# Patient Record
Sex: FEMALE | Race: WHITE | NOT HISPANIC OR LATINO
[De-identification: names, ages, dates, MRNs, and addresses within clinical notes are randomized per-mention and may not be internally consistent; named-entity substitution may affect disease eponyms.]

---

## 2017-09-13 ENCOUNTER — RESULT REVIEW (OUTPATIENT)
Age: 62
End: 2017-09-13

## 2019-01-03 ENCOUNTER — RESULT REVIEW (OUTPATIENT)
Age: 64
End: 2019-01-03

## 2019-02-07 ENCOUNTER — INPATIENT (INPATIENT)
Facility: HOSPITAL | Age: 64
LOS: 3 days | Discharge: HOME | End: 2019-02-11
Attending: HOSPITALIST | Admitting: HOSPITALIST
Payer: COMMERCIAL

## 2019-02-07 VITALS
DIASTOLIC BLOOD PRESSURE: 89 MMHG | OXYGEN SATURATION: 100 % | TEMPERATURE: 98 F | SYSTOLIC BLOOD PRESSURE: 156 MMHG | RESPIRATION RATE: 16 BRPM | HEART RATE: 81 BPM

## 2019-02-07 DIAGNOSIS — Z29.9 ENCOUNTER FOR PROPHYLACTIC MEASURES, UNSPECIFIED: ICD-10-CM

## 2019-02-07 DIAGNOSIS — Z98.89 OTHER SPECIFIED POSTPROCEDURAL STATES: Chronic | ICD-10-CM

## 2019-02-07 DIAGNOSIS — I10 ESSENTIAL (PRIMARY) HYPERTENSION: ICD-10-CM

## 2019-02-07 DIAGNOSIS — D69.6 THROMBOCYTOPENIA, UNSPECIFIED: ICD-10-CM

## 2019-02-07 DIAGNOSIS — E03.9 HYPOTHYROIDISM, UNSPECIFIED: ICD-10-CM

## 2019-02-07 DIAGNOSIS — E11.9 TYPE 2 DIABETES MELLITUS WITHOUT COMPLICATIONS: ICD-10-CM

## 2019-02-07 DIAGNOSIS — E78.5 HYPERLIPIDEMIA, UNSPECIFIED: ICD-10-CM

## 2019-02-07 LAB
ALBUMIN SERPL ELPH-MCNC: 4 G/DL — SIGNIFICANT CHANGE UP (ref 3.3–5)
ALP SERPL-CCNC: 46 U/L — SIGNIFICANT CHANGE UP (ref 40–120)
ALT FLD-CCNC: 14 U/L — SIGNIFICANT CHANGE UP (ref 4–33)
ANION GAP SERPL CALC-SCNC: 12 MMO/L — SIGNIFICANT CHANGE UP (ref 7–14)
APTT BLD: 28.9 SEC — SIGNIFICANT CHANGE UP (ref 27.5–36.3)
AST SERPL-CCNC: 27 U/L — SIGNIFICANT CHANGE UP (ref 4–32)
BASOPHILS # BLD AUTO: 0.06 K/UL — SIGNIFICANT CHANGE UP (ref 0–0.2)
BASOPHILS NFR BLD AUTO: 0.8 % — SIGNIFICANT CHANGE UP (ref 0–2)
BASOPHILS NFR SPEC: 1 % — SIGNIFICANT CHANGE UP (ref 0–2)
BILIRUB SERPL-MCNC: 0.3 MG/DL — SIGNIFICANT CHANGE UP (ref 0.2–1.2)
BLD GP AB SCN SERPL QL: NEGATIVE — SIGNIFICANT CHANGE UP
BUN SERPL-MCNC: 23 MG/DL — SIGNIFICANT CHANGE UP (ref 7–23)
CALCIUM SERPL-MCNC: 9 MG/DL — SIGNIFICANT CHANGE UP (ref 8.4–10.5)
CHLORIDE SERPL-SCNC: 107 MMOL/L — SIGNIFICANT CHANGE UP (ref 98–107)
CO2 SERPL-SCNC: 21 MMOL/L — LOW (ref 22–31)
CREAT SERPL-MCNC: 1.26 MG/DL — SIGNIFICANT CHANGE UP (ref 0.5–1.3)
EOSINOPHIL # BLD AUTO: 0.28 K/UL — SIGNIFICANT CHANGE UP (ref 0–0.5)
EOSINOPHIL NFR BLD AUTO: 3.6 % — SIGNIFICANT CHANGE UP (ref 0–6)
EOSINOPHIL NFR FLD: 0 % — SIGNIFICANT CHANGE UP (ref 0–6)
GLUCOSE SERPL-MCNC: 148 MG/DL — HIGH (ref 70–99)
HAPTOGLOB SERPL-MCNC: 115 MG/DL — SIGNIFICANT CHANGE UP (ref 34–200)
HCT VFR BLD CALC: 39.4 % — SIGNIFICANT CHANGE UP (ref 34.5–45)
HGB BLD-MCNC: 12.6 G/DL — SIGNIFICANT CHANGE UP (ref 11.5–15.5)
HIV COMBO RESULT: SIGNIFICANT CHANGE UP
HIV1+2 AB SPEC QL: SIGNIFICANT CHANGE UP
IMM GRANULOCYTES NFR BLD AUTO: 1.7 % — HIGH (ref 0–1.5)
INR BLD: 1.03 — SIGNIFICANT CHANGE UP (ref 0.88–1.17)
LG PLATELETS BLD QL AUTO: SLIGHT — SIGNIFICANT CHANGE UP
LYMPHOCYTES # BLD AUTO: 1.55 K/UL — SIGNIFICANT CHANGE UP (ref 1–3.3)
LYMPHOCYTES # BLD AUTO: 19.9 % — SIGNIFICANT CHANGE UP (ref 13–44)
LYMPHOCYTES NFR SPEC AUTO: 10 % — LOW (ref 13–44)
MACROCYTES BLD QL: SIGNIFICANT CHANGE UP
MANUAL SMEAR VERIFICATION: SIGNIFICANT CHANGE UP
MCHC RBC-ENTMCNC: 29.8 PG — SIGNIFICANT CHANGE UP (ref 27–34)
MCHC RBC-ENTMCNC: 32 % — SIGNIFICANT CHANGE UP (ref 32–36)
MCV RBC AUTO: 93.1 FL — SIGNIFICANT CHANGE UP (ref 80–100)
MONOCYTES # BLD AUTO: 0.47 K/UL — SIGNIFICANT CHANGE UP (ref 0–0.9)
MONOCYTES NFR BLD AUTO: 6 % — SIGNIFICANT CHANGE UP (ref 2–14)
MONOCYTES NFR BLD: 4 % — SIGNIFICANT CHANGE UP (ref 2–9)
NEUTROPHIL AB SER-ACNC: 85 % — HIGH (ref 43–77)
NEUTROPHILS # BLD AUTO: 5.31 K/UL — SIGNIFICANT CHANGE UP (ref 1.8–7.4)
NEUTROPHILS NFR BLD AUTO: 68 % — SIGNIFICANT CHANGE UP (ref 43–77)
NRBC # BLD: 0 /100WBC — SIGNIFICANT CHANGE UP
NRBC # FLD: 0 K/UL — LOW (ref 25–125)
PLATELET # BLD AUTO: 14 K/UL — CRITICAL LOW (ref 150–400)
PLATELET COUNT - ESTIMATE: SIGNIFICANT CHANGE UP
PMV BLD: 13.5 FL — HIGH (ref 7–13)
POTASSIUM SERPL-MCNC: 4.8 MMOL/L — SIGNIFICANT CHANGE UP (ref 3.5–5.3)
POTASSIUM SERPL-SCNC: 4.8 MMOL/L — SIGNIFICANT CHANGE UP (ref 3.5–5.3)
PROT SERPL-MCNC: 6.8 G/DL — SIGNIFICANT CHANGE UP (ref 6–8.3)
PROTHROM AB SERPL-ACNC: 11.4 SEC — SIGNIFICANT CHANGE UP (ref 9.8–13.1)
RBC # BLD: 4.23 M/UL — SIGNIFICANT CHANGE UP (ref 3.8–5.2)
RBC # FLD: 14 % — SIGNIFICANT CHANGE UP (ref 10.3–14.5)
RETICS #: 106 K/UL — SIGNIFICANT CHANGE UP (ref 25–125)
RETICS/RBC NFR: 2.3 % — SIGNIFICANT CHANGE UP (ref 0.5–2.5)
RH IG SCN BLD-IMP: POSITIVE — SIGNIFICANT CHANGE UP
SODIUM SERPL-SCNC: 140 MMOL/L — SIGNIFICANT CHANGE UP (ref 135–145)
URATE SERPL-MCNC: 6.5 MG/DL — SIGNIFICANT CHANGE UP (ref 2.5–7)
WBC # BLD: 7.8 K/UL — SIGNIFICANT CHANGE UP (ref 3.8–10.5)
WBC # FLD AUTO: 7.8 K/UL — SIGNIFICANT CHANGE UP (ref 3.8–10.5)

## 2019-02-07 PROCEDURE — 99223 1ST HOSP IP/OBS HIGH 75: CPT | Mod: GC

## 2019-02-07 RX ORDER — DEXTROSE 50 % IN WATER 50 %
15 SYRINGE (ML) INTRAVENOUS ONCE
Qty: 0 | Refills: 0 | Status: DISCONTINUED | OUTPATIENT
Start: 2019-02-07 | End: 2019-02-11

## 2019-02-07 RX ORDER — LISINOPRIL 2.5 MG/1
10 TABLET ORAL DAILY
Qty: 0 | Refills: 0 | Status: DISCONTINUED | OUTPATIENT
Start: 2019-02-07 | End: 2019-02-11

## 2019-02-07 RX ORDER — ATORVASTATIN CALCIUM 80 MG/1
80 TABLET, FILM COATED ORAL AT BEDTIME
Qty: 0 | Refills: 0 | Status: DISCONTINUED | OUTPATIENT
Start: 2019-02-07 | End: 2019-02-11

## 2019-02-07 RX ORDER — LEVOTHYROXINE SODIUM 125 MCG
5 TABLET ORAL
Qty: 0 | Refills: 0 | COMMUNITY

## 2019-02-07 RX ORDER — INSULIN LISPRO 100/ML
VIAL (ML) SUBCUTANEOUS
Qty: 0 | Refills: 0 | Status: DISCONTINUED | OUTPATIENT
Start: 2019-02-07 | End: 2019-02-11

## 2019-02-07 RX ORDER — DEXTROSE 50 % IN WATER 50 %
12.5 SYRINGE (ML) INTRAVENOUS ONCE
Qty: 0 | Refills: 0 | Status: DISCONTINUED | OUTPATIENT
Start: 2019-02-07 | End: 2019-02-11

## 2019-02-07 RX ORDER — LEVOTHYROXINE SODIUM 125 MCG
500 TABLET ORAL DAILY
Qty: 0 | Refills: 0 | Status: DISCONTINUED | OUTPATIENT
Start: 2019-02-07 | End: 2019-02-11

## 2019-02-07 RX ORDER — LANOLIN ALCOHOL/MO/W.PET/CERES
3 CREAM (GRAM) TOPICAL AT BEDTIME
Qty: 0 | Refills: 0 | Status: DISCONTINUED | OUTPATIENT
Start: 2019-02-07 | End: 2019-02-11

## 2019-02-07 RX ORDER — INSULIN LISPRO 100/ML
VIAL (ML) SUBCUTANEOUS AT BEDTIME
Qty: 0 | Refills: 0 | Status: DISCONTINUED | OUTPATIENT
Start: 2019-02-07 | End: 2019-02-11

## 2019-02-07 RX ORDER — OXYBUTYNIN CHLORIDE 5 MG
10 TABLET ORAL
Qty: 0 | Refills: 0 | Status: DISCONTINUED | OUTPATIENT
Start: 2019-02-07 | End: 2019-02-11

## 2019-02-07 RX ORDER — DEXTROSE 50 % IN WATER 50 %
25 SYRINGE (ML) INTRAVENOUS ONCE
Qty: 0 | Refills: 0 | Status: DISCONTINUED | OUTPATIENT
Start: 2019-02-07 | End: 2019-02-11

## 2019-02-07 RX ORDER — SODIUM CHLORIDE 9 MG/ML
1000 INJECTION, SOLUTION INTRAVENOUS
Qty: 0 | Refills: 0 | Status: DISCONTINUED | OUTPATIENT
Start: 2019-02-07 | End: 2019-02-11

## 2019-02-07 RX ORDER — CARVEDILOL PHOSPHATE 80 MG/1
6.25 CAPSULE, EXTENDED RELEASE ORAL EVERY 12 HOURS
Qty: 0 | Refills: 0 | Status: DISCONTINUED | OUTPATIENT
Start: 2019-02-07 | End: 2019-02-11

## 2019-02-07 RX ORDER — PREGABALIN 225 MG/1
1000 CAPSULE ORAL DAILY
Qty: 0 | Refills: 0 | Status: DISCONTINUED | OUTPATIENT
Start: 2019-02-07 | End: 2019-02-11

## 2019-02-07 RX ORDER — GLUCAGON INJECTION, SOLUTION 0.5 MG/.1ML
1 INJECTION, SOLUTION SUBCUTANEOUS ONCE
Qty: 0 | Refills: 0 | Status: DISCONTINUED | OUTPATIENT
Start: 2019-02-07 | End: 2019-02-11

## 2019-02-07 RX ORDER — FOLIC ACID 0.8 MG
1 TABLET ORAL DAILY
Qty: 0 | Refills: 0 | Status: DISCONTINUED | OUTPATIENT
Start: 2019-02-07 | End: 2019-02-11

## 2019-02-07 RX ADMIN — Medication 1 MILLIGRAM(S): at 18:38

## 2019-02-07 RX ADMIN — Medication 3 MILLIGRAM(S): at 22:33

## 2019-02-07 RX ADMIN — PREGABALIN 1000 MICROGRAM(S): 225 CAPSULE ORAL at 18:38

## 2019-02-07 RX ADMIN — Medication 10 MILLIGRAM(S): at 18:38

## 2019-02-07 RX ADMIN — CARVEDILOL PHOSPHATE 6.25 MILLIGRAM(S): 80 CAPSULE, EXTENDED RELEASE ORAL at 18:38

## 2019-02-07 RX ADMIN — Medication 100 MILLIGRAM(S): at 18:38

## 2019-02-07 RX ADMIN — ATORVASTATIN CALCIUM 80 MILLIGRAM(S): 80 TABLET, FILM COATED ORAL at 22:33

## 2019-02-07 RX ADMIN — LISINOPRIL 10 MILLIGRAM(S): 2.5 TABLET ORAL at 18:38

## 2019-02-07 NOTE — CONSULT NOTE ADULT - SUBJECTIVE AND OBJECTIVE BOX
HPI:  62 yo F with a history of HTN, well controlled DM2, HLD, nephrolithiasis, hypothyroid, and recurrent breast lumps sent in from her oncologist's (Dr. Soria) office for thrombocytopenia. Per pt, she was getting routine bloodwork prior to starting a new form of Tamoxifen for chemoprevention with Dr. Soria told her to go to ER because her platelets were low. Pt endorses that she bruises easily but that this sx is not new and has been going on for years. She reports normally getting bloodwork about every 3 months and has never been told in the past that she had low platelets. She was recently started on Dydureon a couple of months ago for her DM2 but has not had any other changes to her medications. She has no family history of blood issues. Denies fever, chills, CP, palpitations, abd pain, N/V/D/C, LE swelling.    PAST MEDICAL & SURGICAL HISTORY:  Breast lump: right breast  Gout  GERD (gastroesophageal reflux disease)  Morbid Obesity (ICD9 278.01)  Gall Stones (ICD9 574.20)  H/O: Hypothyroidism (ICD9 V12.2)  Diabetes Mellitus Type 2 in Obese (ICD9 250.00)  Cholesterol Serum Elevated (ICD9 272.9)  H/O: Hypertension (ICD9 V12.59)  H/O cystoscopy: ureteroscopy with lithotripsy 3-4yrs ago  History of cholecystectomy  History of Breast Biopsy (ICD9 V45.89): x3 left breast benign  Heel Spur (ICD9 726.73): bilateral heel spurs  repaired  S/P Carpal Tunnel Release (ICD9 V45.89): left arm  Abscess of Appendix (ICD9 540.1): S/P appendectomy  C Section (ICD9 669.70)  H/O: Hysterectomy (ICD9 V88.01)      Allergies    Cipro (Hives)  coconuts (Hives)  fluoroquinolone antibiotics (Unknown)  raspberries &amp; blueberries (Hives)  Vitamin D (Hives)    Intolerances        MEDICATIONS  (STANDING):  atorvastatin 80 milliGRAM(s) Oral at bedtime  carvedilol 6.25 milliGRAM(s) Oral every 12 hours  cyanocobalamin 1000 MICROGram(s) Oral daily  dextrose 5%. 1000 milliLiter(s) (50 mL/Hr) IV Continuous <Continuous>  dextrose 50% Injectable 12.5 Gram(s) IV Push once  dextrose 50% Injectable 25 Gram(s) IV Push once  dextrose 50% Injectable 25 Gram(s) IV Push once  folic acid 1 milliGRAM(s) Oral daily  insulin lispro (HumaLOG) corrective regimen sliding scale   SubCutaneous three times a day before meals  insulin lispro (HumaLOG) corrective regimen sliding scale   SubCutaneous at bedtime  levothyroxine 500 MICROGram(s) Oral daily  lisinopril 10 milliGRAM(s) Oral daily  oxybutynin 10 milliGRAM(s) Oral two times a day    MEDICATIONS  (PRN):  dextrose 40% Gel 15 Gram(s) Oral once PRN Blood Glucose LESS THAN 70 milliGRAM(s)/deciliter  glucagon  Injectable 1 milliGRAM(s) IntraMuscular once PRN Glucose LESS THAN 70 milligrams/deciliter      FAMILY HISTORY:  Family history of breast cancer (Mother)      SOCIAL HISTORY: No EtOH, no tobacco    REVIEW OF SYSTEMS:    CONSTITUTIONAL: No weakness, fevers or chills  EYES/ENT: No visual changes;  No vertigo or throat pain   NECK: No pain or stiffness  RESPIRATORY: No cough, wheezing, hemoptysis; No shortness of breath  CARDIOVASCULAR: No chest pain or palpitations  GASTROINTESTINAL: No abdominal or epigastric pain. No nausea, vomiting, or hematemesis; No diarrhea or constipation. No melena or hematochezia.  GENITOURINARY: No dysuria, frequency or hematuria  NEUROLOGICAL: No numbness or weakness  SKIN: No itching, burning, rashes, or lesions   All other review of systems is negative unless indicated above.        T(F): 97.7 (02-07-19 @ 09:58), Max: 97.7 (02-07-19 @ 09:58)  HR: 67 (02-07-19 @ 11:23)  BP: 122/64 (02-07-19 @ 11:23)  RR: 25 (02-07-19 @ 11:23)  SpO2: 100% (02-07-19 @ 11:23)  Wt(kg): --    GENERAL: NAD, well-developed  HEAD:  Atraumatic, Normocephalic  EYES: EOMI, PERRLA, conjunctiva and sclera clear  NECK: Supple, No JVD  CHEST/LUNG: Clear to auscultation bilaterally; No wheeze  HEART: Regular rate and rhythm; No murmurs, rubs, or gallops  ABDOMEN: Soft, Nontender, Nondistended; Bowel sounds present  EXTREMITIES:  2+ Peripheral Pulses, No clubbing, cyanosis, or edema  NEUROLOGY: non-focal  SKIN: No rashes or lesions                          12.6   7.80  )-----------( 14       ( 07 Feb 2019 11:17 )             39.4       02-07    140  |  107  |  23  ----------------------------<  148<H>  4.8   |  21<L>  |  1.26    Ca    9.0      07 Feb 2019 11:17    TPro  6.8  /  Alb  4.0  /  TBili  0.3  /  DBili  x   /  AST  27  /  ALT  14  /  AlkPhos  46  02-07      Uric Acid, Serum: 6.5 mg/dL (02-07 @ 14:10)      PT/INR - ( 07 Feb 2019 11:17 )   PT: 11.4 SEC;   INR: 1.03          PTT - ( 07 Feb 2019 11:17 )  PTT:28.9 SEC

## 2019-02-07 NOTE — H&P ADULT - PROBLEM SELECTOR PLAN 1
Pt with isolated thrombocytopenia, seemingly acutely developing given her routine bloodwork outpatient. Concern for ITP given acute presentation, isolated finding and lack of other symptoms. Pt less likely to have drug-induced thrombocytopenia given list of medications are not known for lowering platelets and lack of changes to her medications recently. Also less likely infection-related given lack of other abnormalities seen on CBC and chemistry and lack of other symptoms.  - Hematology consulted, recs appreciated  - Send hep panel, AUBRIE, TSH to r/o as causes of thrombocytopenia. HIV already negative.  - Send B12, folate given h/o deficiency though very low on ddx given rest of CBC is normal  - Per hematology recs, will start treating ITP empirically with prednisone 1mg/kg daily  - Trend platelets daily Pt with isolated thrombocytopenia, seemingly acutely developing given her routine bloodwork outpatient. Concern for ITP given acute presentation, isolated finding and lack of other symptoms. Pt less likely to have drug-induced thrombocytopenia given list of medications are not known for lowering platelets and lack of changes to her medications recently. Also less likely infection-related given lack of other abnormalities seen on CBC and chemistry and lack of other symptoms. TTP and DIC also ruled out given lack of symptoms and normal labwork including coags, hemolysis labs.  - Hematology consulted, recs appreciated  - Send hep panel, AUBRIE, TSH to r/o as causes of thrombocytopenia. HIV already negative.  - Send B12, folate given h/o deficiency though very low on ddx given rest of CBC is normal  - Per hematology recs, will start treating ITP empirically with prednisone 1mg/kg daily  - Trend platelets daily

## 2019-02-07 NOTE — CONSULT NOTE ADULT - ATTENDING COMMENTS
multiple medical comorbidities admitted with plts of 14k, asymptomatic. Likely ITP, r/o secondary causes as above. Start prednisone 1mg/kg. Monitor CBC daily

## 2019-02-07 NOTE — H&P ADULT - NSHPREVIEWOFSYSTEMS_GEN_ALL_CORE
General: Denies dizziness, fatigue  Eyes: Denies blurry vision  ENMT: Denies rhinorrhea  Respiratory: Denies cough, SOB  Cardiovascular: Denies palpitations, CP  Gastrointestinal: Denies abd pain, N/V/D/C, hematochezia, melena  : Denies dysuria, increased freq  Musculoskeletal: Denies edema, joint pain  Endocrine: Denies increased thirst, increased frequency  Allergic/Immunologic: Denies rashes or hives  Neuro: Denies weakness, numbness

## 2019-02-07 NOTE — ED ADULT NURSE NOTE - OBJECTIVE STATEMENT
received pt to rm 12, Sent from Rheumatologist for low platelets. No complaints at this time. h/o 5 benign breast biopsies and scheduled for bone density scan, pt A&Ox4, has no complaints at this time, VSS, IV access rt ac 20g, labs sent, will monitor.

## 2019-02-07 NOTE — H&P ADULT - ASSESSMENT
64 yo F with PMH HTN, DM2 (A1c 6.3, on oral meds), HLD, nephrolithiasis, hypothyroid, recurrent breast lumps sent in from hematologist's office for thrombocytopenia concerning for ITP.

## 2019-02-07 NOTE — ED PROVIDER NOTE - PROGRESS NOTE DETAILS
SAVITA Montaño: Called patient's outpatient laurie Soria, was referred to her cell phone . Dr Soria does not come to [facility], would like her to be seen by house heme. Laurie consulted, Dr. Soria would lean towards admit possibly for steroids and further monitoring. Spoke to laurie who is consulting.

## 2019-02-07 NOTE — H&P ADULT - PROBLEM SELECTOR PLAN 2
Pt well controlled on oral meds with last A1c per patient to be 6.3.  - Hold oral DM2 meds  - FS premeal and bedtime  - ISS premeal and bedtime. Will need to monitor FS closely given initiation of steroids which can cause hyperglycemia  - A1c in AM

## 2019-02-07 NOTE — ED PROVIDER NOTE - CLINICAL SUMMARY MEDICAL DECISION MAKING FREE TEXT BOX
Asymptomatic thrombocytopenia. Non bleeding. Neg neuro findings. PLAN cbc, cmp, coags. If platelet < 15 ct head eval for ich.

## 2019-02-07 NOTE — H&P ADULT - NSHPLABSRESULTS_GEN_ALL_CORE
CBC Full  -  ( 07 Feb 2019 11:17 )  WBC Count : 7.80 K/uL  Hemoglobin : 12.6 g/dL  Hematocrit : 39.4 %  Platelet Count - Automated : 14 K/uL  Mean Cell Volume : 93.1 fL  Mean Cell Hemoglobin : 29.8 pg  Mean Cell Hemoglobin Concentration : 32.0 %  Auto Neutrophil # : 5.31 K/uL  Auto Lymphocyte # : 1.55 K/uL  Auto Monocyte # : 0.47 K/uL  Auto Eosinophil # : 0.28 K/uL  Auto Basophil # : 0.06 K/uL  Auto Neutrophil % : 68.0 %  Auto Lymphocyte % : 19.9 %  Auto Monocyte % : 6.0 %  Auto Eosinophil % : 3.6 %  Auto Basophil % : 0.8 %    02-07    140  |  107  |  23  ----------------------------<  148<H>  4.8   |  21<L>  |  1.26    Ca    9.0      07 Feb 2019 11:17    TPro  6.8  /  Alb  4.0  /  TBili  0.3  /  DBili  x   /  AST  27  /  ALT  14  /  AlkPhos  46  02-07    PT/INR - ( 07 Feb 2019 11:17 )   PT: 11.4 SEC;   INR: 1.03     PTT - ( 07 Feb 2019 11:17 )  PTT:28.9 SEC    HIV Combo Result: NonReact: If patient is suspected to be at risk and/or in the  diagnostic window period, then consider subsequent HIV  retesting with new sample. (02.07.19 @ 14:10)    Reticulocyte Count (02.07.19 @ 14:10)    Reticulocyte Percent: 2.3 %    Absolute Reticulocytes: 106 k/uL    Uric Acid, Serum: 6.5 mg/dL (02.07.19 @ 14:10)    Haptoglobin, Serum: 115 mg/dL (02.07.19 @ 14:10)

## 2019-02-07 NOTE — H&P ADULT - NSHPPHYSICALEXAM_GEN_ALL_CORE
Vital Signs Last 24 Hrs  T(C): 36.5 (07 Feb 2019 09:58), Max: 36.5 (07 Feb 2019 09:58)  T(F): 97.7 (07 Feb 2019 09:58), Max: 97.7 (07 Feb 2019 09:58)  HR: 67 (07 Feb 2019 11:23) (67 - 81)  BP: 122/64 (07 Feb 2019 11:23) (122/64 - 156/89)  BP(mean): --  RR: 25 (07 Feb 2019 11:23) (16 - 25)  SpO2: 100% (07 Feb 2019 11:23) (100% - 100%)    Constitutional: Middle aged, obese woman seen sitting in bed in NAD   Eyes: PERRL, sclera clear  ENMT: MMM, clear oropharynx  Neck: Supple, no cervical LAD  Respiratory: CTAB, no rales, rhonchi or wheezes  Cardiovascular: RRR, no m/g/r  Gastrointestinal: +BS, soft, NT/ND  Neurological: AAOx3  Psychiatric: Appropriate affect and mood  Extremities: No LE edema  Skin: Few echymotic patches, one on the neck, and two on her arms b/l

## 2019-02-07 NOTE — H&P ADULT - ATTENDING COMMENTS
Patient seen and examined. Agree with above note by resident.    #Asymptomatic thrombocytopenia - at this time patient without any complaints. Mild ecchymosis at the site of blood draws but denies any signs of active bleeding. No new medications. No fevers. No schistocytes on diff, coags negative, normal renal function, no recent heparin exposure. At this time suspicion for ITP is highest. Heme evaluation appreciated. Start prednisone for presumed ITP. Monitor cbc.     Plan discussed with patient and HS

## 2019-02-07 NOTE — ED PROVIDER NOTE - OBJECTIVE STATEMENT
10:31 Ashley att; 10:31 Ashley att: 63F h/o htn hld, dm, breast lump 5 benign biopsies sent in from Rheumatologist for thrombocytopenia. Plan for tamoxifen, during routine blood work found to have thrombocytopenia. Denies active bleeding, mental status changes, fevers. Does endorse some easy bruising. 10:31 Ashley att: 63F h/o htn hld, dm, breast lump 5 benign biopsies sent in from Rheumatologist for thrombocytopenia. Plan for tamoxifen, during routine blood work found to have thrombocytopenia. Denies active bleeding, mental status changes, fevers. Does endorse some easy bruising.    SAVITA espinosa: Agree with above, 62 Y/O F with no active symptoms no melena, hematochezia recent fever or weakness had incidentally low platelets on outpatient bloodwork and referred to ED for eval. Pt does not recall level, she does have bruising from her blood draw and from being scratched by her dog. She takes vitamin B 12 and Folate as levels are low. PSH Appendectomy C section Lithotripsy C section heel spurs cateracts ulnar nerve surgery.

## 2019-02-07 NOTE — ED ADULT TRIAGE NOTE - CHIEF COMPLAINT QUOTE
Sent from Rheumatologist for low platelets. No complaints at this time. h/o 5 benign breast biopsies and scheduled for bone density scan

## 2019-02-07 NOTE — H&P ADULT - HISTORY OF PRESENT ILLNESS
62 yo F with PMH HTN, DM2 (A1c 6.3, on oral meds), HLD, nephrolithiasis, recurrent breast lumps sent in from hematologist's office for thrombocytopenia. Per pt, she was getting routine bloodwork prior to starting an antineoplastic medication for her recurrent breast lumps when her oncologist Dr. Soria called her at home to tell her to come in because her platelets were low (she cannot recall the exact number). Pt endorses that she bruises easily but that this sx is not new and has been going on for years. She denies any easy bleeding and visits her outpatient doctors very regularly for follow-up, getting bloodwork about every 3 months. She has never been told she has low platelets before and she has had multiple operations requiring pre-op testing which have not shown any thrombocytopenia in the past, as far as she is aware. She was recently started on Dydureon a couple of months ago for her DM2 but has not had any other changes to her medications. She has no family history of blood issues. Denies fever, chills, CP, palpitations, abd pain, N/V/D/C, LE swelling. 62 yo F with PMH HTN, DM2 (A1c 6.3, on oral meds), HLD, nephrolithiasis, hypothyroid, recurrent breast lumps sent in from hematologist's office for thrombocytopenia. Per pt, she was getting routine bloodwork prior to starting an antineoplastic medication for her recurrent breast lumps when her oncologist Dr. Soria called her at home to tell her to come in because her platelets were low (she cannot recall the exact number). Pt endorses that she bruises easily but that this sx is not new and has been going on for years. She denies any easy bleeding and visits her outpatient doctors very regularly for follow-up, getting bloodwork about every 3 months. She has never been told she has low platelets before and she has had multiple operations requiring pre-op testing which have not shown any thrombocytopenia in the past, as far as she is aware. She was recently started on Dydureon a couple of months ago for her DM2 but has not had any other changes to her medications. She has no family history of blood issues. Denies fever, chills, CP, palpitations, abd pain, N/V/D/C, LE swelling.

## 2019-02-07 NOTE — ED PROVIDER NOTE - MEDICAL DECISION MAKING DETAILS
Pt presented to ED for low platelets. Pt does have easy bruising, no other acute symptoms. Labs ordered, pt stable at this time denies melena or hematochezia.

## 2019-02-07 NOTE — ED PROVIDER NOTE - ATTENDING CONTRIBUTION TO CARE
Dr. Ramirez: I performed a face to face bedside interview with patient regarding history of present illness, review of symptoms and past medical history. I completed an independent physical exam.  I have discussed patient's plan of care with PA.   I agree with note as stated above, having amended the EMR as needed to reflect my findings.   This includes HISTORY OF PRESENT ILLNESS, HIV, PAST MEDICAL/SURGICAL/FAMILY/SOCIAL HISTORY, ALLERGIES AND HOME MEDICATIONS, REVIEW OF SYSTEMS, PHYSICAL EXAM, and any PROGRESS NOTES during the time I functioned as the attending physician for this patient. HPI above as by me. PE above as by me. DDX Asymptomatic thrombocytopenia. Non bleeding. Neg neuro findings. PLAN cbc, cmp, coags. If platelet < 15 ct head eval for ich.

## 2019-02-07 NOTE — CONSULT NOTE ADULT - ASSESSMENT
64 y/o F with multiple medical comorbidities including well controlled T2DM who is here for new finding of severe thrombocytopenia.     #Thrombocytopenia  Will review peripheral smear.   MPV elevated and large platelets seen, consistent with ITP.   Would rule out secondary causes, check hepatitis panel and HIV. Also check AUBRIE.   Would start empiric treatment tonight with prednisone 1 mg/kg daily.   Will require close management of fingersticks in the setting of steroids, well controlled recently.   Monitor CBC daily.  May ultimately require bone marrow evaluation as outpatient.    Will follow closely. 62 y/o F with multiple medical comorbidities including well controlled T2DM who is here for new finding of severe thrombocytopenia.     #Thrombocytopenia  Will review peripheral smear.   MPV elevated and large platelets seen, consistent with ITP.   Would rule out secondary causes, check hepatitis panel and HIV. Also check AUBRIE.   Would start empiric treatment tonight with prednisone 1 mg/kg daily.   Will require close management of fingersticks in the setting of steroids, well controlled recently.   Monitor CBC daily.  May ultimately require bone marrow evaluation as outpatient.    Will follow closely.     Bradley Goldberg M.D. - PGY-6  Hematology/Oncology Fellow  Pager: [phone] ([facility])   [phone] ([facility])  Weekends and after 5PM please contact on call fellow.

## 2019-02-07 NOTE — ED PROVIDER NOTE - PSH
Abscess of Appendix (ICD9 540.1)  S/P appendectomy  C Section (ICD9 669.70)    H/O cystoscopy  ureteroscopy with lithotripsy 3-4yrs ago  H/O: Hysterectomy (ICD9 V88.01)    Heel Spur (ICD9 726.73)  bilateral heel spurs  repaired  History of Breast Biopsy (ICD9 V45.89)  x3 left breast benign  History of cholecystectomy    S/P Carpal Tunnel Release (ICD9 V45.89)  left arm

## 2019-02-07 NOTE — ED PROVIDER NOTE - PHYSICAL EXAMINATION
Ashley att: nad, aaox3, cn2-12 intact, neg intra oral bleeding, ctabl, rrr, s1s2, abd soft ntnd, neg le edema

## 2019-02-07 NOTE — ED PROVIDER NOTE - PMH
Breast lump  right breast  Cholesterol Serum Elevated (ICD9 272.9)    Diabetes Mellitus Type 2 in Obese (ICD9 250.00)    Gall Stones (ICD9 574.20)    GERD (gastroesophageal reflux disease)    Gout    H/O: Hypertension (ICD9 V12.59)    H/O: Hypothyroidism (ICD9 V12.2)    Morbid Obesity (ICD9 278.01)

## 2019-02-08 ENCOUNTER — TRANSCRIPTION ENCOUNTER (OUTPATIENT)
Age: 64
End: 2019-02-08

## 2019-02-08 DIAGNOSIS — D69.3 IMMUNE THROMBOCYTOPENIC PURPURA: ICD-10-CM

## 2019-02-08 LAB
ANION GAP SERPL CALC-SCNC: 13 MMO/L — SIGNIFICANT CHANGE UP (ref 7–14)
ANISOCYTOSIS BLD QL: SLIGHT — SIGNIFICANT CHANGE UP
BASOPHILS # BLD AUTO: 0.02 K/UL — SIGNIFICANT CHANGE UP (ref 0–0.2)
BASOPHILS NFR BLD AUTO: 0.3 % — SIGNIFICANT CHANGE UP (ref 0–2)
BASOPHILS NFR SPEC: 0 % — SIGNIFICANT CHANGE UP (ref 0–2)
BLASTS # FLD: 0 % — SIGNIFICANT CHANGE UP (ref 0–0)
BUN SERPL-MCNC: 24 MG/DL — HIGH (ref 7–23)
CALCIUM SERPL-MCNC: 8.8 MG/DL — SIGNIFICANT CHANGE UP (ref 8.4–10.5)
CHLORIDE SERPL-SCNC: 105 MMOL/L — SIGNIFICANT CHANGE UP (ref 98–107)
CO2 SERPL-SCNC: 21 MMOL/L — LOW (ref 22–31)
CREAT SERPL-MCNC: 1.24 MG/DL — SIGNIFICANT CHANGE UP (ref 0.5–1.3)
EOSINOPHIL # BLD AUTO: 0.01 K/UL — SIGNIFICANT CHANGE UP (ref 0–0.5)
EOSINOPHIL NFR BLD AUTO: 0.1 % — SIGNIFICANT CHANGE UP (ref 0–6)
EOSINOPHIL NFR FLD: 0 % — SIGNIFICANT CHANGE UP (ref 0–6)
FOLATE SERPL-MCNC: 9.6 NG/ML — SIGNIFICANT CHANGE UP (ref 4.7–20)
GIANT PLATELETS BLD QL SMEAR: PRESENT — SIGNIFICANT CHANGE UP
GLUCOSE SERPL-MCNC: 205 MG/DL — HIGH (ref 70–99)
HAV IGM SER-ACNC: NONREACTIVE — SIGNIFICANT CHANGE UP
HBA1C BLD-MCNC: 6 % — HIGH (ref 4–5.6)
HBV CORE IGM SER-ACNC: NONREACTIVE — SIGNIFICANT CHANGE UP
HBV CORE IGM SER-ACNC: NONREACTIVE — SIGNIFICANT CHANGE UP
HBV SURFACE AG SER-ACNC: NEGATIVE — SIGNIFICANT CHANGE UP
HBV SURFACE AG SER-ACNC: NONREACTIVE — SIGNIFICANT CHANGE UP
HCT VFR BLD CALC: 36.2 % — SIGNIFICANT CHANGE UP (ref 34.5–45)
HCT VFR BLD CALC: 37.6 % — SIGNIFICANT CHANGE UP (ref 34.5–45)
HCV AB S/CO SERPL IA: 0.11 S/CO — SIGNIFICANT CHANGE UP
HCV AB S/CO SERPL IA: 0.13 S/CO — SIGNIFICANT CHANGE UP
HCV AB SERPL-IMP: SIGNIFICANT CHANGE UP
HCV AB SERPL-IMP: SIGNIFICANT CHANGE UP
HGB BLD-MCNC: 11.6 G/DL — SIGNIFICANT CHANGE UP (ref 11.5–15.5)
HGB BLD-MCNC: 11.9 G/DL — SIGNIFICANT CHANGE UP (ref 11.5–15.5)
IMM GRANULOCYTES NFR BLD AUTO: 1.7 % — HIGH (ref 0–1.5)
LYMPHOCYTES # BLD AUTO: 0.83 K/UL — LOW (ref 1–3.3)
LYMPHOCYTES # BLD AUTO: 12 % — LOW (ref 13–44)
LYMPHOCYTES NFR SPEC AUTO: 12.3 % — LOW (ref 13–44)
MACROCYTES BLD QL: SLIGHT — SIGNIFICANT CHANGE UP
MAGNESIUM SERPL-MCNC: 1.9 MG/DL — SIGNIFICANT CHANGE UP (ref 1.6–2.6)
MCHC RBC-ENTMCNC: 29.4 PG — SIGNIFICANT CHANGE UP (ref 27–34)
MCHC RBC-ENTMCNC: 29.5 PG — SIGNIFICANT CHANGE UP (ref 27–34)
MCHC RBC-ENTMCNC: 31.6 % — LOW (ref 32–36)
MCHC RBC-ENTMCNC: 32 % — SIGNIFICANT CHANGE UP (ref 32–36)
MCV RBC AUTO: 91.9 FL — SIGNIFICANT CHANGE UP (ref 80–100)
MCV RBC AUTO: 93.3 FL — SIGNIFICANT CHANGE UP (ref 80–100)
METAMYELOCYTES # FLD: 0 % — SIGNIFICANT CHANGE UP (ref 0–1)
MONOCYTES # BLD AUTO: 0.14 K/UL — SIGNIFICANT CHANGE UP (ref 0–0.9)
MONOCYTES NFR BLD AUTO: 2 % — SIGNIFICANT CHANGE UP (ref 2–14)
MONOCYTES NFR BLD: 0 % — LOW (ref 2–9)
MYELOCYTES NFR BLD: 0 % — SIGNIFICANT CHANGE UP (ref 0–0)
NEUTROPHIL AB SER-ACNC: 84.2 % — HIGH (ref 43–77)
NEUTROPHILS # BLD AUTO: 5.82 K/UL — SIGNIFICANT CHANGE UP (ref 1.8–7.4)
NEUTROPHILS NFR BLD AUTO: 83.9 % — HIGH (ref 43–77)
NEUTS BAND # BLD: 1.7 % — SIGNIFICANT CHANGE UP (ref 0–6)
NRBC # FLD: 0 K/UL — LOW (ref 25–125)
NRBC # FLD: 0 K/UL — LOW (ref 25–125)
OTHER - HEMATOLOGY %: 0 — SIGNIFICANT CHANGE UP
PHOSPHATE SERPL-MCNC: 2.9 MG/DL — SIGNIFICANT CHANGE UP (ref 2.5–4.5)
PLATELET # BLD AUTO: 18 K/UL — CRITICAL LOW (ref 150–400)
PLATELET # BLD AUTO: 30 K/UL — LOW (ref 150–400)
PLATELET COUNT - ESTIMATE: SIGNIFICANT CHANGE UP
PMV BLD: 12.5 FL — SIGNIFICANT CHANGE UP (ref 7–13)
PMV BLD: SIGNIFICANT CHANGE UP FL (ref 7–13)
POTASSIUM SERPL-MCNC: 4.2 MMOL/L — SIGNIFICANT CHANGE UP (ref 3.5–5.3)
POTASSIUM SERPL-SCNC: 4.2 MMOL/L — SIGNIFICANT CHANGE UP (ref 3.5–5.3)
PROMYELOCYTES # FLD: 0 % — SIGNIFICANT CHANGE UP (ref 0–0)
RBC # BLD: 3.94 M/UL — SIGNIFICANT CHANGE UP (ref 3.8–5.2)
RBC # BLD: 4.03 M/UL — SIGNIFICANT CHANGE UP (ref 3.8–5.2)
RBC # FLD: 13.9 % — SIGNIFICANT CHANGE UP (ref 10.3–14.5)
RBC # FLD: 13.9 % — SIGNIFICANT CHANGE UP (ref 10.3–14.5)
SODIUM SERPL-SCNC: 139 MMOL/L — SIGNIFICANT CHANGE UP (ref 135–145)
TSH SERPL-MCNC: 17.88 UIU/ML — HIGH (ref 0.27–4.2)
VARIANT LYMPHS # BLD: 1.8 % — SIGNIFICANT CHANGE UP
VIT B12 SERPL-MCNC: 764 PG/ML — SIGNIFICANT CHANGE UP (ref 200–900)
WBC # BLD: 11.09 K/UL — HIGH (ref 3.8–10.5)
WBC # BLD: 6.94 K/UL — SIGNIFICANT CHANGE UP (ref 3.8–10.5)
WBC # FLD AUTO: 11.09 K/UL — HIGH (ref 3.8–10.5)
WBC # FLD AUTO: 6.94 K/UL — SIGNIFICANT CHANGE UP (ref 3.8–10.5)

## 2019-02-08 PROCEDURE — 99233 SBSQ HOSP IP/OBS HIGH 50: CPT | Mod: GC

## 2019-02-08 RX ADMIN — Medication 1: at 18:16

## 2019-02-08 RX ADMIN — Medication 10 MILLIGRAM(S): at 05:21

## 2019-02-08 RX ADMIN — Medication 3 MILLIGRAM(S): at 22:22

## 2019-02-08 RX ADMIN — Medication 1: at 09:26

## 2019-02-08 RX ADMIN — Medication 10 MILLIGRAM(S): at 18:51

## 2019-02-08 RX ADMIN — Medication 1 MILLIGRAM(S): at 12:15

## 2019-02-08 RX ADMIN — Medication 1: at 13:19

## 2019-02-08 RX ADMIN — Medication 100 MILLIGRAM(S): at 18:46

## 2019-02-08 RX ADMIN — ATORVASTATIN CALCIUM 80 MILLIGRAM(S): 80 TABLET, FILM COATED ORAL at 22:23

## 2019-02-08 RX ADMIN — Medication 500 MICROGRAM(S): at 05:23

## 2019-02-08 RX ADMIN — PREGABALIN 1000 MICROGRAM(S): 225 CAPSULE ORAL at 12:19

## 2019-02-08 NOTE — DISCHARGE NOTE ADULT - CARE PROVIDER_API CALL
Yoel Soria)  Hematology; Internal Medicine; Medical Oncology  [address] [place] [address]  Phone: [phone]  Fax: [phone]  Follow Up Time:

## 2019-02-08 NOTE — DISCHARGE NOTE ADULT - MEDICATION SUMMARY - MEDICATIONS TO CHANGE
I will SWITCH the dose or number of times a day I take the medications listed below when I get home from the hospital:    glipizide-metformin 5 mg-500 mg oral tablet  -- 1 tab(s) by mouth once a day

## 2019-02-08 NOTE — DISCHARGE NOTE ADULT - CARE PLAN
Principal Discharge DX:	Immune thrombocytopenia  Goal:	Please call ***  Assessment and plan of treatment:	Thrombocytopenia means low platelet counts. This can happen for many reasons, but we believe  yours is autoimmune, meaning that your body occasional causes your platelet numbers to go down. This can be improved with steroids (PREDNISONE) and time. Please follow up with our Hematologist to ensure your platelet numbers are recovering well.  Secondary Diagnosis:	Diabetes mellitus  Goal:	Please call your primary care provider and request an appointment WITHIN 1 WEEK.  Assessment and plan of treatment:	Because of your steroids for your thrombocytopenia, your glucose will likely be high until you finish this medication. Please let your primary care provider known as other diabetes medications may have to be added if your glucose becomes too high. Principal Discharge DX:	Immune thrombocytopenia  Goal:	Please call your hematologist and request a follow-up appointment WITHIN 1 WEEK.  Assessment and plan of treatment:	Thrombocytopenia means low platelet counts. This can happen for many reasons, but we believe  yours is autoimmune, meaning that your body occasional causes your platelet numbers to go down. This can be improved with steroids (PREDNISONE) and time. Please continue your PREDNISONE for 3 WEEKS. Please follow up with your Hematologist to ensure your platelet numbers are recovering well.  Secondary Diagnosis:	Diabetes mellitus  Goal:	Please call your primary care provider and request an appointment WITHIN 1 WEEK.  Assessment and plan of treatment:	Because of your steroids for your thrombocytopenia, your glucose will likely be high until you finish this medication. Please let your primary care provider known as other diabetes medications may have to be added if your glucose becomes too high. Principal Discharge DX:	Immune thrombocytopenia  Goal:	Please call your hematologist and request a follow-up appointment WITHIN 1 WEEK.  Assessment and plan of treatment:	Thrombocytopenia means low platelet counts. This can happen for many reasons, but we believe  yours is autoimmune, meaning that your body occasional causes your platelet numbers to go down. This can be improved with steroids (PREDNISONE) and time. Please continue your PREDNISONE for 3 WEEKS. Please follow up with your Hematologist to ensure your platelet numbers are recovering well.  Secondary Diagnosis:	Diabetes mellitus  Goal:	Please call your primary care provider and request an appointment WITHIN 1 WEEK. Please update your ENDOCRINOLOGIST as well and ask for a follow-up appointment WITHIN 2-4 weeks.  Assessment and plan of treatment:	Because of your steroids for your thrombocytopenia, your glucose will likely be high until you finish this medication. Please let your primary care provider known as other diabetes medications may have to be added if your glucose becomes too high. Principal Discharge DX:	Immune thrombocytopenia  Goal:	Please call your HEMATOLOGIST and request a follow-up appointment WITHIN 1 WEEK. Continue to take your PREDNISONE until you see your hematologist.  Assessment and plan of treatment:	Thrombocytopenia means low platelet counts. This can happen for many reasons, but we believe  yours is autoimmune, meaning that your body occasionally causes your platelet numbers to go down. Please continue your PREDNISONE for the next week until you are able to see your hematologist. When making an appointment, please let them know this is a hospital follow up and that it is important that you see a hematologist WITHIN 1 WEEK.  Secondary Diagnosis:	Diabetes mellitus  Goal:	Please call your primary care provider and request an appointment WITHIN 1 WEEK. Please update your ENDOCRINOLOGIST as well and ask for a follow-up appointment WITHIN 2-4 weeks.  Assessment and plan of treatment:	Because of your steroids for your thrombocytopenia, your glucose will likely be high until you finish this medication. Please let your primary care provider known as other diabetes medications may have to be added if your glucose becomes too high. Principal Discharge DX:	Immune thrombocytopenia  Goal:	Please call your HEMATOLOGIST and request a follow-up appointment WITHIN 1 WEEK. Continue to take your PREDNISONE until you see your hematologist.  Assessment and plan of treatment:	Thrombocytopenia means low platelet counts. This can happen for many reasons, but we believe  yours is autoimmune, meaning that your body occasionally causes your platelet numbers to go down. Please continue your PREDNISONE for the next week until you are able to see your hematologist. When making an appointment, please let them know this is a hospital follow up and that it is important that you see a hematologist WITHIN 1 WEEK.  Secondary Diagnosis:	Diabetes mellitus  Goal:	Please call your primary care provider and request an appointment WITHIN 1 WEEK. Please update your ENDOCRINOLOGIST as well and ask for a follow-up appointment WITHIN 2-4 weeks.  Assessment and plan of treatment:	Because of your steroids for your thrombocytopenia, your glucose will likely be high until you finish this medication. Please let your primary care provider known as other diabetes medications may have to be added if your glucose becomes too high. For now, increase  your glipizide-metformin to TWICE A DAY.

## 2019-02-08 NOTE — PROGRESS NOTE ADULT - SUBJECTIVE AND OBJECTIVE BOX
Eliza Block MD PGY1  [id number]    Patient is a 63y old  Female who presents with a chief complaint of Thrombocytopenia (07 Feb 2019 17:29)    SUBJECTIVE/OVERNIGHT EVENTS     Vital Signs Last 24 Hrs  T(C): 36.4 (08 Feb 2019 05:19), Max: 36.9 (07 Feb 2019 21:47)  T(F): 97.5 (08 Feb 2019 05:19), Max: 98.4 (07 Feb 2019 21:47)  HR: 61 (08 Feb 2019 05:19) (61 - 81)  BP: 106/56 (08 Feb 2019 05:19) (106/56 - 156/89)  BP(mean): --  RR: 18 (08 Feb 2019 05:19) (16 - 25)  SpO2: 99% (08 Feb 2019 05:19) (98% - 100%)    PHYSICAL EXAM:  GENERAL: NAD, well-developed  HEAD:  Atraumatic, Normocephalic  EYES: EOMI, PERRLA, conjunctiva and sclera clear  NECK: Supple, No JVD  CHEST/LUNG: Clear to auscultation bilaterally; No wheeze  HEART: Regular rate and rhythm; No murmurs, rubs, or gallops  ABDOMEN: Soft, Nontender, Nondistended; Bowel sounds present  EXTREMITIES:  2+ Peripheral Pulses, No clubbing, cyanosis, or edema  PSYCH: AAOx3  NEUROLOGY: non-focal  SKIN: No rashes or lesions Eliza Block MD PGY1  [phone]    Patient is a 63y old  Female who presents with a chief complaint of Thrombocytopenia (07 Feb 2019 17:29)    SUBJECTIVE/OVERNIGHT EVENTS   No acute events O/N.    This AM doing well. States was going to outpt onc for f/up of breast lump bx and incidentally found to have plts in 10s. Denies any mucosal bleeding, joint swelling, or out of proportion ecchymosis.  has regular CBCs drawn Q3mo per PCP and did not have abnormalities on last draw. Has not be exposed to any antineoplastic drugs > 8yrs (last tamoxifene).     Vital Signs Last 24 Hrs  T(C): 36.4 (08 Feb 2019 05:19), Max: 36.9 (07 Feb 2019 21:47)  T(F): 97.5 (08 Feb 2019 05:19), Max: 98.4 (07 Feb 2019 21:47)  HR: 61 (08 Feb 2019 05:19) (61 - 81)  BP: 106/56 (08 Feb 2019 05:19) (106/56 - 156/89)  BP(mean): --  RR: 18 (08 Feb 2019 05:19) (16 - 25)  SpO2: 99% (08 Feb 2019 05:19) (98% - 100%)    PHYSICAL EXAM:  GENERAL: NAD, well-developed  HEAD:  Atraumatic, Normocephalic             3cm x 4cm ecchomyosis overlying small, hard mildly tender nodule.  EYES: EOMI, PERRLA, conjunctiva and sclera clear  NECK: Supple, No JVD  CHEST/LUNG: Clear to auscultation bilaterally; No wheeze  HEART: Regular rate and rhythm; No murmurs, rubs, or gallops  ABDOMEN: Soft, Nontender, Nondistended; Bowel sounds present  EXTREMITIES:  2+ Peripheral Pulses, No clubbing, cyanosis, or edema. Scatter small echymosis on hands and arms.  PSYCH: AAOx3  NEUROLOGY: non-focal  SKIN: No rashes or lesions        MEDICATIONS  (STANDING):  atorvastatin 80 milliGRAM(s) Oral at bedtime  carvedilol 6.25 milliGRAM(s) Oral every 12 hours  cyanocobalamin 1000 MICROGram(s) Oral daily  dextrose 5%. 1000 milliLiter(s) (50 mL/Hr) IV Continuous <Continuous>  dextrose 50% Injectable 12.5 Gram(s) IV Push once  dextrose 50% Injectable 25 Gram(s) IV Push once  dextrose 50% Injectable 25 Gram(s) IV Push once  folic acid 1 milliGRAM(s) Oral daily  insulin lispro (HumaLOG) corrective regimen sliding scale   SubCutaneous three times a day before meals  insulin lispro (HumaLOG) corrective regimen sliding scale   SubCutaneous at bedtime  levothyroxine 500 MICROGram(s) Oral daily  lisinopril 10 milliGRAM(s) Oral daily  melatonin 3 milliGRAM(s) Oral at bedtime  oxybutynin 10 milliGRAM(s) Oral two times a day  predniSONE   Tablet 100 milliGRAM(s) Oral daily    MEDICATIONS  (PRN):  dextrose 40% Gel 15 Gram(s) Oral once PRN Blood Glucose LESS THAN 70 milliGRAM(s)/deciliter  glucagon  Injectable 1 milliGRAM(s) IntraMuscular once PRN Glucose LESS THAN 70 milligrams/deciliter    CAPILLARY BLOOD GLUCOSE  191 (07 Feb 2019 22:34)  123 (07 Feb 2019 18:11)      POCT Blood Glucose.: 174 mg/dL (08 Feb 2019 08:57)  POCT Blood Glucose.: 191 mg/dL (07 Feb 2019 22:17)  POCT Blood Glucose.: 127 mg/dL (07 Feb 2019 17:56)    I&O's Summary      LABS                        11.6   6.94  )-----------( 18       ( 08 Feb 2019 06:26 )             36.2                         12.6   7.80  )-----------( 14       ( 07 Feb 2019 11:17 )             39.4     02-08    139  |  105  |  24<H>  ----------------------------<  205<H>  4.2   |  21<L>  |  1.24  02-07    140  |  107  |  23  ----------------------------<  148<H>  4.8   |  21<L>  |  1.26    Ca    8.8      08 Feb 2019 06:26  Ca    9.0      07 Feb 2019 11:17  Phos  2.9     02-08  Mg     1.9     02-08    TPro  6.8  /  Alb  4.0  /  TBili  0.3  /  DBili  x   /  AST  27  /  ALT  14  /  AlkPhos  46  02-07    PT/INR - ( 07 Feb 2019 11:17 )   PT: 11.4 SEC;   INR: 1.03          PTT - ( 07 Feb 2019 11:17 )  PTT:28.9 SEC      Thyroid Stimulating Hormone, Serum (02.08.19 @ 06:26)    Thyroid Stimulating Hormone, Serum: 17.88 uIU/mL

## 2019-02-08 NOTE — PROGRESS NOTE ADULT - PROBLEM SELECTOR PLAN 2
-on home dose levothyroxine 500mcg QD  -TSH remains highly elevated in 17s  -will clarify and provide education on properly taking medications  -denies any leg swelling, cold intolerance, excessive fatigue, constipation

## 2019-02-08 NOTE — DISCHARGE NOTE ADULT - PLAN OF CARE
Please call *** Thrombocytopenia means low platelet counts. This can happen for many reasons, but we believe  yours is autoimmune, meaning that your body occasional causes your platelet numbers to go down. This can be improved with steroids (PREDNISONE) and time. Please follow up with our Hematologist to ensure your platelet numbers are recovering well. Please call your primary care provider and request an appointment WITHIN 1 WEEK. Because of your steroids for your thrombocytopenia, your glucose will likely be high until you finish this medication. Please let your primary care provider known as other diabetes medications may have to be added if your glucose becomes too high. Please call your hematologist and request a follow-up appointment WITHIN 1 WEEK. Thrombocytopenia means low platelet counts. This can happen for many reasons, but we believe  yours is autoimmune, meaning that your body occasional causes your platelet numbers to go down. This can be improved with steroids (PREDNISONE) and time. Please continue your PREDNISONE for 3 WEEKS. Please follow up with your Hematologist to ensure your platelet numbers are recovering well. Please call your primary care provider and request an appointment WITHIN 1 WEEK. Please update your ENDOCRINOLOGIST as well and ask for a follow-up appointment WITHIN 2-4 weeks. Please call your HEMATOLOGIST and request a follow-up appointment WITHIN 1 WEEK. Continue to take your PREDNISONE until you see your hematologist. Thrombocytopenia means low platelet counts. This can happen for many reasons, but we believe  yours is autoimmune, meaning that your body occasionally causes your platelet numbers to go down. Please continue your PREDNISONE for the next week until you are able to see your hematologist. When making an appointment, please let them know this is a hospital follow up and that it is important that you see a hematologist WITHIN 1 WEEK. Because of your steroids for your thrombocytopenia, your glucose will likely be high until you finish this medication. Please let your primary care provider known as other diabetes medications may have to be added if your glucose becomes too high. For now, increase  your glipizide-metformin to TWICE A DAY.

## 2019-02-08 NOTE — PROGRESS NOTE ADULT - PROBLEM SELECTOR PLAN 1
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 HIV, HepB sAg negative.  -no evidence of active bleeding  -txf goal plts > 10.

## 2019-02-08 NOTE — DISCHARGE NOTE ADULT - MEDICATION SUMMARY - MEDICATIONS TO TAKE
I will START or STAY ON the medications listed below when I get home from the hospital:    predniSONE 50 mg oral tablet  -- 2 tab(s) by mouth once a day.  -- Indication: For Immune thrombocytopenia    aspirin 81 mg oral tablet  -- 1 tab(s) by mouth once a day  -- Indication: For Prophylactic measure    quinapril 10 mg oral tablet  -- 1 tab(s) by mouth once a day  -- Indication: For HTN    Bydureon Pen 2 mg subcutaneous injection, extended release  -- once a week  -- Indication: For Diabetes mellitus    glipizide-metformin 5 mg-500 mg oral tablet  -- 1 tab(s) by mouth 2 times a day   -- Do not drink alcoholic beverages when taking this medication.  It is very important that you take or use this exactly as directed.  Do not skip doses or discontinue unless directed by your doctor.  Take with food or milk.    -- Indication: For Diabetes mellitus    fenofibrate 134 mg oral capsule  -- 1 cap(s) by mouth once a day  -- Indication: For HLD    rosuvastatin 20 mg oral tablet  -- 1 tab(s) by mouth once a day (at bedtime)  -- Indication: For HLD    Zetia 10 mg oral tablet  -- 1 tab(s) by mouth once a day  -- Indication: For HLD    carvedilol 6.25 mg oral tablet  -- 1 tab(s) by mouth once a day (patient states she takes only once a day. Rx states should be bid)  -- Indication: For HTN    Melatonin 5 mg oral tablet  -- 1 tab(s) by mouth once a day (at bedtime)  -- Indication: For Sleep    Tirosint 100 mcg (0.1 mg) oral capsule  -- 5 cap(s) by mouth once a day (patient states she takes 5 capsules of the 100mcg qd. Rx from 11/18 was for 90 days of 4 capsules qd)  -- Indication: For Hypothyroid    VESIcare 10 mg oral tablet  -- 1 tab(s) by mouth once a day  -- Indication: For Urinary spasm    Vitamin B12  -- Indication: For Prophylactic measure    folic acid 1 mg oral tablet  -- 1 tab(s) by mouth once a day  -- Indication: For Prophylactic measure

## 2019-02-08 NOTE — DISCHARGE NOTE ADULT - CONDITIONS AT DISCHARGE
Pt platelet counts improved, no s/s of acute distress noted, pt denies any pain or discomfort, pt skin intact.

## 2019-02-08 NOTE — DISCHARGE NOTE ADULT - HOSPITAL COURSE
64yo F generally healthy w/ PMHx breast masses (non-malignant), hypothyroidism, well controlled T2DM, and HTN admitted for new thrombocytopenia w/ plts in 10s. Pt w/out any sx active bleeding or other systemic sx. Found to have elevated MCV and large plts, consistent w/ immune TCP. Hematology consulted, advised initation of 1mg/kg prednisone (100mg QD). Pt demonstrated improvement of plts on steroids, and prior to d/c, plts stably at ****. Pt given strict return precautions and told to f/up w/ PCP prior to d/c. 64yo F generally healthy w/ PMHx breast masses (non-malignant), hypothyroidism, well controlled T2DM, and HTN admitted for new thrombocytopenia w/ plts in 10s. Pt w/out any sx active bleeding or other systemic sx. Found to have elevated MCV and large plts, consistent w/ immune TCP. Hematology consulted, advised initation of 1mg/kg prednisone (100mg QD). Pt demonstrated improvement of plts on steroids, and prior to d/c, plts continuing to uptrend and >50. Pt given strict return precautions and told to f/up w/ PCP prior to d/c. Course c/b steroid induced hyperglycemia. Pt w/ T2DM well controlled on orals and bydureon. After discussion, pt agreed to start on prandin and to have close f/up w/ PCP and endocrinologist for further adjustment of medications while on steroids. 64yo F generally healthy w/ PMHx breast masses (non-malignant), hypothyroidism, well controlled T2DM, and HTN admitted for new thrombocytopenia w/ plts in 10s. Pt w/out any sx active bleeding or other systemic sx. Found to have elevated MCV and large plts, consistent w/ immune TCP. Hematology consulted, advised initation of 1mg/kg prednisone (100mg QD). Pt demonstrated improvement of plts on steroids, and prior to d/c, plts continuing to uptrend and >50. Per Hematology, pt medically stable for d/c and to continue on 100mg QD prednisone x 1 week until able to f/up w/ outpt hematologist for initiation of taper. Pt given strict return precautions and told to f/up w/ PCP prior to d/c. Course c/b steroid induced hyperglycemia. Pt w/ T2DM well controlled on orals and bydureon. After discussion, pt agreed to start on prandin and to have close f/up w/ PCP and endocrinologist for further adjustment of medications while on steroids. 62yo F generally healthy w/ PMHx breast masses (non-malignant), hypothyroidism, well controlled T2DM, and HTN admitted for new thrombocytopenia w/ plts in 10s. Pt w/out any sx active bleeding or other systemic sx. Found to have elevated MCV and large plts, consistent w/ immune TCP. Hematology consulted, advised initation of 1mg/kg prednisone (100mg QD). Pt demonstrated improvement of plts on steroids, and prior to d/c, plts continuing to uptrend and >50. Per Hematology, pt medically stable for d/c and to continue on 100mg QD prednisone x 1 week until able to f/up w/ outpt hematologist for initiation of taper. Pt given strict return precautions and told to f/up w/ PCP prior to d/c. Course c/b steroid induced hyperglycemia. Pt w/ T2DM well controlled on orals and bydureon. After discussion, decided to increase home glipizide-metformin to BID (increase from 5/500 to 5/500 BID) until f/up w/ PCP and endocrinologist for further adjustment of medications while on steroids.

## 2019-02-08 NOTE — PROGRESS NOTE ADULT - SUBJECTIVE AND OBJECTIVE BOX
INTERVAL HPI/OVERNIGHT EVENTS:  Patient S&E at bedside. No o/n events, patient asymptomatic feeling well.     VITAL SIGNS:  T(F): 98.3 (02-08-19 @ 13:20)  HR: 64 (02-08-19 @ 13:20)  BP: 131/61 (02-08-19 @ 13:20)  RR: 18 (02-08-19 @ 13:20)  SpO2: 100% (02-08-19 @ 13:20)  Wt(kg): --    PHYSICAL EXAM:    Constitutional: NAD  Eyes: EOMI, sclera non-icteric  Neck: supple, no masses, no JVD  Respiratory: CTA b/l, good air entry b/l  Cardiovascular: RRR, no M/R/G  Gastrointestinal: soft, NTND, no masses palpable, + BS, no hepatosplenomegaly  Extremities: no c/c/e  Neurological: AAOx3      MEDICATIONS  (STANDING):  atorvastatin 80 milliGRAM(s) Oral at bedtime  carvedilol 6.25 milliGRAM(s) Oral every 12 hours  cyanocobalamin 1000 MICROGram(s) Oral daily  dextrose 5%. 1000 milliLiter(s) (50 mL/Hr) IV Continuous <Continuous>  dextrose 50% Injectable 12.5 Gram(s) IV Push once  dextrose 50% Injectable 25 Gram(s) IV Push once  dextrose 50% Injectable 25 Gram(s) IV Push once  folic acid 1 milliGRAM(s) Oral daily  insulin lispro (HumaLOG) corrective regimen sliding scale   SubCutaneous three times a day before meals  insulin lispro (HumaLOG) corrective regimen sliding scale   SubCutaneous at bedtime  levothyroxine 500 MICROGram(s) Oral daily  lisinopril 10 milliGRAM(s) Oral daily  melatonin 3 milliGRAM(s) Oral at bedtime  oxybutynin 10 milliGRAM(s) Oral two times a day  predniSONE   Tablet 100 milliGRAM(s) Oral daily    MEDICATIONS  (PRN):  dextrose 40% Gel 15 Gram(s) Oral once PRN Blood Glucose LESS THAN 70 milliGRAM(s)/deciliter  glucagon  Injectable 1 milliGRAM(s) IntraMuscular once PRN Glucose LESS THAN 70 milligrams/deciliter      Allergies    Cipro (Hives)  coconuts (Hives)  fluoroquinolone antibiotics (Unknown)  raspberries &amp; blueberries (Hives)  Vitamin D (Hives)    Intolerances        LABS:                        11.6   6.94  )-----------( 18       ( 08 Feb 2019 06:26 )             36.2     02-08    139  |  105  |  24<H>  ----------------------------<  205<H>  4.2   |  21<L>  |  1.24    Ca    8.8      08 Feb 2019 06:26  Phos  2.9     02-08  Mg     1.9     02-08    TPro  6.8  /  Alb  4.0  /  TBili  0.3  /  DBili  x   /  AST  27  /  ALT  14  /  AlkPhos  46  02-07    PT/INR - ( 07 Feb 2019 11:17 )   PT: 11.4 SEC;   INR: 1.03          PTT - ( 07 Feb 2019 11:17 )  PTT:28.9 SEC      Hepatitis C Antibody Test (02.08.19 @ 06:26)    Hepatitis C Virus S/CO Ratio: 0.11 S/CO    Hepatitis C Virus Interpretation: Nonreactive Hepatitis C AB  S/CO Ratio                        Interpretation  < 1.0                                     Non-Reactive  1.0 - 4.9                           Weakly-Reactive  > 5.0                                 Reactive  Non-Reactive: [place] with a non-reactive HCV antibody  result is considered uninfected.  No further action is  needed unless recent infection is suspected.  In these  cases, consider repeat testing later to detect  seroconversion..  Weakly-Reactive: HCV antibody test is abnormal, HCV RNA  Qualitative test will follow.  Reactive: HCV antibody test is abnormal, HCV RNA  Qualitative test will follow.  Note: HCV antibody testing is performed on the Abbott   system.    Hepatitis B Surface Antigen (02.08.19 @ 06:26)    Hepatitis B Surface Antigen: NEGATIVE    Hepatitis B Core IgM Antibody (02.08.19 @ 06:26)    Hepatitis B Core IgM Antibody: Nonreactive    Acute Hepatitis Panel (02.07.19 @ 14:10)    Hepatitis C Virus Interpretation: Nonreactive Hepatitis C AB  S/CO Ratio                        Interpretation  < 1.0                                     Non-Reactive  1.0 - 4.9                           Weakly-Reactive  > 5.0                                 Reactive  Non-Reactive: [place] with a non-reactive HCV antibody  result is considered uninfected.  No further action is  needed unless recent infection is suspected.  In these  cases, consider repeat testing later to detect  seroconversion..  Weakly-Reactive: HCV antibody test is abnormal, HCV RNA  Qualitative test will follow.  Reactive: HCV antibody test is abnormal, HCV RNA  Qualitative test will follow.  Note: HCV antibody testing is performed on the Abbott   system.    Hepatitis C Virus S/CO Ratio: 0.13 S/CO    Hepatitis B Core IgM Antibody: Nonreactive    Hepatitis B Surface Antigen: Nonreactive    Hepatitis A IgM Antibody: Nonreactive        RADIOLOGY & ADDITIONAL TESTS:  HIV-1/2 Ag/Ab Combo (02.07.19 @ 14:10)    HIV Combo Result: NonReact: If patient is suspected to be at risk and/or in the  diagnostic window period, then consider subsequent HIV  retesting with new sample.    Studies reviewed.

## 2019-02-08 NOTE — DISCHARGE NOTE ADULT - PATIENT PORTAL LINK FT
You can access the [facility] Portal, offered by [facility], by registering with the following website: [email or web address]

## 2019-02-09 LAB
ANION GAP SERPL CALC-SCNC: 13 MMO/L — SIGNIFICANT CHANGE UP (ref 7–14)
BUN SERPL-MCNC: 27 MG/DL — HIGH (ref 7–23)
CALCIUM SERPL-MCNC: 9.1 MG/DL — SIGNIFICANT CHANGE UP (ref 8.4–10.5)
CHLORIDE SERPL-SCNC: 103 MMOL/L — SIGNIFICANT CHANGE UP (ref 98–107)
CO2 SERPL-SCNC: 21 MMOL/L — LOW (ref 22–31)
CREAT SERPL-MCNC: 1.13 MG/DL — SIGNIFICANT CHANGE UP (ref 0.5–1.3)
GLUCOSE SERPL-MCNC: 191 MG/DL — HIGH (ref 70–99)
HCT VFR BLD CALC: 35.6 % — SIGNIFICANT CHANGE UP (ref 34.5–45)
HGB BLD-MCNC: 11.5 G/DL — SIGNIFICANT CHANGE UP (ref 11.5–15.5)
MAGNESIUM SERPL-MCNC: 2 MG/DL — SIGNIFICANT CHANGE UP (ref 1.6–2.6)
MCHC RBC-ENTMCNC: 29.6 PG — SIGNIFICANT CHANGE UP (ref 27–34)
MCHC RBC-ENTMCNC: 32.3 % — SIGNIFICANT CHANGE UP (ref 32–36)
MCV RBC AUTO: 91.8 FL — SIGNIFICANT CHANGE UP (ref 80–100)
NRBC # FLD: 0 K/UL — LOW (ref 25–125)
PHOSPHATE SERPL-MCNC: 3.4 MG/DL — SIGNIFICANT CHANGE UP (ref 2.5–4.5)
PLATELET # BLD AUTO: 34 K/UL — LOW (ref 150–400)
PMV BLD: 13 FL — SIGNIFICANT CHANGE UP (ref 7–13)
POTASSIUM SERPL-MCNC: 4.4 MMOL/L — SIGNIFICANT CHANGE UP (ref 3.5–5.3)
POTASSIUM SERPL-SCNC: 4.4 MMOL/L — SIGNIFICANT CHANGE UP (ref 3.5–5.3)
RBC # BLD: 3.88 M/UL — SIGNIFICANT CHANGE UP (ref 3.8–5.2)
RBC # FLD: 13.8 % — SIGNIFICANT CHANGE UP (ref 10.3–14.5)
SODIUM SERPL-SCNC: 137 MMOL/L — SIGNIFICANT CHANGE UP (ref 135–145)
WBC # BLD: 8.35 K/UL — SIGNIFICANT CHANGE UP (ref 3.8–10.5)
WBC # FLD AUTO: 8.35 K/UL — SIGNIFICANT CHANGE UP (ref 3.8–10.5)

## 2019-02-09 PROCEDURE — 99233 SBSQ HOSP IP/OBS HIGH 50: CPT | Mod: GC

## 2019-02-09 RX ORDER — INSULIN LISPRO 100/ML
5 VIAL (ML) SUBCUTANEOUS
Qty: 0 | Refills: 0 | Status: DISCONTINUED | OUTPATIENT
Start: 2019-02-09 | End: 2019-02-11

## 2019-02-09 RX ORDER — INSULIN LISPRO 100/ML
4 VIAL (ML) SUBCUTANEOUS
Qty: 0 | Refills: 0 | Status: DISCONTINUED | OUTPATIENT
Start: 2019-02-09 | End: 2019-02-09

## 2019-02-09 RX ADMIN — CARVEDILOL PHOSPHATE 6.25 MILLIGRAM(S): 80 CAPSULE, EXTENDED RELEASE ORAL at 18:28

## 2019-02-09 RX ADMIN — Medication 500 MICROGRAM(S): at 05:58

## 2019-02-09 RX ADMIN — Medication 10 MILLIGRAM(S): at 18:28

## 2019-02-09 RX ADMIN — Medication 5 UNIT(S): at 18:47

## 2019-02-09 RX ADMIN — LISINOPRIL 10 MILLIGRAM(S): 2.5 TABLET ORAL at 05:59

## 2019-02-09 RX ADMIN — CARVEDILOL PHOSPHATE 6.25 MILLIGRAM(S): 80 CAPSULE, EXTENDED RELEASE ORAL at 05:58

## 2019-02-09 RX ADMIN — Medication 5: at 13:33

## 2019-02-09 RX ADMIN — Medication 1: at 09:10

## 2019-02-09 RX ADMIN — Medication 100 MILLIGRAM(S): at 05:58

## 2019-02-09 RX ADMIN — Medication 1 MILLIGRAM(S): at 11:40

## 2019-02-09 RX ADMIN — PREGABALIN 1000 MICROGRAM(S): 225 CAPSULE ORAL at 11:40

## 2019-02-09 RX ADMIN — Medication 3 MILLIGRAM(S): at 21:36

## 2019-02-09 RX ADMIN — ATORVASTATIN CALCIUM 80 MILLIGRAM(S): 80 TABLET, FILM COATED ORAL at 21:36

## 2019-02-09 RX ADMIN — Medication 3: at 18:28

## 2019-02-09 RX ADMIN — Medication 10 MILLIGRAM(S): at 05:58

## 2019-02-09 NOTE — PROGRESS NOTE ADULT - PROBLEM SELECTOR PLAN 1
-admitted w/ ITP 14K, now 34K  -per Heme, concern for ITP w/ large plts and elevated MVP         -only new medication Bydureon, no recent illness, no high risk behaviors  -initiated on 100mg prednisone QD  -AUBRIE pending. HIV, HepB sAg negative.  -no evidence of active bleeding  -txf goal plts > 10.

## 2019-02-09 NOTE — PROGRESS NOTE ADULT - SUBJECTIVE AND OBJECTIVE BOX
Patient is a 63y old  Female who presents with a chief complaint of Thrombocytopenia (08 Feb 2019 13:53)      Overnight Events:      REVIEW OF SYSTEMS:  CONSTITUTIONAL: No weakness, fevers or chills  EYES/ENT: No visual changes, no throat pain   RESPIRATORY: No cough, wheezing, hemoptysis; No shortness of breath  CARDIOVASCULAR: No chest pain or palpitations  GASTROINTESTINAL: No abdominal, nausea, vomiting, or hematemesis; No diarrhea or constipation. No melena or hematochezia.  GENITOURINARY: No dysuria, frequency or hematuria  NEUROLOGICAL: No dizziness, numbness, or weakness  SKIN: No itching, burning, rashes, or lesions   All other review of systems is negative unless indicated above.    VITAL SIGNS:  Vital Signs Last 24 Hrs  T(C): 36.4 (09 Feb 2019 05:50), Max: 37.2 (08 Feb 2019 22:53)  T(F): 97.5 (09 Feb 2019 05:50), Max: 98.9 (08 Feb 2019 22:53)  HR: 63 (09 Feb 2019 05:50) (63 - 67)  BP: 128/58 (09 Feb 2019 05:50) (96/52 - 136/65)  BP(mean): --  RR: 18 (09 Feb 2019 05:50) (17 - 18)  SpO2: 100% (09 Feb 2019 05:50) (100% - 100%)    PHYSICAL EXAM:   GENERAL: no acute distress  HEENT: NC/AT, EOMI, neck supple, MMM  RESPIRATORY: LCTAB/L, no rhonchi, rales, or wheezing  CARDIOVASCULAR: RRR, no murmurs, gallops, rubs  ABDOMINAL: soft, non-tender, non-distended, positive bowel sounds   EXTREMITIES: no clubbing, cyanosis, or edema  NEUROLOGICAL: alert and oriented x 3, non-focal  SKIN: no rashes or lesions   MUSCULOSKELETAL: no gross joint deformity                          11.5   8.35  )-----------( 34       ( 09 Feb 2019 07:47 )             35.6     02-09    137  |  103  |  27<H>  ----------------------------<  191<H>  4.4   |  21<L>  |  1.13    Ca    9.1      09 Feb 2019 07:47  Phos  3.4     02-09  Mg     2.0     02-09          CAPILLARY BLOOD GLUCOSE      POCT Blood Glucose.: 188 mg/dL (09 Feb 2019 09:10)  POCT Blood Glucose.: 248 mg/dL (08 Feb 2019 23:01)  POCT Blood Glucose.: 160 mg/dL (08 Feb 2019 18:09)  POCT Blood Glucose.: 199 mg/dL (08 Feb 2019 12:56)    I&O's Summary      MEDICATIONS  (STANDING):  atorvastatin 80 milliGRAM(s) Oral at bedtime  carvedilol 6.25 milliGRAM(s) Oral every 12 hours  cyanocobalamin 1000 MICROGram(s) Oral daily  dextrose 5%. 1000 milliLiter(s) (50 mL/Hr) IV Continuous <Continuous>  dextrose 50% Injectable 12.5 Gram(s) IV Push once  dextrose 50% Injectable 25 Gram(s) IV Push once  dextrose 50% Injectable 25 Gram(s) IV Push once  folic acid 1 milliGRAM(s) Oral daily  insulin lispro (HumaLOG) corrective regimen sliding scale   SubCutaneous three times a day before meals  insulin lispro (HumaLOG) corrective regimen sliding scale   SubCutaneous at bedtime  levothyroxine 500 MICROGram(s) Oral daily  lisinopril 10 milliGRAM(s) Oral daily  melatonin 3 milliGRAM(s) Oral at bedtime  oxybutynin 10 milliGRAM(s) Oral two times a day  predniSONE   Tablet 100 milliGRAM(s) Oral daily Patient is a 63y old  Female who presents with a chief complaint of Thrombocytopenia (08 Feb 2019 13:53)      Overnight Events:  No acute events ON. Patient has notice some new areas on ecchymoses on her lateral R thigh. She otherwise denies epistaxis or gingival bleeding.     REVIEW OF SYSTEMS:  CONSTITUTIONAL: No weakness, fevers or chills  EYES/ENT: No visual changes, no throat pain   RESPIRATORY: No cough, wheezing, hemoptysis; No shortness of breath  CARDIOVASCULAR: No chest pain or palpitations  GASTROINTESTINAL: No abdominal, nausea, vomiting, or hematemesis; No diarrhea or constipation. No melena or hematochezia.  GENITOURINARY: No dysuria, frequency or hematuria  NEUROLOGICAL: No dizziness, numbness, or weakness  SKIN: No itching, burning, rashes, or lesions   All other review of systems is negative unless indicated above.    VITAL SIGNS:  Vital Signs Last 24 Hrs  T(C): 36.4 (09 Feb 2019 05:50), Max: 37.2 (08 Feb 2019 22:53)  T(F): 97.5 (09 Feb 2019 05:50), Max: 98.9 (08 Feb 2019 22:53)  HR: 63 (09 Feb 2019 05:50) (63 - 67)  BP: 128/58 (09 Feb 2019 05:50) (96/52 - 136/65)  BP(mean): --  RR: 18 (09 Feb 2019 05:50) (17 - 18)  SpO2: 100% (09 Feb 2019 05:50) (100% - 100%)    PHYSICAL EXAM:   GENERAL: no acute distress  HEENT: NC/AT, EOMI, neck supple, MMM  RESPIRATORY: LCTAB/L, no rhonchi, rales, or wheezing  CARDIOVASCULAR: RRR, no murmurs, gallops, rubs  ABDOMINAL: soft, non-tender, non-distended, positive bowel sounds   EXTREMITIES: no clubbing, cyanosis, or edema  NEUROLOGICAL: alert and oriented x 3, non-focal  SKIN: Ecchymoses noted on arms, neck, and lateral R thigh  MUSCULOSKELETAL: no gross joint deformity                          11.5   8.35  )-----------( 34       ( 09 Feb 2019 07:47 )             35.6     02-09    137  |  103  |  27<H>  ----------------------------<  191<H>  4.4   |  21<L>  |  1.13    Ca    9.1      09 Feb 2019 07:47  Phos  3.4     02-09  Mg     2.0     02-09          CAPILLARY BLOOD GLUCOSE      POCT Blood Glucose.: 188 mg/dL (09 Feb 2019 09:10)  POCT Blood Glucose.: 248 mg/dL (08 Feb 2019 23:01)  POCT Blood Glucose.: 160 mg/dL (08 Feb 2019 18:09)  POCT Blood Glucose.: 199 mg/dL (08 Feb 2019 12:56)    I&O's Summary      MEDICATIONS  (STANDING):  atorvastatin 80 milliGRAM(s) Oral at bedtime  carvedilol 6.25 milliGRAM(s) Oral every 12 hours  cyanocobalamin 1000 MICROGram(s) Oral daily  dextrose 5%. 1000 milliLiter(s) (50 mL/Hr) IV Continuous <Continuous>  dextrose 50% Injectable 12.5 Gram(s) IV Push once  dextrose 50% Injectable 25 Gram(s) IV Push once  dextrose 50% Injectable 25 Gram(s) IV Push once  folic acid 1 milliGRAM(s) Oral daily  insulin lispro (HumaLOG) corrective regimen sliding scale   SubCutaneous three times a day before meals  insulin lispro (HumaLOG) corrective regimen sliding scale   SubCutaneous at bedtime  levothyroxine 500 MICROGram(s) Oral daily  lisinopril 10 milliGRAM(s) Oral daily  melatonin 3 milliGRAM(s) Oral at bedtime  oxybutynin 10 milliGRAM(s) Oral two times a day  predniSONE   Tablet 100 milliGRAM(s) Oral daily

## 2019-02-09 NOTE — PROGRESS NOTE ADULT - PROBLEM SELECTOR PLAN 4
-2/8 A1c 6.0 on bydureon + glipizide  -LDSI, will uptitrate PRN w/ steroids  -initiated 4 units of humalog tidac; will titrate as necessary

## 2019-02-10 LAB
ANA TITR SER: NEGATIVE — SIGNIFICANT CHANGE UP
HCT VFR BLD CALC: 35 % — SIGNIFICANT CHANGE UP (ref 34.5–45)
HGB BLD-MCNC: 11.2 G/DL — LOW (ref 11.5–15.5)
MCHC RBC-ENTMCNC: 29.9 PG — SIGNIFICANT CHANGE UP (ref 27–34)
MCHC RBC-ENTMCNC: 32 % — SIGNIFICANT CHANGE UP (ref 32–36)
MCV RBC AUTO: 93.6 FL — SIGNIFICANT CHANGE UP (ref 80–100)
NRBC # FLD: 0 K/UL — LOW (ref 25–125)
PLATELET # BLD AUTO: 45 K/UL — LOW (ref 150–400)
PMV BLD: 12.6 FL — SIGNIFICANT CHANGE UP (ref 7–13)
RBC # BLD: 3.74 M/UL — LOW (ref 3.8–5.2)
RBC # FLD: 13.8 % — SIGNIFICANT CHANGE UP (ref 10.3–14.5)
WBC # BLD: 9.05 K/UL — SIGNIFICANT CHANGE UP (ref 3.8–10.5)
WBC # FLD AUTO: 9.05 K/UL — SIGNIFICANT CHANGE UP (ref 3.8–10.5)

## 2019-02-10 PROCEDURE — 99232 SBSQ HOSP IP/OBS MODERATE 35: CPT | Mod: GC

## 2019-02-10 RX ADMIN — Medication 1 MILLIGRAM(S): at 13:23

## 2019-02-10 RX ADMIN — Medication 5 UNIT(S): at 09:07

## 2019-02-10 RX ADMIN — PREGABALIN 1000 MICROGRAM(S): 225 CAPSULE ORAL at 13:23

## 2019-02-10 RX ADMIN — Medication 1: at 09:07

## 2019-02-10 RX ADMIN — Medication 3: at 13:24

## 2019-02-10 RX ADMIN — Medication 2: at 18:08

## 2019-02-10 RX ADMIN — Medication 5 UNIT(S): at 13:24

## 2019-02-10 RX ADMIN — Medication 10 MILLIGRAM(S): at 06:05

## 2019-02-10 RX ADMIN — Medication 500 MICROGRAM(S): at 06:04

## 2019-02-10 RX ADMIN — Medication 10 MILLIGRAM(S): at 18:07

## 2019-02-10 RX ADMIN — ATORVASTATIN CALCIUM 80 MILLIGRAM(S): 80 TABLET, FILM COATED ORAL at 21:08

## 2019-02-10 RX ADMIN — Medication 3 MILLIGRAM(S): at 21:08

## 2019-02-10 RX ADMIN — Medication 100 MILLIGRAM(S): at 06:04

## 2019-02-10 RX ADMIN — CARVEDILOL PHOSPHATE 6.25 MILLIGRAM(S): 80 CAPSULE, EXTENDED RELEASE ORAL at 18:07

## 2019-02-10 RX ADMIN — Medication 5 UNIT(S): at 18:08

## 2019-02-10 NOTE — PROGRESS NOTE ADULT - SUBJECTIVE AND OBJECTIVE BOX
Eliza Block MD PGY1  [phone]    Patient is a 63y old  Female who presents with a chief complaint of Thrombocytopenia (09 Feb 2019 12:10)    SUBJECTIVE/OVERNIGHT EVENTS     Vital Signs Last 24 Hrs  T(C): 36.7 (10 Feb 2019 05:14), Max: 36.7 (09 Feb 2019 15:53)  T(F): 98.1 (10 Feb 2019 05:14), Max: 98.1 (09 Feb 2019 15:53)  HR: 60 (10 Feb 2019 06:22) (56 - 64)  BP: 120/63 (10 Feb 2019 05:14) (120/63 - 146/60)  BP(mean): --  RR: 18 (10 Feb 2019 05:14) (18 - 18)  SpO2: 99% (10 Feb 2019 05:14) (99% - 100%)    PHYSICAL EXAM:  GENERAL: NAD, well-developed  HEAD:  Atraumatic, Normocephalic  EYES: EOMI, PERRLA, conjunctiva and sclera clear  NECK: Supple, No JVD  CHEST/LUNG: Clear to auscultation bilaterally; No wheeze  HEART: Regular rate and rhythm; No murmurs, rubs, or gallops  ABDOMEN: Soft, Nontender, Nondistended; Bowel sounds present  EXTREMITIES:  2+ Peripheral Pulses, No clubbing, cyanosis, or edema  PSYCH: AAOx3  NEUROLOGY: non-focal  SKIN: No rashes or lesions Eliza Block MD PGY1  [phone]    Patient is a 63y old  Female who presents with a chief complaint of Thrombocytopenia (09 Feb 2019 12:10)    SUBJECTIVE/OVERNIGHT EVENTS   No acute events O/N. No concerns this AM.    Vital Signs Last 24 Hrs  T(C): 36.7 (10 Feb 2019 05:14), Max: 36.7 (09 Feb 2019 15:53)  T(F): 98.1 (10 Feb 2019 05:14), Max: 98.1 (09 Feb 2019 15:53)  HR: 60 (10 Feb 2019 06:22) (56 - 64)  BP: 120/63 (10 Feb 2019 05:14) (120/63 - 146/60)  BP(mean): --  RR: 18 (10 Feb 2019 05:14) (18 - 18)  SpO2: 99% (10 Feb 2019 05:14) (99% - 100%)    PHYSICAL EXAM:  GENERAL: NAD, well-developed  HEAD:  Atraumatic, Normocephalic  EYES: EOMI, PERRLA, conjunctiva and sclera clear  NECK: Supple, No JVD  CHEST/LUNG: Clear to auscultation bilaterally; No wheeze  HEART: Regular rate and rhythm; No murmurs, rubs, or gallops  ABDOMEN: Soft, Nontender, Nondistended; Bowel sounds present  EXTREMITIES:  2+ Peripheral Pulses, No clubbing, cyanosis, or edema                          New R inner thigh ecchymosis, no underlying hematoma.  PSYCH: AAOx3  NEUROLOGY: non-focal  SKIN: No rashes or lesions        MEDICATIONS  (STANDING):  atorvastatin 80 milliGRAM(s) Oral at bedtime  carvedilol 6.25 milliGRAM(s) Oral every 12 hours  cyanocobalamin 1000 MICROGram(s) Oral daily  dextrose 5%. 1000 milliLiter(s) (50 mL/Hr) IV Continuous <Continuous>  dextrose 50% Injectable 12.5 Gram(s) IV Push once  dextrose 50% Injectable 25 Gram(s) IV Push once  dextrose 50% Injectable 25 Gram(s) IV Push once  folic acid 1 milliGRAM(s) Oral daily  insulin lispro (HumaLOG) corrective regimen sliding scale   SubCutaneous three times a day before meals  insulin lispro (HumaLOG) corrective regimen sliding scale   SubCutaneous at bedtime  insulin lispro Injectable (HumaLOG) 5 Unit(s) SubCutaneous three times a day before meals  levothyroxine 500 MICROGram(s) Oral daily  lisinopril 10 milliGRAM(s) Oral daily  melatonin 3 milliGRAM(s) Oral at bedtime  oxybutynin 10 milliGRAM(s) Oral two times a day  predniSONE   Tablet 100 milliGRAM(s) Oral daily    MEDICATIONS  (PRN):  dextrose 40% Gel 15 Gram(s) Oral once PRN Blood Glucose LESS THAN 70 milliGRAM(s)/deciliter  glucagon  Injectable 1 milliGRAM(s) IntraMuscular once PRN Glucose LESS THAN 70 milligrams/deciliter    CAPILLARY BLOOD GLUCOSE      POCT Blood Glucose.: 230 mg/dL (09 Feb 2019 21:56)  POCT Blood Glucose.: 278 mg/dL (09 Feb 2019 18:21)  POCT Blood Glucose.: 366 mg/dL (09 Feb 2019 12:48)  POCT Blood Glucose.: 188 mg/dL (09 Feb 2019 09:10)    I&O's Summary      LABS                        11.2   9.05  )-----------( 45       ( 10 Feb 2019 06:00 )             35.0                         11.5   8.35  )-----------( 34       ( 09 Feb 2019 07:47 )             35.6     02-09    137  |  103  |  27<H>  ----------------------------<  191<H>  4.4   |  21<L>  |  1.13    Ca    9.1      09 Feb 2019 07:47  Phos  3.4     02-09  Mg     2.0     02-09

## 2019-02-10 NOTE — PROGRESS NOTE ADULT - PROBLEM SELECTOR PLAN 1
-admitted w/ ITP 14K, now 34K  -per Heme, concern for ITP w/ large plts and elevated MVP         -only new medication Bydureon, no recent illness, no high risk behaviors  -initiated on 100mg prednisone QD  -AUBRIE pending. HIV, HepB sAg negative.  -no evidence of active bleeding  -txf goal plts > 10. -plts continuing to uptrend. AM 45 (admission 14).  -per Heme, concern for ITP w/ large plts and elevated MVP         -only new medication Bydureon, no recent illness, no high risk behaviors  -cont on 100mg prednisone QD x 3 wks  -AUBRIE pending. HIV, HepB sAg negative.  -no evidence of active bleeding  -txf goal plts > 10.

## 2019-02-10 NOTE — PROGRESS NOTE ADULT - PROBLEM SELECTOR PLAN 4
-2/8 A1c 6.0 on bydureon + glipizide  -LDSI, will uptitrate PRN w/ steroids  -initiated 4 units of humalog tidac; will titrate as necessary -2/8 A1c 6.0 on bydureon + glipizide  -LDSI, will uptitrate PRN w/ steroids  -initiated 5 units of humalog tidac

## 2019-02-10 NOTE — PROGRESS NOTE ADULT - PROBLEM SELECTOR PLAN 2
-on home dose levothyroxine 500mcg QD  -TSH remains highly elevated in 17s  -will clarify and provide education on properly taking medications  -denies any leg swelling, cold intolerance, excessive fatigue, constipation -on home dose levothyroxine 500mcg QD         -takes medications appropriately   -TSH remains highly elevated in 17s, but per pt, improved from previous tests  -denies any leg swelling, cold intolerance, excessive fatigue, constipation

## 2019-02-11 VITALS
SYSTOLIC BLOOD PRESSURE: 125 MMHG | HEART RATE: 64 BPM | TEMPERATURE: 98 F | DIASTOLIC BLOOD PRESSURE: 66 MMHG | OXYGEN SATURATION: 100 % | RESPIRATION RATE: 18 BRPM

## 2019-02-11 LAB
HCT VFR BLD CALC: 37.4 % — SIGNIFICANT CHANGE UP (ref 34.5–45)
HGB BLD-MCNC: 11.9 G/DL — SIGNIFICANT CHANGE UP (ref 11.5–15.5)
MCHC RBC-ENTMCNC: 29.7 PG — SIGNIFICANT CHANGE UP (ref 27–34)
MCHC RBC-ENTMCNC: 31.8 % — LOW (ref 32–36)
MCV RBC AUTO: 93.3 FL — SIGNIFICANT CHANGE UP (ref 80–100)
NRBC # FLD: 0 K/UL — LOW (ref 25–125)
PLATELET # BLD AUTO: 59 K/UL — LOW (ref 150–400)
PMV BLD: 12.2 FL — SIGNIFICANT CHANGE UP (ref 7–13)
RBC # BLD: 4.01 M/UL — SIGNIFICANT CHANGE UP (ref 3.8–5.2)
RBC # FLD: 13.8 % — SIGNIFICANT CHANGE UP (ref 10.3–14.5)
WBC # BLD: 10.52 K/UL — HIGH (ref 3.8–10.5)
WBC # FLD AUTO: 10.52 K/UL — HIGH (ref 3.8–10.5)

## 2019-02-11 PROCEDURE — 99239 HOSP IP/OBS DSCHRG MGMT >30: CPT

## 2019-02-11 RX ORDER — GLIPIZIDE/METFORMIN HCL 2.5-500 MG
1 TABLET ORAL
Qty: 0 | Refills: 0 | COMMUNITY

## 2019-02-11 RX ORDER — INSULIN LISPRO 100/ML
7 VIAL (ML) SUBCUTANEOUS
Qty: 0 | Refills: 0 | Status: DISCONTINUED | OUTPATIENT
Start: 2019-02-11 | End: 2019-02-11

## 2019-02-11 RX ORDER — GLIPIZIDE/METFORMIN HCL 2.5-500 MG
1 TABLET ORAL
Qty: 60 | Refills: 0
Start: 2019-02-11 | End: 2019-03-12

## 2019-02-11 RX ADMIN — Medication 2: at 09:08

## 2019-02-11 RX ADMIN — PREGABALIN 1000 MICROGRAM(S): 225 CAPSULE ORAL at 11:49

## 2019-02-11 RX ADMIN — Medication 10 MILLIGRAM(S): at 06:20

## 2019-02-11 RX ADMIN — Medication 500 MICROGRAM(S): at 06:19

## 2019-02-11 RX ADMIN — LISINOPRIL 10 MILLIGRAM(S): 2.5 TABLET ORAL at 06:21

## 2019-02-11 RX ADMIN — CARVEDILOL PHOSPHATE 6.25 MILLIGRAM(S): 80 CAPSULE, EXTENDED RELEASE ORAL at 06:20

## 2019-02-11 RX ADMIN — Medication 100 MILLIGRAM(S): at 06:22

## 2019-02-11 RX ADMIN — Medication 4: at 13:10

## 2019-02-11 RX ADMIN — Medication 7 UNIT(S): at 13:10

## 2019-02-11 RX ADMIN — Medication 1 MILLIGRAM(S): at 11:49

## 2019-02-11 NOTE — PROGRESS NOTE ADULT - ASSESSMENT
62 y/o F with multiple medical comorbidities including well controlled T2DM who is here for new finding of severe thrombocytopenia.     #Thrombocytopenia  Peripheral smear and clinical scenario consistent with ITP.   Hepatitis panel and HIV negative. Also checking AUBRIE.   s/p beginning of therapy with prednisone 1 mg/kg daily.   Will require close management of fingersticks in the setting of steroids, well controlled recently.   Monitor CBC daily.  May ultimately require bone marrow evaluation as outpatient.    Will follow closely.     Bradley Goldberg M.D. - PGY-6  Hematology/Oncology Fellow  Pager: [phone] (NS)   [phone] ([facility])  Weekends and after 5PM please contact on call fellow.
SL is a 62yo F w/ PMHx well controlled diabetes, HTN, HLD, hypothyroidism, and breast lumps admitted for outpt finding of thrombocytopenia suspected to have ITP.
SL is a 64yo F w/ PMHx well controlled diabetes, HTN, HLD, hypothyroidism, and breast lumps admitted for outpt finding of thrombocytopenia suspected to have ITP.
SL is a 62yo F w/ PMHx well controlled diabetes, HTN, HLD, hypothyroidism, and breast lumps admitted for outpt finding of thrombocytopenia suspected to have ITP.
SL is a 62yo F w/ PMHx well controlled diabetes, HTN, HLD, hypothyroidism, and breast lumps admitted for outpt finding of thrombocytopenia suspected to have ITP.

## 2019-02-11 NOTE — PROGRESS NOTE ADULT - PROBLEM SELECTOR PLAN 1
-plts continuing to uptrend. AM 45 (admission 14).  -per Heme, concern for ITP w/ large plts and elevated MVP         -only new medication Bydureon, no recent illness, no high risk behaviors  -cont on 100mg prednisone QD x 3 wks  -AUBRIE pending. HIV, HepB sAg negative.  -no evidence of active bleeding  -txf goal plts > 10. -plts continuing to uptrend, pending final CBC this AM but likely okay to d/c if plts > 50  -per Heme, concern for ITP w/ large plts and elevated MVP         -only new medication Bydureon, no recent illness, no high risk behaviors  -cont on 100mg prednisone QD, pending final heme/onc recs for taper  -HIV, HepB, AUBRIE negative.  -no evidence of active bleeding  -txf goal plts > 10.

## 2019-02-11 NOTE — PROGRESS NOTE ADULT - SUBJECTIVE AND OBJECTIVE BOX
Eliza Block MD PGY1  [phone]    Patient is a 63y old  Female who presents with a chief complaint of Thrombocytopenia (10 Feb 2019 07:18)    SUBJECTIVE/OVERNIGHT EVENTS   No acute events O/N.    Vital Signs Last 24 Hrs  T(C): 36.4 (11 Feb 2019 06:16), Max: 36.7 (10 Feb 2019 15:26)  T(F): 97.6 (11 Feb 2019 06:16), Max: 98 (10 Feb 2019 15:26)  HR: 64 (11 Feb 2019 06:16) (60 - 66)  BP: 125/66 (11 Feb 2019 06:16) (104/68 - 148/84)  BP(mean): --  RR: 18 (11 Feb 2019 06:16) (18 - 18)  SpO2: 100% (11 Feb 2019 06:16) (100% - 100%)    PHYSICAL EXAM:  GENERAL: NAD, well-developed  HEAD:  Atraumatic, Normocephalic  EYES: EOMI, PERRLA, conjunctiva and sclera clear  NECK: Supple, No JVD  CHEST/LUNG: Clear to auscultation bilaterally; No wheeze  HEART: Regular rate and rhythm; No murmurs, rubs, or gallops  ABDOMEN: Soft, Nontender, Nondistended; Bowel sounds present  EXTREMITIES:  2+ Peripheral Pulses, No clubbing, cyanosis, or edema  PSYCH: AAOx3  NEUROLOGY: non-focal  SKIN: No rashes or lesions Eliza Block MD PGY1  [phone]    Patient is a 63y old  Female who presents with a chief complaint of Thrombocytopenia (10 Feb 2019 07:18)    SUBJECTIVE/OVERNIGHT EVENTS   No acute events O/N. Doing well this AM.    Vital Signs Last 24 Hrs  T(C): 36.4 (11 Feb 2019 06:16), Max: 36.7 (10 Feb 2019 15:26)  T(F): 97.6 (11 Feb 2019 06:16), Max: 98 (10 Feb 2019 15:26)  HR: 64 (11 Feb 2019 06:16) (60 - 66)  BP: 125/66 (11 Feb 2019 06:16) (104/68 - 148/84)  BP(mean): --  RR: 18 (11 Feb 2019 06:16) (18 - 18)  SpO2: 100% (11 Feb 2019 06:16) (100% - 100%)    PHYSICAL EXAM:  GENERAL: NAD, well-developed  HEAD:  Atraumatic, Normocephalic  EYES: EOMI, conjunctiva and sclera clear  NECK: Supple  CHEST/LUNG: Clear to auscultation bilaterally; No wheeze  HEART: Regular rate and rhythm; No murmurs, rubs, or gallops  ABDOMEN: Soft, Nontender, Nondistended; Bowel sounds present  EXTREMITIES:  2+ Peripheral Pulses, No clubbing, cyanosis, or edema                          R inner thigh ecchymosis stable, no underlying hematoma.  PSYCH: AAOx3  NEUROLOGY: non-focal  SKIN: No rashes or lesions        MEDICATIONS  (STANDING):  atorvastatin 80 milliGRAM(s) Oral at bedtime  carvedilol 6.25 milliGRAM(s) Oral every 12 hours  cyanocobalamin 1000 MICROGram(s) Oral daily  dextrose 5%. 1000 milliLiter(s) (50 mL/Hr) IV Continuous <Continuous>  dextrose 50% Injectable 12.5 Gram(s) IV Push once  dextrose 50% Injectable 25 Gram(s) IV Push once  dextrose 50% Injectable 25 Gram(s) IV Push once  folic acid 1 milliGRAM(s) Oral daily  insulin lispro (HumaLOG) corrective regimen sliding scale   SubCutaneous three times a day before meals  insulin lispro (HumaLOG) corrective regimen sliding scale   SubCutaneous at bedtime  insulin lispro Injectable (HumaLOG) 7 Unit(s) SubCutaneous three times a day before meals  levothyroxine 500 MICROGram(s) Oral daily  lisinopril 10 milliGRAM(s) Oral daily  melatonin 3 milliGRAM(s) Oral at bedtime  oxybutynin 10 milliGRAM(s) Oral two times a day  predniSONE   Tablet 100 milliGRAM(s) Oral daily    MEDICATIONS  (PRN):  dextrose 40% Gel 15 Gram(s) Oral once PRN Blood Glucose LESS THAN 70 milliGRAM(s)/deciliter  glucagon  Injectable 1 milliGRAM(s) IntraMuscular once PRN Glucose LESS THAN 70 milligrams/deciliter    CAPILLARY BLOOD GLUCOSE      POCT Blood Glucose.: 245 mg/dL (10 Feb 2019 22:16)  POCT Blood Glucose.: 246 mg/dL (10 Feb 2019 18:05)  POCT Blood Glucose.: 256 mg/dL (10 Feb 2019 13:15)  POCT Blood Glucose.: 186 mg/dL (10 Feb 2019 09:00)    I&O's Summary      LABS  PENDING AM LABS 2/11.               11.2   9.05  )-----------( 45       ( 10 Feb 2019 06:00 )             35.0

## 2019-02-11 NOTE — PROGRESS NOTE ADULT - PROBLEM SELECTOR PLAN 5
-DVT: holding all AC for TCP  -Diet: Carb consistent -DVT: IMPROVE 0. holding all AC for TCP  -Dispo: Baseline independent. Home when medically stable.  -Diet: Carb consistent

## 2019-02-11 NOTE — PROGRESS NOTE ADULT - PROBLEM SELECTOR PLAN 2
-on home dose levothyroxine 500mcg QD         -takes medications appropriately   -TSH remains highly elevated in 17s, but per pt, improved from previous tests  -denies any leg swelling, cold intolerance, excessive fatigue, constipation -stable  -on home dose levothyroxine 500mcg QD         -takes medications appropriately   -TSH remains highly elevated in 17s, but per pt, improved from previous tests  -denies any leg swelling, cold intolerance, excessive fatigue, constipation

## 2019-02-11 NOTE — PROGRESS NOTE ADULT - ATTENDING COMMENTS
Patient seen and examined. Agree with above note by resident.    #ITP - at this time patient without any complaints. Mild ecchymosis but denies any signs of active bleeding. No new medications. No fevers, coags negative, normal renal function, no recent heparin exposure. At this time suspicion for ITP is highest. Heme evaluation appreciated. Start prednisone for presumed ITP. Monitor cbc.     #Hypothyroid - recent increase in dose from 400mg to 500mg. Per patient during last visit no dose adjustment was made as TSH was improving. At this time would not change dose and have pt follow up with endocrine as outpt.     #DM2 with hyperglycemia - well controlled as outpt however elevated due to steroids. Will require insulin as inpt.     Plan discussed with patient and HS .
multiple medical comorbidities admitted with plts of 14k, asymptomatic. Likely ITP, r/o secondary causes as above. On prednisone 1mg/kg. Monitor CBC daily
Patient seen and examined. Agree with above note by resident.    #ITP - at this time patient without any complaints. Mild ecchymosis but denies any signs of active bleeding. No new medications. No fevers, coags negative, normal renal function, no recent heparin exposure. At this time suspicion for ITP is highest. Heme evaluation appreciated. Start prednisone for presumed ITP. Monitor cbc.     Plan discussed with patient and HS .
Patient seen and examined. Agree with above note by resident.    #ITP - at this time patient without any complaints. Mild ecchymosis but denies any signs of active bleeding. No new medications. No fevers, coags negative, normal renal function, no recent heparin exposure. At this time suspicion for ITP is highest. Heme evaluation appreciated. Start prednisone for presumed ITP. Monitor cbc. LIkely DC home tomorrow. Will discuss with heme dose of prednisone on discharge.     #Hypothyroid - recent increase in dose from 400mg to 500mg. Per patient during last visit no dose adjustment was made as TSH was improving. At this time would not change dose and have pt follow up with endocrine as outpt.     #DM2 with hyperglycemia - well controlled as outpt however elevated due to steroids. Requiring humalog with meals. Consider adding prandin to home regimen for better FS control while pt is on steroids.     Plan discussed with patient and HS. Likely DC home tomorrow once platelets >50K
Plts 59 today  Medically stable for d/c home today, c/w prednisone 100 mg po daily  Has f/u appt with primary Heme/Onc on Wednesday of this week for further taper of prednisone.  Increase glipizide/metformin to double home dose, monitor home FS, close PMD/Endo f/u.     35 min spent on dc planning

## 2019-02-11 NOTE — PROGRESS NOTE ADULT - PROBLEM SELECTOR PLAN 4
-2/8 A1c 6.0 on bydureon + glipizide  -LDSI, will uptitrate PRN w/ steroids  -initiated 5 units of humalog tidac -2/8 A1c 6.0 on bydureon + glipizide  -LDSI, will uptitrate PRN w/ steroids  -initiated 5 units of humalog tidac        -discussing possibility of increasing home PO regimens vs addition of prandin

## 2019-02-15 ENCOUNTER — APPOINTMENT (OUTPATIENT)
Dept: ULTRASOUND IMAGING | Facility: CLINIC | Age: 64
End: 2019-02-15
Payer: COMMERCIAL

## 2019-02-15 ENCOUNTER — OUTPATIENT (OUTPATIENT)
Dept: OUTPATIENT SERVICES | Facility: HOSPITAL | Age: 64
LOS: 1 days | End: 2019-02-15
Payer: COMMERCIAL

## 2019-02-15 DIAGNOSIS — Z98.89 OTHER SPECIFIED POSTPROCEDURAL STATES: Chronic | ICD-10-CM

## 2019-02-15 DIAGNOSIS — C50.811 MALIGNANT NEOPLASM OF OVERLAPPING SITES OF RIGHT FEMALE BREAST: ICD-10-CM

## 2019-02-15 DIAGNOSIS — D69.6 THROMBOCYTOPENIA, UNSPECIFIED: ICD-10-CM

## 2019-02-15 PROCEDURE — 76700 US EXAM ABDOM COMPLETE: CPT | Mod: 26

## 2019-02-15 PROCEDURE — 76856 US EXAM PELVIC COMPLETE: CPT | Mod: 26

## 2019-02-15 PROCEDURE — 76700 US EXAM ABDOM COMPLETE: CPT

## 2019-02-15 PROCEDURE — 76856 US EXAM PELVIC COMPLETE: CPT

## 2019-03-28 ENCOUNTER — OUTPATIENT (OUTPATIENT)
Dept: OUTPATIENT SERVICES | Facility: HOSPITAL | Age: 64
LOS: 1 days | End: 2019-03-28
Payer: COMMERCIAL

## 2019-03-28 ENCOUNTER — APPOINTMENT (OUTPATIENT)
Dept: MRI IMAGING | Facility: CLINIC | Age: 64
End: 2019-03-28
Payer: COMMERCIAL

## 2019-03-28 DIAGNOSIS — Z00.8 ENCOUNTER FOR OTHER GENERAL EXAMINATION: ICD-10-CM

## 2019-03-28 DIAGNOSIS — Z98.89 OTHER SPECIFIED POSTPROCEDURAL STATES: Chronic | ICD-10-CM

## 2019-03-28 PROCEDURE — 74183 MRI ABD W/O CNTR FLWD CNTR: CPT | Mod: 26

## 2019-03-28 PROCEDURE — 74183 MRI ABD W/O CNTR FLWD CNTR: CPT

## 2019-03-28 PROCEDURE — A9585: CPT

## 2019-07-29 ENCOUNTER — OUTPATIENT (OUTPATIENT)
Dept: OUTPATIENT SERVICES | Facility: HOSPITAL | Age: 64
LOS: 1 days | Discharge: HOME | End: 2019-07-29

## 2019-07-29 DIAGNOSIS — D69.3 IMMUNE THROMBOCYTOPENIC PURPURA: ICD-10-CM

## 2019-07-29 DIAGNOSIS — Z98.89 OTHER SPECIFIED POSTPROCEDURAL STATES: Chronic | ICD-10-CM

## 2019-08-15 ENCOUNTER — RESULT REVIEW (OUTPATIENT)
Age: 64
End: 2019-08-15

## 2019-08-15 ENCOUNTER — APPOINTMENT (OUTPATIENT)
Dept: HEMATOLOGY ONCOLOGY | Facility: CLINIC | Age: 64
End: 2019-08-15
Payer: MEDICARE

## 2019-08-15 VITALS
BODY MASS INDEX: 47.78 KG/M2 | SYSTOLIC BLOOD PRESSURE: 95 MMHG | RESPIRATION RATE: 16 BRPM | DIASTOLIC BLOOD PRESSURE: 66 MMHG | TEMPERATURE: 98.7 F | WEIGHT: 262.99 LBS | HEART RATE: 86 BPM | HEIGHT: 62.2 IN | OXYGEN SATURATION: 96 %

## 2019-08-15 DIAGNOSIS — I10 ESSENTIAL (PRIMARY) HYPERTENSION: ICD-10-CM

## 2019-08-15 DIAGNOSIS — E11.9 TYPE 2 DIABETES MELLITUS W/OUT COMPLICATIONS: ICD-10-CM

## 2019-08-15 DIAGNOSIS — E78.5 HYPERLIPIDEMIA, UNSPECIFIED: ICD-10-CM

## 2019-08-15 DIAGNOSIS — F17.200 NICOTINE DEPENDENCE, UNSPECIFIED, UNCOMPLICATED: ICD-10-CM

## 2019-08-15 DIAGNOSIS — Z80.3 FAMILY HISTORY OF MALIGNANT NEOPLASM OF BREAST: ICD-10-CM

## 2019-08-15 PROCEDURE — 99214 OFFICE O/P EST MOD 30 MIN: CPT

## 2019-08-15 RX ORDER — CEPHALEXIN 500 MG/1
500 CAPSULE ORAL
Qty: 14 | Refills: 0 | Status: COMPLETED | COMMUNITY
Start: 2019-07-10

## 2019-08-15 RX ORDER — PREDNISONE 10 MG/1
10 TABLET ORAL
Qty: 240 | Refills: 0 | Status: ACTIVE | COMMUNITY
Start: 2019-07-09

## 2019-08-15 RX ORDER — FUROSEMIDE 20 MG/1
20 TABLET ORAL
Qty: 30 | Refills: 0 | Status: ACTIVE | COMMUNITY
Start: 2019-06-11

## 2019-08-15 RX ORDER — INSULIN LISPRO 100 [IU]/ML
100 INJECTION, SOLUTION INTRAVENOUS; SUBCUTANEOUS
Qty: 15 | Refills: 0 | Status: ACTIVE | COMMUNITY
Start: 2019-03-14

## 2019-08-15 RX ORDER — SULFAMETHOXAZOLE AND TRIMETHOPRIM 800; 160 MG/1; MG/1
800-160 TABLET ORAL
Qty: 6 | Refills: 0 | Status: COMPLETED | COMMUNITY
Start: 2019-03-19

## 2019-08-15 RX ORDER — LANCETS 33 GAUGE
EACH MISCELLANEOUS
Qty: 100 | Refills: 0 | Status: ACTIVE | COMMUNITY
Start: 2019-02-19

## 2019-08-15 RX ORDER — BLOOD SUGAR DIAGNOSTIC
STRIP MISCELLANEOUS
Qty: 50 | Refills: 0 | Status: ACTIVE | COMMUNITY
Start: 2019-02-19

## 2019-08-15 RX ORDER — FENOFIBRATE 134 MG/1
134 CAPSULE ORAL
Refills: 0 | Status: ACTIVE | COMMUNITY
Start: 2019-08-15

## 2019-08-15 RX ORDER — GABAPENTIN 300 MG/1
300 CAPSULE ORAL
Qty: 90 | Refills: 0 | Status: ACTIVE | COMMUNITY
Start: 2019-02-22

## 2019-08-15 RX ORDER — PREDNISONE 20 MG/1
20 TABLET ORAL
Qty: 40 | Refills: 0 | Status: ACTIVE | COMMUNITY
Start: 2019-03-06

## 2019-08-20 LAB
BASOPHILS # BLD AUTO: 0 K/UL — SIGNIFICANT CHANGE UP (ref 0–0.2)
BASOPHILS NFR BLD AUTO: 0.3 % — SIGNIFICANT CHANGE UP (ref 0–2)
EOSINOPHIL # BLD AUTO: 0.3 K/UL — SIGNIFICANT CHANGE UP (ref 0–0.5)
EOSINOPHIL NFR BLD AUTO: 2.5 % — SIGNIFICANT CHANGE UP (ref 0–6)
HCT VFR BLD CALC: 35.7 % — SIGNIFICANT CHANGE UP (ref 34.5–45)
HGB BLD-MCNC: 11.6 G/DL — SIGNIFICANT CHANGE UP (ref 11.5–15.5)
LYMPHOCYTES # BLD AUTO: 0.9 K/UL — LOW (ref 1–3.3)
LYMPHOCYTES # BLD AUTO: 8.2 % — LOW (ref 13–44)
MCHC RBC-ENTMCNC: 27.2 PG — SIGNIFICANT CHANGE UP (ref 27–34)
MCHC RBC-ENTMCNC: 32.4 G/DL — SIGNIFICANT CHANGE UP (ref 32–36)
MCV RBC AUTO: 84 FL — SIGNIFICANT CHANGE UP (ref 80–100)
MONOCYTES # BLD AUTO: 0.3 K/UL — SIGNIFICANT CHANGE UP (ref 0–0.9)
MONOCYTES NFR BLD AUTO: 3.1 % — SIGNIFICANT CHANGE UP (ref 2–14)
NEUTROPHILS # BLD AUTO: 9.5 K/UL — HIGH (ref 1.8–7.4)
NEUTROPHILS NFR BLD AUTO: 85.9 % — HIGH (ref 43–77)
PLATELET # BLD AUTO: 23 K/UL — LOW (ref 150–400)
RBC # BLD: 4.25 M/UL — SIGNIFICANT CHANGE UP (ref 3.8–5.2)
RBC # FLD: 15.7 % — HIGH (ref 10.3–14.5)
WBC # BLD: 11.1 K/UL — HIGH (ref 3.8–10.5)
WBC # FLD AUTO: 11.1 K/UL — HIGH (ref 3.8–10.5)

## 2019-08-26 NOTE — CONSULT LETTER
[Dear  ___] : Dear  [unfilled], [Consult Letter:] : I had the pleasure of evaluating your patient, [unfilled]. [Please see my note below.] : Please see my note below. [FreeTextEntry3] : Samantha Duran MD [Sincerely,] : Sincerely,

## 2019-08-26 NOTE — HISTORY OF PRESENT ILLNESS
[de-identified] : 64 year old female  with a history of HTN, HLD, nephrolithiasis, hypothyroid, DM,and recurrent breast lumps followed by Dr Soria for a diagnosis of ITP in february who is here to see me for ITP. \par \par Per pt, she was getting routine bloodwork prior to starting a new form of Tamoxifen for chemoprevention when Dr. Soria told her to go to ER because her platelets were low. Pt endorses that she bruises easily but that this sx is not new and has been going on for years. She reports normally getting bloodwork about every 3 months and has never been told in the past that she had low platelets. She was recently started on Dydureon a couple of months ago for her DM2 but has not had any other changes to her medications. She has no family history of blood issues. Denies fever, chills, CP, palpitations, abd pain, N/V/D/C, LE swelling.\par \par She was on 100mg of prednisone on discharge from the hospital, she never went above 59K. Now down to 5/10mg\par She received rituximab x 8 doses- completed 2 weeks ago.  Platelets has not recovered as yet. \par \par She has never had a bone marrow biopsy.\par February was the first time she had low platelets.  \par She went for a follow up after breast biopsy\par \par She has been bruising with the low platelets. No vaginal bleeding, no gum bleeding. \par -up to date with colonoscopy, it is the 5 year sabina, last done 5 years ago and was ok\par -also has started promacta as of last week, she reveals that she has a bone marrow scheduled for Monday by her primary oncologist, she is here to see if any options of what to do.

## 2019-08-26 NOTE — ASSESSMENT
[FreeTextEntry1] : This is a 64 year old female who is here to see me for her diagnosis of ITP, her platelets have responded to prednisone but did not have a robust response (highest number was in the 50s) and now with tapering further her platelets are dropping. She has received rituximab x 8, completed 2 weeks ago, she has started promacta and is scheduled for bmbx on Monday.\par -MRI of abdomen fatty liver and normal spleen- story still points mostly to ITP, but would make sure to rule out other causes, if not CT scan to ensure no LAD as yet, would obtain\par -agree with starting promacta, sometimes the repsponse to Rituximab can be delayed but the best responses often happen sooner than 2 months after treatment\par -agree with obtaining bone marrow biopsy to ensure no other disease/issue we are not identifying\par -return visit with any questions or concerns.

## 2019-08-26 NOTE — PHYSICAL EXAM
[Fully active, able to carry on all pre-disease performance without restriction] : Status 0 - Fully active, able to carry on all pre-disease performance without restriction [Normal] : affect appropriate [de-identified] : mild bruising on her hands

## 2019-11-20 ENCOUNTER — OUTPATIENT (OUTPATIENT)
Dept: OUTPATIENT SERVICES | Facility: HOSPITAL | Age: 64
LOS: 1 days | Discharge: HOME | End: 2019-11-20

## 2019-11-20 DIAGNOSIS — Z98.89 OTHER SPECIFIED POSTPROCEDURAL STATES: Chronic | ICD-10-CM

## 2019-11-20 DIAGNOSIS — D69.3 IMMUNE THROMBOCYTOPENIC PURPURA: ICD-10-CM

## 2019-11-26 ENCOUNTER — RESULT REVIEW (OUTPATIENT)
Age: 64
End: 2019-11-26

## 2019-11-26 ENCOUNTER — APPOINTMENT (OUTPATIENT)
Dept: HEMATOLOGY ONCOLOGY | Facility: CLINIC | Age: 64
End: 2019-11-26
Payer: MEDICARE

## 2019-11-26 VITALS
HEART RATE: 103 BPM | SYSTOLIC BLOOD PRESSURE: 130 MMHG | OXYGEN SATURATION: 96 % | BODY MASS INDEX: 45.87 KG/M2 | TEMPERATURE: 98.6 F | DIASTOLIC BLOOD PRESSURE: 78 MMHG | WEIGHT: 252.43 LBS | RESPIRATION RATE: 17 BRPM

## 2019-11-26 DIAGNOSIS — D69.3 IMMUNE THROMBOCYTOPENIC PURPURA: ICD-10-CM

## 2019-11-26 LAB
BASOPHILS # BLD AUTO: 0 K/UL — SIGNIFICANT CHANGE UP (ref 0–0.2)
BASOPHILS NFR BLD AUTO: 0.1 % — SIGNIFICANT CHANGE UP (ref 0–2)
EOSINOPHIL # BLD AUTO: 0.2 K/UL — SIGNIFICANT CHANGE UP (ref 0–0.5)
EOSINOPHIL NFR BLD AUTO: 0.9 % — SIGNIFICANT CHANGE UP (ref 0–6)
HCT VFR BLD CALC: 36.8 % — SIGNIFICANT CHANGE UP (ref 34.5–45)
HGB BLD-MCNC: 11.8 G/DL — SIGNIFICANT CHANGE UP (ref 11.5–15.5)
LYMPHOCYTES # BLD AUTO: 1.7 K/UL — SIGNIFICANT CHANGE UP (ref 1–3.3)
LYMPHOCYTES # BLD AUTO: 8.6 % — LOW (ref 13–44)
MCHC RBC-ENTMCNC: 25.5 PG — LOW (ref 27–34)
MCHC RBC-ENTMCNC: 32 G/DL — SIGNIFICANT CHANGE UP (ref 32–36)
MCV RBC AUTO: 79.9 FL — LOW (ref 80–100)
MONOCYTES # BLD AUTO: 0.8 K/UL — SIGNIFICANT CHANGE UP (ref 0–0.9)
MONOCYTES NFR BLD AUTO: 4.3 % — SIGNIFICANT CHANGE UP (ref 2–14)
NEUTROPHILS # BLD AUTO: 16.7 K/UL — HIGH (ref 1.8–7.4)
NEUTROPHILS NFR BLD AUTO: 86.1 % — HIGH (ref 43–77)
PLATELET # BLD AUTO: 76 K/UL — LOW (ref 150–400)
RBC # BLD: 4.61 M/UL — SIGNIFICANT CHANGE UP (ref 3.8–5.2)
RBC # FLD: 16.9 % — HIGH (ref 10.3–14.5)
WBC # BLD: 19.4 K/UL — HIGH (ref 3.8–10.5)
WBC # FLD AUTO: 19.4 K/UL — HIGH (ref 3.8–10.5)

## 2019-11-26 PROCEDURE — 99214 OFFICE O/P EST MOD 30 MIN: CPT

## 2019-11-26 RX ORDER — FOSTAMATINIB 100 MG/1
100 TABLET ORAL
Qty: 60 | Refills: 2 | Status: ACTIVE | COMMUNITY
Start: 2019-11-26 | End: 1900-01-01

## 2019-11-26 NOTE — ASSESSMENT
[FreeTextEntry1] : This is a 64 year old female who is here to see me for her diagnosis of ITP, her platelets have responded to prednisone but did not have a robust response (highest number was in the 50s) and now with tapering further her platelets are dropping. She has received rituximab x 8 without response and is now on promacta with signs of relapse\par -MRI of abdomen fatty liver and normal spleen- story still points mostly to ITP, \par -will consider trial of tavalisse- rx written try to obtain\par -once drug obtained will reach out to Dr Soria and determing further plan of care. \par -can try romiplostim in the mean time if any delay in obtaining tavalisse

## 2019-11-26 NOTE — HISTORY OF PRESENT ILLNESS
[Disease:__________________________] : Disease: [unfilled] [de-identified] : 64 year old female  with a history of HTN, HLD, nephrolithiasis, hypothyroid, DM,and recurrent breast lumps followed by Dr Soria for a diagnosis of ITP in february who is here to see me for ITP. \par \par Per pt, she was getting routine bloodwork prior to starting a new form of Tamoxifen for chemoprevention when Dr. Soria told her to go to ER because her platelets were low. Pt endorses that she bruises easily but that this sx is not new and has been going on for years. She reports normally getting bloodwork about every 3 months and has never been told in the past that she had low platelets. She was recently started on Dydureon a couple of months ago for her DM2 but has not had any other changes to her medications. She has no family history of blood issues. Denies fever, chills, CP, palpitations, abd pain, N/V/D/C, LE swelling.\par \par She was on 100mg of prednisone on discharge from the hospital, she never went above 59K. Now down to 5/10mg\par She received rituximab x 8 doses- completed 2 weeks ago.  Platelets has not recovered as yet. \par \par She has never had a bone marrow biopsy.\par February was the first time she had low platelets.  \par She went for a follow up after breast biopsy\par \par She has been bruising with the low platelets. No vaginal bleeding, no gum bleeding. \par -up to date with colonoscopy, it is the 5 year sabina, last done 5 years ago and was ok\par -also has started promacta and about a month ago in late october her platelet count started falling despite maximum dose of promacta,\par \par  [de-identified] : -she was started back on steroids and is now in prednisone 30/40 alternating days with falling platelet count \par -she had a bone marrow biopsy that reportedly did not show anything\par -she is trying to obtain romiplostim\par -no bleeding or bruising\par -considering splenectomy, seeing surgeon next week

## 2020-01-26 ENCOUNTER — OUTPATIENT (OUTPATIENT)
Dept: OUTPATIENT SERVICES | Facility: HOSPITAL | Age: 65
LOS: 1 days | End: 2020-01-26
Payer: MEDICARE

## 2020-01-26 ENCOUNTER — APPOINTMENT (OUTPATIENT)
Dept: MRI IMAGING | Facility: IMAGING CENTER | Age: 65
End: 2020-01-26
Payer: MEDICARE

## 2020-01-26 DIAGNOSIS — Z98.89 OTHER SPECIFIED POSTPROCEDURAL STATES: Chronic | ICD-10-CM

## 2020-01-26 DIAGNOSIS — Z00.8 ENCOUNTER FOR OTHER GENERAL EXAMINATION: ICD-10-CM

## 2020-01-26 PROCEDURE — 72148 MRI LUMBAR SPINE W/O DYE: CPT

## 2020-01-26 PROCEDURE — 72148 MRI LUMBAR SPINE W/O DYE: CPT | Mod: 26

## 2020-01-26 PROCEDURE — 72146 MRI CHEST SPINE W/O DYE: CPT

## 2020-01-26 PROCEDURE — 72146 MRI CHEST SPINE W/O DYE: CPT | Mod: 26

## 2020-02-10 ENCOUNTER — OUTPATIENT (OUTPATIENT)
Dept: OUTPATIENT SERVICES | Facility: HOSPITAL | Age: 65
LOS: 1 days | End: 2020-02-10
Payer: MEDICARE

## 2020-02-10 ENCOUNTER — APPOINTMENT (OUTPATIENT)
Dept: CT IMAGING | Facility: CLINIC | Age: 65
End: 2020-02-10
Payer: MEDICARE

## 2020-02-10 DIAGNOSIS — Z98.89 OTHER SPECIFIED POSTPROCEDURAL STATES: Chronic | ICD-10-CM

## 2020-02-10 DIAGNOSIS — Z00.8 ENCOUNTER FOR OTHER GENERAL EXAMINATION: ICD-10-CM

## 2020-02-10 PROCEDURE — 72128 CT CHEST SPINE W/O DYE: CPT

## 2020-02-10 PROCEDURE — 72128 CT CHEST SPINE W/O DYE: CPT | Mod: 26

## 2020-03-31 ENCOUNTER — OUTPATIENT (OUTPATIENT)
Dept: OUTPATIENT SERVICES | Facility: HOSPITAL | Age: 65
LOS: 1 days | Discharge: HOME | End: 2020-03-31

## 2020-03-31 DIAGNOSIS — Z98.89 OTHER SPECIFIED POSTPROCEDURAL STATES: Chronic | ICD-10-CM

## 2020-03-31 DIAGNOSIS — D69.3 IMMUNE THROMBOCYTOPENIC PURPURA: ICD-10-CM

## 2020-04-07 ENCOUNTER — APPOINTMENT (OUTPATIENT)
Dept: HEMATOLOGY ONCOLOGY | Facility: CLINIC | Age: 65
End: 2020-04-07

## 2020-06-11 ENCOUNTER — OUTPATIENT (OUTPATIENT)
Dept: OUTPATIENT SERVICES | Facility: HOSPITAL | Age: 65
LOS: 1 days | End: 2020-06-11
Payer: MEDICARE

## 2020-06-11 VITALS
HEIGHT: 62 IN | HEART RATE: 77 BPM | WEIGHT: 240.08 LBS | OXYGEN SATURATION: 97 % | DIASTOLIC BLOOD PRESSURE: 83 MMHG | SYSTOLIC BLOOD PRESSURE: 130 MMHG | RESPIRATION RATE: 16 BRPM | TEMPERATURE: 99 F

## 2020-06-11 DIAGNOSIS — E66.9 OBESITY, UNSPECIFIED: ICD-10-CM

## 2020-06-11 DIAGNOSIS — D69.3 IMMUNE THROMBOCYTOPENIC PURPURA: ICD-10-CM

## 2020-06-11 DIAGNOSIS — Z98.89 OTHER SPECIFIED POSTPROCEDURAL STATES: Chronic | ICD-10-CM

## 2020-06-11 DIAGNOSIS — Z98.890 OTHER SPECIFIED POSTPROCEDURAL STATES: Chronic | ICD-10-CM

## 2020-06-11 DIAGNOSIS — Z90.49 ACQUIRED ABSENCE OF OTHER SPECIFIED PARTS OF DIGESTIVE TRACT: Chronic | ICD-10-CM

## 2020-06-11 DIAGNOSIS — S22.000A WEDGE COMPRESSION FRACTURE OF UNSPECIFIED THORACIC VERTEBRA, INITIAL ENCOUNTER FOR CLOSED FRACTURE: ICD-10-CM

## 2020-06-11 DIAGNOSIS — S22.000S WEDGE COMPRESSION FRACTURE OF UNSPECIFIED THORACIC VERTEBRA, SEQUELA: ICD-10-CM

## 2020-06-11 DIAGNOSIS — Z01.818 ENCOUNTER FOR OTHER PREPROCEDURAL EXAMINATION: ICD-10-CM

## 2020-06-11 LAB
A1C WITH ESTIMATED AVERAGE GLUCOSE RESULT: 7.3 % — HIGH (ref 4–5.6)
ALBUMIN SERPL ELPH-MCNC: 4.3 G/DL — SIGNIFICANT CHANGE UP (ref 3.3–5)
ALP SERPL-CCNC: 46 U/L — SIGNIFICANT CHANGE UP (ref 40–120)
ALT FLD-CCNC: 12 U/L — SIGNIFICANT CHANGE UP (ref 10–45)
ANION GAP SERPL CALC-SCNC: 13 MMOL/L — SIGNIFICANT CHANGE UP (ref 5–17)
APTT BLD: 28 SEC — SIGNIFICANT CHANGE UP (ref 27.5–36.3)
AST SERPL-CCNC: 18 U/L — SIGNIFICANT CHANGE UP (ref 10–40)
BILIRUB SERPL-MCNC: 0.2 MG/DL — SIGNIFICANT CHANGE UP (ref 0.2–1.2)
BUN SERPL-MCNC: 25 MG/DL — HIGH (ref 7–23)
CALCIUM SERPL-MCNC: 9 MG/DL — SIGNIFICANT CHANGE UP (ref 8.4–10.5)
CHLORIDE SERPL-SCNC: 105 MMOL/L — SIGNIFICANT CHANGE UP (ref 96–108)
CO2 SERPL-SCNC: 20 MMOL/L — LOW (ref 22–31)
CREAT SERPL-MCNC: 1.3 MG/DL — SIGNIFICANT CHANGE UP (ref 0.5–1.3)
ESTIMATED AVERAGE GLUCOSE: 163 MG/DL — HIGH (ref 68–114)
GLUCOSE SERPL-MCNC: 232 MG/DL — HIGH (ref 70–99)
HCT VFR BLD CALC: 29.2 % — LOW (ref 34.5–45)
HGB BLD-MCNC: 8.4 G/DL — LOW (ref 11.5–15.5)
INR BLD: 0.98 RATIO — SIGNIFICANT CHANGE UP (ref 0.88–1.16)
MCHC RBC-ENTMCNC: 22.3 PG — LOW (ref 27–34)
MCHC RBC-ENTMCNC: 28.8 GM/DL — LOW (ref 32–36)
MCV RBC AUTO: 77.5 FL — LOW (ref 80–100)
NRBC # BLD: 0 /100 WBCS — SIGNIFICANT CHANGE UP (ref 0–0)
PLATELET # BLD AUTO: 314 K/UL — SIGNIFICANT CHANGE UP (ref 150–400)
POTASSIUM SERPL-MCNC: 4 MMOL/L — SIGNIFICANT CHANGE UP (ref 3.5–5.3)
POTASSIUM SERPL-SCNC: 4 MMOL/L — SIGNIFICANT CHANGE UP (ref 3.5–5.3)
PROT SERPL-MCNC: 6.3 G/DL — SIGNIFICANT CHANGE UP (ref 6–8.3)
PROTHROM AB SERPL-ACNC: 11.1 SEC — SIGNIFICANT CHANGE UP (ref 10–13.1)
RBC # BLD: 3.77 M/UL — LOW (ref 3.8–5.2)
RBC # FLD: 18.8 % — HIGH (ref 10.3–14.5)
SODIUM SERPL-SCNC: 138 MMOL/L — SIGNIFICANT CHANGE UP (ref 135–145)
WBC # BLD: 11.9 K/UL — HIGH (ref 3.8–10.5)
WBC # FLD AUTO: 11.9 K/UL — HIGH (ref 3.8–10.5)

## 2020-06-11 PROCEDURE — 85027 COMPLETE CBC AUTOMATED: CPT

## 2020-06-11 PROCEDURE — G0463: CPT

## 2020-06-11 PROCEDURE — 85610 PROTHROMBIN TIME: CPT

## 2020-06-11 PROCEDURE — 85730 THROMBOPLASTIN TIME PARTIAL: CPT

## 2020-06-11 PROCEDURE — 83036 HEMOGLOBIN GLYCOSYLATED A1C: CPT

## 2020-06-11 PROCEDURE — 80053 COMPREHEN METABOLIC PANEL: CPT

## 2020-06-11 RX ORDER — ASPIRIN/CALCIUM CARB/MAGNESIUM 324 MG
1 TABLET ORAL
Qty: 0 | Refills: 0 | DISCHARGE

## 2020-06-11 RX ORDER — EXENATIDE 250 UG/ML
0 INJECTION SUBCUTANEOUS
Qty: 0 | Refills: 0 | DISCHARGE

## 2020-06-11 RX ORDER — SOLIFENACIN SUCCINATE 10 MG/1
1 TABLET ORAL
Qty: 0 | Refills: 0 | DISCHARGE

## 2020-06-11 RX ORDER — EZETIMIBE 10 MG/1
1 TABLET ORAL
Qty: 0 | Refills: 0 | DISCHARGE

## 2020-06-11 RX ORDER — CEFAZOLIN SODIUM 1 G
2000 VIAL (EA) INJECTION ONCE
Refills: 0 | Status: DISCONTINUED | OUTPATIENT
Start: 2020-06-18 | End: 2020-06-22

## 2020-06-11 RX ORDER — LANOLIN ALCOHOL/MO/W.PET/CERES
1 CREAM (GRAM) TOPICAL
Qty: 0 | Refills: 0 | DISCHARGE

## 2020-06-11 RX ORDER — PREGABALIN 225 MG/1
0 CAPSULE ORAL
Qty: 0 | Refills: 0 | DISCHARGE

## 2020-06-11 RX ORDER — ROSUVASTATIN CALCIUM 5 MG/1
1 TABLET ORAL
Qty: 0 | Refills: 0 | DISCHARGE

## 2020-06-11 RX ORDER — FOLIC ACID 0.8 MG
1 TABLET ORAL
Qty: 0 | Refills: 0 | DISCHARGE

## 2020-06-11 RX ORDER — CARVEDILOL PHOSPHATE 80 MG/1
1 CAPSULE, EXTENDED RELEASE ORAL
Qty: 0 | Refills: 0 | DISCHARGE

## 2020-06-11 NOTE — H&P PST ADULT - HISTORY OF PRESENT ILLNESS
Patient is a 65 y/o female h/o HTN, GERD, HLD, DM2, ITP diagnosed in 02/2019 on Prednisone and Promecta with platelets greatly fluctuating all the time lowest being 14 and highest in 400s  (sees hematologist weekly with adjustment of meds), who was diagnosed with T4, T5, T6 compression fractures, probably due to use of steroids. Patient is a 63 y/o female with h/o HTN, obesity, GERD, HLD, DM2, ITP diagnosed in 02/2019 on Prednisone and Promacta with platelets greatly fluctuating all the time, lowest being 14 and highest in 400s  (sees hematologist weekly with adjustment of meds), who was diagnosed with T4, T5, T6 compression fractures, probably due to use of steroids. She is scheduled for T4, T5, T6 Kyphoplasty.

## 2020-06-11 NOTE — H&P PST ADULT - NSICDXPASTMEDICALHX_GEN_ALL_CORE_FT
PAST MEDICAL HISTORY:  Breast lump right breast    Cholesterol Serum Elevated (ICD9 272.9)     Chronic ITP (idiopathic thrombocytopenia) diagnosed 02/2019, lowest platelets 14, on Prednisone and Promacta, platelets levels fluctuate, Pt. sees hematologist weekly, doses adjusted weekly    Diabetes Mellitus Type 2 in Obese (ICD9 250.00)     Gall Stones (ICD9 574.20)     GERD (gastroesophageal reflux disease)     H/O: Hypertension (ICD9 V12.59)     H/O: Hypothyroidism (ICD9 V12.2)     Hepatomegaly US and CT 02/2019 with possible cirrhosis    Morbid Obesity (ICD9 278.01)     Uterine polyp

## 2020-06-11 NOTE — H&P PST ADULT - NSICDXPASTSURGICALHX_GEN_ALL_CORE_FT
PAST SURGICAL HISTORY:  Abscess of Appendix (ICD9 540.1) S/P appendectomy, 1990    C Section (ICD9 669.70)     H/O cystoscopy ureteroscopy with Right ureteral stent, 11/24/2009    H/O: Hysterectomy (ICD9 V88.01) - supracervical, with BSO, Exploratory Laparotomy, 10/17/08    Heel Spur (ICD9 726.73) bilateral heel spurs  repaired    History of Breast Biopsy (ICD9 V45.89) x3 left breast benign    History of cholecystectomy     History of D&C with hysteroscopy, polypectomy, 08/01/08    S/P Carpal Tunnel Release (ICD9 V45.89) left arm    S/P laparoscopic cholecystectomy 02/17/2012

## 2020-06-11 NOTE — H&P PST ADULT - NEUROLOGICAL DETAILS
Above noted.  Agree with the assessment and recommendations. Above noted. Agree with the planned treatment. Patient seen and examined with the GI fellow. I agree with the above assessment and plan. Thank you for allowing us to care for your patient.    Plan for EGD after cardiac clearance. Seen on dialysis. tolerating well. for EGD today # ESRD  - Seen during HD. Tolerating well.  - Continue HD as scheduled, MWF    # Electrolyte derangements  - No critical electrolyte derangements    # Volume  - No critical volume overload  - UF goal: as tolerated. Goal 2kg    # HTN  - BP within acceptable range    # CKD MBD/ Secondary Hyperparathyroidism  - Phos and Ca WNL    # Anemia of CKD  - Continue DIVYA    # Nutrition  - Consider renal multivitamins for Vitamin B and C supplementation  - Low Phos diet  - Low K diet  - Na restriction AP>>  # ESRD  - Seen during HD. Tolerating well.  - BP during visit 105/104  - Continue HD as scheduled    # Electrolyte derangements  - No critical electrolyte derangements  - K mildly low    # Volume  - No critical volume overload  - UF goal: 2kg as tolerated    # HTN  - BP within acceptable range    # Access    # CKD MBD/ Secondary Hyperparathyroidism  - Phos and Ca WNL    # Anemia of CKD  - Hgb low  - Continue DIVYA    # Nutrition  - Consider renal multivitamins for Vitamin B and C supplementation  - Low Phos diet  - Low K diet  - Na restriction      Thank you for allowing us to participate in your patient's care. We will continue to follow the patient with you. Please call/ text: [phone] today for any q's or c's.    Rich Elmore  Nephrology Hemoglobin stable. EGD with healed ulcer, gastritis. Continue PPI. Needs f/u with GI and also PCP for repeat imaging of his splenic masses in 3 months. Patient is requesting to change his PCP to Gayathri.    Patient has reported severe abdominal pain while hospitalized - has been using IV Dilaudid 0.4 mg as needed for pain control. Discussed we can prescribed him a small amount of oral Dilaudid on discharge for pain control, but for additional prescriptions, he will need to obtain from PCP.    Patient needs to f/u with ID for management of HIV.    40 min discharge time. Plan for HD in AM, followed by EGD tomorrow Plan for EGD Monday Stool visualized in colostomy bag - light brown. Patient denies any other symptoms. Denies chest pain, abdominal pain. Awaiting EGD today. alert and oriented x 3

## 2020-06-15 ENCOUNTER — OUTPATIENT (OUTPATIENT)
Dept: OUTPATIENT SERVICES | Facility: HOSPITAL | Age: 65
LOS: 1 days | End: 2020-06-15

## 2020-06-15 DIAGNOSIS — Z11.59 ENCOUNTER FOR SCREENING FOR OTHER VIRAL DISEASES: ICD-10-CM

## 2020-06-15 DIAGNOSIS — Z98.89 OTHER SPECIFIED POSTPROCEDURAL STATES: Chronic | ICD-10-CM

## 2020-06-15 DIAGNOSIS — Z98.890 OTHER SPECIFIED POSTPROCEDURAL STATES: Chronic | ICD-10-CM

## 2020-06-15 DIAGNOSIS — Z90.49 ACQUIRED ABSENCE OF OTHER SPECIFIED PARTS OF DIGESTIVE TRACT: Chronic | ICD-10-CM

## 2020-06-15 PROBLEM — D69.3 IMMUNE THROMBOCYTOPENIC PURPURA: Chronic | Status: ACTIVE | Noted: 2020-06-11

## 2020-06-15 PROBLEM — N84.0 POLYP OF CORPUS UTERI: Chronic | Status: ACTIVE | Noted: 2020-06-11

## 2020-06-15 LAB — SARS-COV-2 RNA SPEC QL NAA+PROBE: SIGNIFICANT CHANGE UP

## 2020-06-17 ENCOUNTER — TRANSCRIPTION ENCOUNTER (OUTPATIENT)
Age: 65
End: 2020-06-17

## 2020-06-18 ENCOUNTER — INPATIENT (INPATIENT)
Facility: HOSPITAL | Age: 65
LOS: 3 days | Discharge: HOME | DRG: 543 | End: 2020-06-22
Attending: NEUROLOGICAL SURGERY | Admitting: NEUROLOGICAL SURGERY
Payer: MEDICARE

## 2020-06-18 VITALS
RESPIRATION RATE: 18 BRPM | WEIGHT: 240.08 LBS | TEMPERATURE: 99 F | HEART RATE: 75 BPM | SYSTOLIC BLOOD PRESSURE: 128 MMHG | HEIGHT: 62 IN | OXYGEN SATURATION: 97 % | DIASTOLIC BLOOD PRESSURE: 78 MMHG

## 2020-06-18 DIAGNOSIS — Z98.89 OTHER SPECIFIED POSTPROCEDURAL STATES: Chronic | ICD-10-CM

## 2020-06-18 DIAGNOSIS — Z90.49 ACQUIRED ABSENCE OF OTHER SPECIFIED PARTS OF DIGESTIVE TRACT: Chronic | ICD-10-CM

## 2020-06-18 DIAGNOSIS — Z98.890 OTHER SPECIFIED POSTPROCEDURAL STATES: Chronic | ICD-10-CM

## 2020-06-18 DIAGNOSIS — S22.000S WEDGE COMPRESSION FRACTURE OF UNSPECIFIED THORACIC VERTEBRA, SEQUELA: ICD-10-CM

## 2020-06-18 LAB
ANION GAP SERPL CALC-SCNC: 12 MMOL/L — SIGNIFICANT CHANGE UP (ref 5–17)
ANISOCYTOSIS BLD QL: SIGNIFICANT CHANGE UP
BASOPHILS # BLD AUTO: 0 K/UL — SIGNIFICANT CHANGE UP (ref 0–0.2)
BASOPHILS NFR BLD AUTO: 0 % — SIGNIFICANT CHANGE UP (ref 0–2)
BLD GP AB SCN SERPL QL: NEGATIVE — SIGNIFICANT CHANGE UP
BUN SERPL-MCNC: 17 MG/DL — SIGNIFICANT CHANGE UP (ref 7–23)
CALCIUM SERPL-MCNC: 7.7 MG/DL — LOW (ref 8.4–10.5)
CHLORIDE SERPL-SCNC: 108 MMOL/L — SIGNIFICANT CHANGE UP (ref 96–108)
CO2 SERPL-SCNC: 20 MMOL/L — LOW (ref 22–31)
CREAT SERPL-MCNC: 1.21 MG/DL — SIGNIFICANT CHANGE UP (ref 0.5–1.3)
DACRYOCYTES BLD QL SMEAR: SLIGHT — SIGNIFICANT CHANGE UP
ELLIPTOCYTES BLD QL SMEAR: SLIGHT — SIGNIFICANT CHANGE UP
EOSINOPHIL # BLD AUTO: 0.1 K/UL — SIGNIFICANT CHANGE UP (ref 0–0.5)
EOSINOPHIL NFR BLD AUTO: 1 % — SIGNIFICANT CHANGE UP (ref 0–6)
GAS PNL BLDA: SIGNIFICANT CHANGE UP
GLUCOSE BLDC GLUCOMTR-MCNC: 143 MG/DL — HIGH (ref 70–99)
GLUCOSE BLDC GLUCOMTR-MCNC: 214 MG/DL — HIGH (ref 70–99)
GLUCOSE BLDC GLUCOMTR-MCNC: 222 MG/DL — HIGH (ref 70–99)
GLUCOSE SERPL-MCNC: 239 MG/DL — HIGH (ref 70–99)
HCT VFR BLD CALC: 26 % — LOW (ref 34.5–45)
HCT VFR BLD CALC: 29.5 % — LOW (ref 34.5–45)
HCT VFR BLD CALC: 30.1 % — LOW (ref 34.5–45)
HGB BLD-MCNC: 7.6 G/DL — LOW (ref 11.5–15.5)
HGB BLD-MCNC: 8.6 G/DL — LOW (ref 11.5–15.5)
HGB BLD-MCNC: 8.7 G/DL — LOW (ref 11.5–15.5)
HYPOCHROMIA BLD QL: SLIGHT — SIGNIFICANT CHANGE UP
LG PLATELETS BLD QL AUTO: SLIGHT — SIGNIFICANT CHANGE UP
LYMPHOCYTES # BLD AUTO: 0.2 K/UL — LOW (ref 1–3.3)
LYMPHOCYTES # BLD AUTO: 2 % — LOW (ref 13–44)
MANUAL SMEAR VERIFICATION: SIGNIFICANT CHANGE UP
MCHC RBC-ENTMCNC: 22.6 PG — LOW (ref 27–34)
MCHC RBC-ENTMCNC: 23.8 PG — LOW (ref 27–34)
MCHC RBC-ENTMCNC: 24.5 PG — LOW (ref 27–34)
MCHC RBC-ENTMCNC: 28.6 GM/DL — LOW (ref 32–36)
MCHC RBC-ENTMCNC: 29.2 GM/DL — LOW (ref 32–36)
MCHC RBC-ENTMCNC: 29.5 GM/DL — LOW (ref 32–36)
MCV RBC AUTO: 79 FL — LOW (ref 80–100)
MCV RBC AUTO: 81.3 FL — SIGNIFICANT CHANGE UP (ref 80–100)
MCV RBC AUTO: 83.1 FL — SIGNIFICANT CHANGE UP (ref 80–100)
METAMYELOCYTES # FLD: 1 % — HIGH (ref 0–0)
MICROCYTES BLD QL: SLIGHT — SIGNIFICANT CHANGE UP
MONOCYTES # BLD AUTO: 0.3 K/UL — SIGNIFICANT CHANGE UP (ref 0–0.9)
MONOCYTES NFR BLD AUTO: 3 % — SIGNIFICANT CHANGE UP (ref 2–14)
NEUTROPHILS # BLD AUTO: 9.18 K/UL — HIGH (ref 1.8–7.4)
NEUTROPHILS NFR BLD AUTO: 92 % — HIGH (ref 43–77)
NRBC # BLD: 0 /100 WBCS — SIGNIFICANT CHANGE UP (ref 0–0)
NRBC # BLD: 0 /100 WBCS — SIGNIFICANT CHANGE UP (ref 0–0)
NRBC # BLD: 0 /100 — SIGNIFICANT CHANGE UP (ref 0–0)
PLAT MORPH BLD: ABNORMAL
PLATELET # BLD AUTO: 101 K/UL — LOW (ref 150–400)
PLATELET # BLD AUTO: 67 K/UL — LOW (ref 150–400)
PLATELET # BLD AUTO: 72 K/UL — LOW (ref 150–400)
POIKILOCYTOSIS BLD QL AUTO: SLIGHT — SIGNIFICANT CHANGE UP
POLYCHROMASIA BLD QL SMEAR: SLIGHT — SIGNIFICANT CHANGE UP
POTASSIUM SERPL-MCNC: 3.9 MMOL/L — SIGNIFICANT CHANGE UP (ref 3.5–5.3)
POTASSIUM SERPL-SCNC: 3.9 MMOL/L — SIGNIFICANT CHANGE UP (ref 3.5–5.3)
RBC # BLD: 3.2 M/UL — LOW (ref 3.8–5.2)
RBC # BLD: 3.55 M/UL — LOW (ref 3.8–5.2)
RBC # BLD: 3.81 M/UL — SIGNIFICANT CHANGE UP (ref 3.8–5.2)
RBC # FLD: 18.3 % — HIGH (ref 10.3–14.5)
RBC # FLD: 18.8 % — HIGH (ref 10.3–14.5)
RBC # FLD: 19.2 % — HIGH (ref 10.3–14.5)
RBC BLD AUTO: ABNORMAL
RH IG SCN BLD-IMP: POSITIVE — SIGNIFICANT CHANGE UP
RH IG SCN BLD-IMP: POSITIVE — SIGNIFICANT CHANGE UP
SODIUM SERPL-SCNC: 140 MMOL/L — SIGNIFICANT CHANGE UP (ref 135–145)
VARIANT LYMPHS # BLD: 1 % — SIGNIFICANT CHANGE UP (ref 0–6)
WBC # BLD: 10.54 K/UL — HIGH (ref 3.8–10.5)
WBC # BLD: 13.36 K/UL — HIGH (ref 3.8–10.5)
WBC # BLD: 9.98 K/UL — SIGNIFICANT CHANGE UP (ref 3.8–10.5)
WBC # FLD AUTO: 10.54 K/UL — HIGH (ref 3.8–10.5)
WBC # FLD AUTO: 13.36 K/UL — HIGH (ref 3.8–10.5)
WBC # FLD AUTO: 9.98 K/UL — SIGNIFICANT CHANGE UP (ref 3.8–10.5)

## 2020-06-18 RX ORDER — DEXTROSE 50 % IN WATER 50 %
12.5 SYRINGE (ML) INTRAVENOUS ONCE
Refills: 0 | Status: DISCONTINUED | OUTPATIENT
Start: 2020-06-18 | End: 2020-06-22

## 2020-06-18 RX ORDER — FENOFIBRATE,MICRONIZED 130 MG
145 CAPSULE ORAL DAILY
Refills: 0 | Status: DISCONTINUED | OUTPATIENT
Start: 2020-06-18 | End: 2020-06-22

## 2020-06-18 RX ORDER — CHLORHEXIDINE GLUCONATE 213 G/1000ML
1 SOLUTION TOPICAL ONCE
Refills: 0 | Status: DISCONTINUED | OUTPATIENT
Start: 2020-06-18 | End: 2020-06-18

## 2020-06-18 RX ORDER — HYDROMORPHONE HYDROCHLORIDE 2 MG/ML
0.5 INJECTION INTRAMUSCULAR; INTRAVENOUS; SUBCUTANEOUS
Refills: 0 | Status: DISCONTINUED | OUTPATIENT
Start: 2020-06-18 | End: 2020-06-18

## 2020-06-18 RX ORDER — LIDOCAINE HCL 20 MG/ML
0.2 VIAL (ML) INJECTION ONCE
Refills: 0 | Status: DISCONTINUED | OUTPATIENT
Start: 2020-06-18 | End: 2020-06-18

## 2020-06-18 RX ORDER — ONDANSETRON 8 MG/1
4 TABLET, FILM COATED ORAL
Refills: 0 | Status: DISCONTINUED | OUTPATIENT
Start: 2020-06-18 | End: 2020-06-18

## 2020-06-18 RX ORDER — DEXTROSE 50 % IN WATER 50 %
15 SYRINGE (ML) INTRAVENOUS ONCE
Refills: 0 | Status: DISCONTINUED | OUTPATIENT
Start: 2020-06-18 | End: 2020-06-22

## 2020-06-18 RX ORDER — LISINOPRIL 2.5 MG/1
10 TABLET ORAL DAILY
Refills: 0 | Status: DISCONTINUED | OUTPATIENT
Start: 2020-06-18 | End: 2020-06-22

## 2020-06-18 RX ORDER — SODIUM CHLORIDE 9 MG/ML
3 INJECTION INTRAMUSCULAR; INTRAVENOUS; SUBCUTANEOUS EVERY 8 HOURS
Refills: 0 | Status: DISCONTINUED | OUTPATIENT
Start: 2020-06-18 | End: 2020-06-18

## 2020-06-18 RX ORDER — OXYCODONE HYDROCHLORIDE 5 MG/1
10 TABLET ORAL EVERY 6 HOURS
Refills: 0 | Status: DISCONTINUED | OUTPATIENT
Start: 2020-06-18 | End: 2020-06-22

## 2020-06-18 RX ORDER — OXYCODONE HYDROCHLORIDE 5 MG/1
5 TABLET ORAL EVERY 6 HOURS
Refills: 0 | Status: DISCONTINUED | OUTPATIENT
Start: 2020-06-18 | End: 2020-06-22

## 2020-06-18 RX ORDER — SODIUM CHLORIDE 9 MG/ML
1000 INJECTION INTRAMUSCULAR; INTRAVENOUS; SUBCUTANEOUS
Refills: 0 | Status: DISCONTINUED | OUTPATIENT
Start: 2020-06-18 | End: 2020-06-21

## 2020-06-18 RX ORDER — ELTROMBOPAG OLAMINE 50 MG/1
75 TABLET, FILM COATED ORAL DAILY
Refills: 0 | Status: DISCONTINUED | OUTPATIENT
Start: 2020-06-18 | End: 2020-06-22

## 2020-06-18 RX ORDER — PANTOPRAZOLE SODIUM 20 MG/1
40 TABLET, DELAYED RELEASE ORAL
Refills: 0 | Status: DISCONTINUED | OUTPATIENT
Start: 2020-06-18 | End: 2020-06-22

## 2020-06-18 RX ORDER — ACETAMINOPHEN 500 MG
325 TABLET ORAL EVERY 4 HOURS
Refills: 0 | Status: DISCONTINUED | OUTPATIENT
Start: 2020-06-18 | End: 2020-06-22

## 2020-06-18 RX ORDER — INSULIN LISPRO 100/ML
VIAL (ML) SUBCUTANEOUS AT BEDTIME
Refills: 0 | Status: DISCONTINUED | OUTPATIENT
Start: 2020-06-18 | End: 2020-06-22

## 2020-06-18 RX ORDER — CEFAZOLIN SODIUM 1 G
2000 VIAL (EA) INJECTION EVERY 8 HOURS
Refills: 0 | Status: COMPLETED | OUTPATIENT
Start: 2020-06-18 | End: 2020-06-19

## 2020-06-18 RX ORDER — HYDROCORTISONE 20 MG
100 TABLET ORAL EVERY 8 HOURS
Refills: 0 | Status: COMPLETED | OUTPATIENT
Start: 2020-06-18 | End: 2020-06-19

## 2020-06-18 RX ORDER — LEVOTHYROXINE SODIUM 125 MCG
300 TABLET ORAL DAILY
Refills: 0 | Status: DISCONTINUED | OUTPATIENT
Start: 2020-06-18 | End: 2020-06-19

## 2020-06-18 RX ORDER — GLUCAGON INJECTION, SOLUTION 0.5 MG/.1ML
1 INJECTION, SOLUTION SUBCUTANEOUS ONCE
Refills: 0 | Status: DISCONTINUED | OUTPATIENT
Start: 2020-06-18 | End: 2020-06-22

## 2020-06-18 RX ORDER — INSULIN LISPRO 100/ML
VIAL (ML) SUBCUTANEOUS
Refills: 0 | Status: DISCONTINUED | OUTPATIENT
Start: 2020-06-18 | End: 2020-06-22

## 2020-06-18 RX ORDER — HYDROMORPHONE HYDROCHLORIDE 2 MG/ML
1 INJECTION INTRAMUSCULAR; INTRAVENOUS; SUBCUTANEOUS
Refills: 0 | Status: DISCONTINUED | OUTPATIENT
Start: 2020-06-18 | End: 2020-06-18

## 2020-06-18 RX ORDER — SODIUM CHLORIDE 9 MG/ML
1000 INJECTION, SOLUTION INTRAVENOUS
Refills: 0 | Status: DISCONTINUED | OUTPATIENT
Start: 2020-06-18 | End: 2020-06-22

## 2020-06-18 RX ADMIN — OXYCODONE HYDROCHLORIDE 10 MILLIGRAM(S): 5 TABLET ORAL at 22:07

## 2020-06-18 RX ADMIN — HYDROMORPHONE HYDROCHLORIDE 0.5 MILLIGRAM(S): 2 INJECTION INTRAMUSCULAR; INTRAVENOUS; SUBCUTANEOUS at 15:35

## 2020-06-18 RX ADMIN — HYDROMORPHONE HYDROCHLORIDE 0.5 MILLIGRAM(S): 2 INJECTION INTRAMUSCULAR; INTRAVENOUS; SUBCUTANEOUS at 16:15

## 2020-06-18 RX ADMIN — PANTOPRAZOLE SODIUM 40 MILLIGRAM(S): 20 TABLET, DELAYED RELEASE ORAL at 22:08

## 2020-06-18 RX ADMIN — Medication 300 MICROGRAM(S): at 22:06

## 2020-06-18 RX ADMIN — Medication 100 MILLIGRAM(S): at 22:08

## 2020-06-18 RX ADMIN — Medication 4: at 17:15

## 2020-06-18 RX ADMIN — Medication 100 MILLIGRAM(S): at 22:06

## 2020-06-18 RX ADMIN — SODIUM CHLORIDE 75 MILLILITER(S): 9 INJECTION INTRAMUSCULAR; INTRAVENOUS; SUBCUTANEOUS at 15:00

## 2020-06-18 NOTE — PHYSICAL THERAPY INITIAL EVALUATION ADULT - ADDITIONAL COMMENTS
Pt lives in a duplex home w/ 4 steps entry +HR and a flight to bed room and bathroom, pt was independent w/ all ADLs and functional mobility PTA. Pt is planing to go back to her friends home where there are no steps at entry through the back yard.

## 2020-06-18 NOTE — PHYSICAL THERAPY INITIAL EVALUATION ADULT - PERTINENT HX OF CURRENT PROBLEM, REHAB EVAL
65 y/o female with h/o HTN, obesity, GERD, HLD, DM2, ITP diagnosed in 02/2019 on Prednisone and Promacta with platelets greatly fluctuating all the time, lowest being 14 and highest in 400s  (sees hematologist weekly with adjustment of meds). Pt was diagnosed with T4, T5, T6 compression fractures, probably due to use of steroids. Pt now s/p T4-6 kyphoplasty on 6/18

## 2020-06-18 NOTE — PHYSICAL THERAPY INITIAL EVALUATION ADULT - FUNCTIONAL LIMITATIONS, PT EVAL
self-care/home management Same Histology In Subsequent Stages Text: The pattern and morphology of the tumor is as described in the first stage.

## 2020-06-18 NOTE — PHYSICAL THERAPY INITIAL EVALUATION ADULT - TRANSFER TRAINING, PT EVAL
GOAL: Pt will perform sit to/from stand transfers independently with/without AD as needed within 1-2 weeks.

## 2020-06-19 DIAGNOSIS — E78.5 HYPERLIPIDEMIA, UNSPECIFIED: ICD-10-CM

## 2020-06-19 DIAGNOSIS — Z29.9 ENCOUNTER FOR PROPHYLACTIC MEASURES, UNSPECIFIED: ICD-10-CM

## 2020-06-19 DIAGNOSIS — E11.9 TYPE 2 DIABETES MELLITUS WITHOUT COMPLICATIONS: ICD-10-CM

## 2020-06-19 DIAGNOSIS — K21.9 GASTRO-ESOPHAGEAL REFLUX DISEASE WITHOUT ESOPHAGITIS: ICD-10-CM

## 2020-06-19 DIAGNOSIS — I10 ESSENTIAL (PRIMARY) HYPERTENSION: ICD-10-CM

## 2020-06-19 DIAGNOSIS — S22.000A WEDGE COMPRESSION FRACTURE OF UNSPECIFIED THORACIC VERTEBRA, INITIAL ENCOUNTER FOR CLOSED FRACTURE: ICD-10-CM

## 2020-06-19 LAB
ALBUMIN SERPL ELPH-MCNC: 3.4 G/DL — SIGNIFICANT CHANGE UP (ref 3.3–5)
ANION GAP SERPL CALC-SCNC: 11 MMOL/L — SIGNIFICANT CHANGE UP (ref 5–17)
BUN SERPL-MCNC: 13 MG/DL — SIGNIFICANT CHANGE UP (ref 7–23)
CALCIUM SERPL-MCNC: 7.7 MG/DL — LOW (ref 8.4–10.5)
CHLORIDE SERPL-SCNC: 106 MMOL/L — SIGNIFICANT CHANGE UP (ref 96–108)
CO2 SERPL-SCNC: 21 MMOL/L — LOW (ref 22–31)
CREAT SERPL-MCNC: 1.08 MG/DL — SIGNIFICANT CHANGE UP (ref 0.5–1.3)
GLUCOSE BLDC GLUCOMTR-MCNC: 223 MG/DL — HIGH (ref 70–99)
GLUCOSE BLDC GLUCOMTR-MCNC: 244 MG/DL — HIGH (ref 70–99)
GLUCOSE BLDC GLUCOMTR-MCNC: 254 MG/DL — HIGH (ref 70–99)
GLUCOSE BLDC GLUCOMTR-MCNC: 348 MG/DL — HIGH (ref 70–99)
GLUCOSE SERPL-MCNC: 202 MG/DL — HIGH (ref 70–99)
HCT VFR BLD CALC: 27.5 % — LOW (ref 34.5–45)
HGB BLD-MCNC: 8.1 G/DL — LOW (ref 11.5–15.5)
MCHC RBC-ENTMCNC: 24 PG — LOW (ref 27–34)
MCHC RBC-ENTMCNC: 29.5 GM/DL — LOW (ref 32–36)
MCV RBC AUTO: 81.6 FL — SIGNIFICANT CHANGE UP (ref 80–100)
NRBC # BLD: 0 /100 WBCS — SIGNIFICANT CHANGE UP (ref 0–0)
PLATELET # BLD AUTO: 77 K/UL — LOW (ref 150–400)
POTASSIUM SERPL-MCNC: 4.3 MMOL/L — SIGNIFICANT CHANGE UP (ref 3.5–5.3)
POTASSIUM SERPL-SCNC: 4.3 MMOL/L — SIGNIFICANT CHANGE UP (ref 3.5–5.3)
RBC # BLD: 3.37 M/UL — LOW (ref 3.8–5.2)
RBC # FLD: 18.6 % — HIGH (ref 10.3–14.5)
SODIUM SERPL-SCNC: 138 MMOL/L — SIGNIFICANT CHANGE UP (ref 135–145)
WBC # BLD: 12.02 K/UL — HIGH (ref 3.8–10.5)
WBC # FLD AUTO: 12.02 K/UL — HIGH (ref 3.8–10.5)

## 2020-06-19 PROCEDURE — 99223 1ST HOSP IP/OBS HIGH 75: CPT

## 2020-06-19 PROCEDURE — 99222 1ST HOSP IP/OBS MODERATE 55: CPT | Mod: GC

## 2020-06-19 RX ORDER — INSULIN LISPRO 100/ML
5 VIAL (ML) SUBCUTANEOUS
Refills: 0 | Status: DISCONTINUED | OUTPATIENT
Start: 2020-06-19 | End: 2020-06-20

## 2020-06-19 RX ORDER — POLYETHYLENE GLYCOL 3350 17 G/17G
17 POWDER, FOR SOLUTION ORAL DAILY
Refills: 0 | Status: DISCONTINUED | OUTPATIENT
Start: 2020-06-19 | End: 2020-06-21

## 2020-06-19 RX ORDER — INSULIN GLARGINE 100 [IU]/ML
20 INJECTION, SOLUTION SUBCUTANEOUS AT BEDTIME
Refills: 0 | Status: DISCONTINUED | OUTPATIENT
Start: 2020-06-19 | End: 2020-06-20

## 2020-06-19 RX ORDER — CALCIUM GLUCONATE 100 MG/ML
1 VIAL (ML) INTRAVENOUS ONCE
Refills: 0 | Status: COMPLETED | OUTPATIENT
Start: 2020-06-19 | End: 2020-06-19

## 2020-06-19 RX ORDER — SENNA PLUS 8.6 MG/1
2 TABLET ORAL AT BEDTIME
Refills: 0 | Status: DISCONTINUED | OUTPATIENT
Start: 2020-06-19 | End: 2020-06-21

## 2020-06-19 RX ORDER — LEVOTHYROXINE SODIUM 125 MCG
500 TABLET ORAL DAILY
Refills: 0 | Status: DISCONTINUED | OUTPATIENT
Start: 2020-06-19 | End: 2020-06-22

## 2020-06-19 RX ORDER — CALCIUM GLUCONATE 100 MG/ML
2 VIAL (ML) INTRAVENOUS ONCE
Refills: 0 | Status: DISCONTINUED | OUTPATIENT
Start: 2020-06-19 | End: 2020-06-19

## 2020-06-19 RX ORDER — ACETAMINOPHEN 500 MG
1000 TABLET ORAL ONCE
Refills: 0 | Status: COMPLETED | OUTPATIENT
Start: 2020-06-19 | End: 2020-06-19

## 2020-06-19 RX ADMIN — Medication 145 MILLIGRAM(S): at 06:11

## 2020-06-19 RX ADMIN — Medication 4: at 09:21

## 2020-06-19 RX ADMIN — Medication 100 MILLIGRAM(S): at 06:11

## 2020-06-19 RX ADMIN — SENNA PLUS 2 TABLET(S): 8.6 TABLET ORAL at 21:44

## 2020-06-19 RX ADMIN — Medication 100 GRAM(S): at 15:08

## 2020-06-19 RX ADMIN — Medication 100 MILLIGRAM(S): at 14:27

## 2020-06-19 RX ADMIN — PANTOPRAZOLE SODIUM 40 MILLIGRAM(S): 20 TABLET, DELAYED RELEASE ORAL at 06:11

## 2020-06-19 RX ADMIN — OXYCODONE HYDROCHLORIDE 10 MILLIGRAM(S): 5 TABLET ORAL at 18:29

## 2020-06-19 RX ADMIN — Medication 400 MILLIGRAM(S): at 03:07

## 2020-06-19 RX ADMIN — Medication 6: at 12:39

## 2020-06-19 RX ADMIN — Medication 325 MILLIGRAM(S): at 21:43

## 2020-06-19 RX ADMIN — ELTROMBOPAG OLAMINE 75 MILLIGRAM(S): 50 TABLET, FILM COATED ORAL at 06:11

## 2020-06-19 RX ADMIN — INSULIN GLARGINE 20 UNIT(S): 100 INJECTION, SOLUTION SUBCUTANEOUS at 21:43

## 2020-06-19 RX ADMIN — Medication 5 UNIT(S): at 17:40

## 2020-06-19 RX ADMIN — Medication 4: at 17:41

## 2020-06-19 RX ADMIN — Medication 4: at 21:43

## 2020-06-19 RX ADMIN — Medication 5 MILLIGRAM(S): at 18:29

## 2020-06-19 RX ADMIN — SODIUM CHLORIDE 75 MILLILITER(S): 9 INJECTION INTRAMUSCULAR; INTRAVENOUS; SUBCUTANEOUS at 06:11

## 2020-06-19 RX ADMIN — Medication 300 MICROGRAM(S): at 06:11

## 2020-06-19 NOTE — CONSULT NOTE ADULT - ATTENDING COMMENTS
pt has hx of ITP on promacta 75mg with prednisone 5mg po daily  recc maintain current meds at present dose  steroids can be tapered as outpt  pt has hematology f/u qweekly as outpt

## 2020-06-19 NOTE — PROGRESS NOTE ADULT - SUBJECTIVE AND OBJECTIVE BOX
HPI:  Patient is a 65 y/o female with h/o HTN, obesity, GERD, HLD, DM2, ITP diagnosed in 02/2019 on Prednisone and Promacta with platelets greatly fluctuating all the time, lowest being 14 and highest in 400s  (sees hematologist weekly with adjustment of meds), who was diagnosed with T4, T5, T6 compression fractures, probably due to use of steroids. She is scheduled for T4, T5, T6 Kyphoplasty. (11 Jun 2020 08:54)    OVERNIGHT EVENTS:  Vital Signs Last 24 Hrs  T(C): 36.7 (19 Jun 2020 05:10), Max: 36.7 (19 Jun 2020 05:10)  T(F): 98.1 (19 Jun 2020 05:10), Max: 98.1 (19 Jun 2020 05:10)  HR: 63 (19 Jun 2020 05:10) (62 - 88)  BP: 102/54 (19 Jun 2020 06:04) (96/58 - 132/61)  BP(mean): 88 (18 Jun 2020 20:00) (70 - 88)  RR: 16 (19 Jun 2020 05:10) (16 - 16)  SpO2: 94% (19 Jun 2020 05:10) (91% - 100%)    I&O's Detail    18 Jun 2020 07:01  -  19 Jun 2020 07:00  --------------------------------------------------------  IN:    Oral Fluid: 300 mL    Packed Red Blood Cells: 350 mL    sodium chloride 0.9%.: 1050 mL  Total IN: 1700 mL    OUT:    Accordian: 115 mL    Accordian: 85 mL    Indwelling Catheter - Urethral: 945 mL  Total OUT: 1145 mL    Total NET: 555 mL        I&O's Summary    18 Jun 2020 07:01  -  19 Jun 2020 07:00  --------------------------------------------------------  IN: 1700 mL / OUT: 1145 mL / NET: 555 mL        PHYSICAL EXAM:  Neurological:    Motor exam:         [x] Upper extremity                 D             B          T          WE       WF      HI                                               R         5/5        5/5        5/5       5/5     5/5       5/5                                               L          5/5        5/5        5/5       5/5     5/5       5/5         [x] Lower extremity                Ps          Ha        Quad    EHL        FHL                                               R        5/5        5/5        5/5       5/5         5/5                                               L         5/5        5/5       5/5       5/5          5/5                                                        [] warm well perfused; capillary refill <3 seconds     Sensation: [x] intact to light touch  [] decreased:     Gait NT    Cardiovascular:  Respiratory:  Gastrointestinal:  Genitourinary:  Extremities:  Incision/Wound: Clean and dry    TUBES/LINES:  [] CVC  [] A-line  [] Lumbar Drain  [] Ventriculostomy  [] Other    DIET:  [] NPO  [] Mechanical  [] Tube feeds    LABS:                        8.1    12.02 )-----------( 77       ( 19 Jun 2020 07:02 )             27.5     06-19    138  |  106  |  13  ----------------------------<  202<H>  4.3   |  21<L>  |  1.08    Ca    7.7<L>      19 Jun 2020 07:02              CAPILLARY BLOOD GLUCOSE      POCT Blood Glucose.: 143 mg/dL (18 Jun 2020 21:12)  POCT Blood Glucose.: 222 mg/dL (18 Jun 2020 17:13)          Drug Levels: [] N/A    CSF Analysis: [] N/A      Allergies    Cipro (Hives)  coconuts (Hives)  fluoroquinolone antibiotics (Hives)  raspberries &amp; blueberries (Hives)  splenda (Hives)  Vitamin D (Hives)    Intolerances      MEDICATIONS:  Antibiotics:  ceFAZolin   IVPB 2000 milliGRAM(s) IV Intermittent once    Neuro:  acetaminophen   Tablet .. 325 milliGRAM(s) Oral every 4 hours PRN  oxyCODONE    IR 5 milliGRAM(s) Oral every 6 hours PRN  oxyCODONE    IR 10 milliGRAM(s) Oral every 6 hours PRN    Anticoagulation:  eltrombopag 75 milliGRAM(s) Oral daily    OTHER:  dextrose 40% Gel 15 Gram(s) Oral once PRN  dextrose 50% Injectable 12.5 Gram(s) IV Push once  fenofibrate Tablet 145 milliGRAM(s) Oral daily  glucagon  Injectable 1 milliGRAM(s) IntraMuscular once PRN  hydrocortisone sodium succinate Injectable 100 milliGRAM(s) IV Push every 8 hours  insulin lispro (HumaLOG) corrective regimen sliding scale   SubCutaneous at bedtime  insulin lispro (HumaLOG) corrective regimen sliding scale   SubCutaneous three times a day before meals  levothyroxine 300 MICROGram(s) Oral daily  lisinopril 10 milliGRAM(s) Oral daily  pantoprazole    Tablet 40 milliGRAM(s) Oral before breakfast  polyethylene glycol 3350 17 Gram(s) Oral daily  senna 2 Tablet(s) Oral at bedtime    IVF:  dextrose 5%. 1000 milliLiter(s) IV Continuous <Continuous>  sodium chloride 0.9%. 1000 milliLiter(s) IV Continuous <Continuous>    CULTURES:    RADIOLOGY & ADDITIONAL TESTS:      ASSESSMENT:  HPI:  Patient is a 65 y/o female with h/o HTN, obesity, GERD, HLD, DM2, ITP diagnosed in 02/2019 on Prednisone and Promacta with platelets greatly fluctuating all the time, lowest being 14 and highest in 400s  (sees hematologist weekly with adjustment of meds), who was diagnosed with T4, T5, T6 compression fractures, probably due to use of steroids. She is scheduled for T4, T5, T6 Kyphoplasty. (11 Jun 2020 08:54)    64y Female s/p    PLAN:  NEURO:  CARDIOVASCULAR:  PULMONARY:  RENAL:  GI:  HEME:  ID:  ENDO:    DVT PROPHYLAXIS:  [x] Venodynes                                [x] Heparin/Lovenox    FALL RISK:  [] Low Risk                                    [] Impulsive

## 2020-06-19 NOTE — CONSULT NOTE ADULT - SUBJECTIVE AND OBJECTIVE BOX
Hematology Consult Note    HPI:  Patient is a 63 y/o female with h/o HTN, obesity, GERD, HLD, DM2, ITP diagnosed in 02/2019 on Prednisone and Promacta with platelets greatly fluctuating all the time, lowest being 14 and highest in 400s  (sees hematologist weekly with adjustment of meds), who was diagnosed with T4, T5, T6 compression fractures, probably due to use of steroids. She is scheduled for T4, T5, T6 Kyphoplasty. (11 Jun 2020 08:54)      Allergies    Cipro (Hives)  coconuts (Hives)  fluoroquinolone antibiotics (Hives)  raspberries &amp; blueberries (Hives)  splenda (Hives)  Vitamin D (Hives)    Intolerances        MEDICATIONS  (STANDING):  calcium gluconate IVPB 2 Gram(s) IV Intermittent once  ceFAZolin   IVPB 2000 milliGRAM(s) IV Intermittent once  dextrose 5%. 1000 milliLiter(s) (50 mL/Hr) IV Continuous <Continuous>  dextrose 50% Injectable 12.5 Gram(s) IV Push once  eltrombopag 75 milliGRAM(s) Oral daily  fenofibrate Tablet 145 milliGRAM(s) Oral daily  insulin glargine Injectable (LANTUS) 20 Unit(s) SubCutaneous at bedtime  insulin lispro (HumaLOG) corrective regimen sliding scale   SubCutaneous at bedtime  insulin lispro (HumaLOG) corrective regimen sliding scale   SubCutaneous three times a day before meals  insulin lispro Injectable (HumaLOG) 5 Unit(s) SubCutaneous three times a day with meals  levothyroxine 500 MICROGram(s) Oral daily  lisinopril 10 milliGRAM(s) Oral daily  pantoprazole    Tablet 40 milliGRAM(s) Oral before breakfast  polyethylene glycol 3350 17 Gram(s) Oral daily  senna 2 Tablet(s) Oral at bedtime  sodium chloride 0.9%. 1000 milliLiter(s) (75 mL/Hr) IV Continuous <Continuous>    MEDICATIONS  (PRN):  acetaminophen   Tablet .. 325 milliGRAM(s) Oral every 4 hours PRN Temp greater or equal to 38C (100.4F), Mild Pain (1 - 3)  dextrose 40% Gel 15 Gram(s) Oral once PRN Blood Glucose LESS THAN 70 milliGRAM(s)/deciliter  glucagon  Injectable 1 milliGRAM(s) IntraMuscular once PRN Glucose LESS THAN 70 milligrams/deciliter  oxyCODONE    IR 5 milliGRAM(s) Oral every 6 hours PRN Moderate Pain (4 - 6)  oxyCODONE    IR 10 milliGRAM(s) Oral every 6 hours PRN Severe Pain (7 - 10)      PAST MEDICAL & SURGICAL HISTORY:  Hepatomegaly: US and CT 02/2019 with possible cirrhosis  Chronic ITP (idiopathic thrombocytopenia): diagnosed 02/2019, lowest platelets 14, on Prednisone and Promacta, platelets levels fluctuate, Pt. sees hematologist weekly, doses adjusted weekly  Uterine polyp  Breast lump: right breast  GERD (gastroesophageal reflux disease)  Morbid Obesity (ICD9 278.01)  Gall Stones (ICD9 574.20)  H/O: Hypothyroidism (ICD9 V12.2)  Diabetes Mellitus Type 2 in Obese (ICD9 250.00)  Cholesterol Serum Elevated (ICD9 272.9)  H/O: Hypertension (ICD9 V12.59)  History of D&C: with hysteroscopy, polypectomy, 08/01/08  S/P laparoscopic cholecystectomy: 02/17/2012  H/O cystoscopy: ureteroscopy with Right ureteral stent, 11/24/2009  History of cholecystectomy  History of Breast Biopsy (ICD9 V45.89): x3 left breast benign  Heel Spur (ICD9 726.73): bilateral heel spurs  repaired  S/P Carpal Tunnel Release (ICD9 V45.89): left arm  Abscess of Appendix (ICD9 540.1): S/P appendectomy, 1990  C Section (ICD9 669.70)  H/O: Hysterectomy (ICD9 V88.01): - supracervical, with BSO, Exploratory Laparotomy, 10/17/08      FAMILY HISTORY:  Family history of breast cancer (Mother)      SOCIAL HISTORY: No EtOH, no tobacco    REVIEW OF SYSTEMS:    CONSTITUTIONAL: No weakness, fevers or chills  EYES/ENT: No visual changes;  No vertigo or throat pain   NECK: No pain or stiffness  RESPIRATORY: No cough, wheezing, hemoptysis; No shortness of breath  CARDIOVASCULAR: No chest pain or palpitations  GASTROINTESTINAL: No abdominal or epigastric pain. No nausea, vomiting, or hematemesis; No diarrhea or constipation. No melena or hematochezia.  GENITOURINARY: No dysuria, frequency or hematuria  NEUROLOGICAL: No numbness or weakness  SKIN: No itching, burning, rashes, or lesions   All other review of systems is negative unless indicated above.        T(F): 98.3 (06-19-20 @ 12:23), Max: 98.3 (06-19-20 @ 12:23)  HR: 68 (06-19-20 @ 12:23)  BP: 105/65 (06-19-20 @ 12:23)  RR: 18 (06-19-20 @ 12:23)  SpO2: 94% (06-19-20 @ 12:23)  Wt(kg): --    Physical Exam  GENERAL: NAD, well-developed  HEAD:  Atraumatic, Normocephalic  EYES: EOMI, PERRLA, conjunctiva and sclera clear  NECK: Supple, No JVD  CHEST/LUNG: Clear to auscultation bilaterally; No wheeze  HEART: Regular rate and rhythm; No murmurs, rubs, or gallops  ABDOMEN: Soft, Nontender, Nondistended; Bowel sounds present  EXTREMITIES:  2+ Peripheral Pulses, No clubbing, cyanosis, or edema  NEUROLOGY: non-focal  SKIN: No rashes or lesions                          8.1    12.02 )-----------( 77       ( 19 Jun 2020 07:02 )             27.5       06-19    138  |  106  |  13  ----------------------------<  202<H>  4.3   |  21<L>  |  1.08    Ca    7.7<L>      19 Jun 2020 07:02    TPro  x   /  Alb  3.4  /  TBili  x   /  DBili  x   /  AST  x   /  ALT  x   /  AlkPhos  x   06-19 Hematology Consult Note    HPI:  Patient is a 65 y/o female with h/o HTN, obesity, GERD, HLD, DM2, ITP diagnosed in 02/2019 on Prednisone and Promacta with platelets greatly fluctuating all the time, lowest being 14 and highest in 400s  (sees hematologist weekly with adjustment of meds), who was diagnosed with T4, T5, T6 compression fractures, probably due to use of steroids. She is scheduled for T4, T5, T6 Kyphoplasty. (11 Jun 2020 08:54)    Hematology is consulted for h/o ITP.    Allergies    Cipro (Hives)  coconuts (Hives)  fluoroquinolone antibiotics (Hives)  raspberries &amp; blueberries (Hives)  splenda (Hives)  Vitamin D (Hives)    Intolerances        MEDICATIONS  (STANDING):  calcium gluconate IVPB 2 Gram(s) IV Intermittent once  ceFAZolin   IVPB 2000 milliGRAM(s) IV Intermittent once  dextrose 5%. 1000 milliLiter(s) (50 mL/Hr) IV Continuous <Continuous>  dextrose 50% Injectable 12.5 Gram(s) IV Push once  eltrombopag 75 milliGRAM(s) Oral daily  fenofibrate Tablet 145 milliGRAM(s) Oral daily  insulin glargine Injectable (LANTUS) 20 Unit(s) SubCutaneous at bedtime  insulin lispro (HumaLOG) corrective regimen sliding scale   SubCutaneous at bedtime  insulin lispro (HumaLOG) corrective regimen sliding scale   SubCutaneous three times a day before meals  insulin lispro Injectable (HumaLOG) 5 Unit(s) SubCutaneous three times a day with meals  levothyroxine 500 MICROGram(s) Oral daily  lisinopril 10 milliGRAM(s) Oral daily  pantoprazole    Tablet 40 milliGRAM(s) Oral before breakfast  polyethylene glycol 3350 17 Gram(s) Oral daily  senna 2 Tablet(s) Oral at bedtime  sodium chloride 0.9%. 1000 milliLiter(s) (75 mL/Hr) IV Continuous <Continuous>    MEDICATIONS  (PRN):  acetaminophen   Tablet .. 325 milliGRAM(s) Oral every 4 hours PRN Temp greater or equal to 38C (100.4F), Mild Pain (1 - 3)  dextrose 40% Gel 15 Gram(s) Oral once PRN Blood Glucose LESS THAN 70 milliGRAM(s)/deciliter  glucagon  Injectable 1 milliGRAM(s) IntraMuscular once PRN Glucose LESS THAN 70 milligrams/deciliter  oxyCODONE    IR 5 milliGRAM(s) Oral every 6 hours PRN Moderate Pain (4 - 6)  oxyCODONE    IR 10 milliGRAM(s) Oral every 6 hours PRN Severe Pain (7 - 10)      PAST MEDICAL & SURGICAL HISTORY:  Hepatomegaly: US and CT 02/2019 with possible cirrhosis  Chronic ITP (idiopathic thrombocytopenia): diagnosed 02/2019, lowest platelets 14, on Prednisone and Promacta, platelets levels fluctuate, Pt. sees hematologist weekly, doses adjusted weekly  Uterine polyp  Breast lump: right breast  GERD (gastroesophageal reflux disease)  Morbid Obesity (ICD9 278.01)  Gall Stones (ICD9 574.20)  H/O: Hypothyroidism (ICD9 V12.2)  Diabetes Mellitus Type 2 in Obese (ICD9 250.00)  Cholesterol Serum Elevated (ICD9 272.9)  H/O: Hypertension (ICD9 V12.59)  History of D&C: with hysteroscopy, polypectomy, 08/01/08  S/P laparoscopic cholecystectomy: 02/17/2012  H/O cystoscopy: ureteroscopy with Right ureteral stent, 11/24/2009  History of cholecystectomy  History of Breast Biopsy (ICD9 V45.89): x3 left breast benign  Heel Spur (ICD9 726.73): bilateral heel spurs  repaired  S/P Carpal Tunnel Release (ICD9 V45.89): left arm  Abscess of Appendix (ICD9 540.1): S/P appendectomy, 1990  C Section (ICD9 669.70)  H/O: Hysterectomy (ICD9 V88.01): - supracervical, with BSO, Exploratory Laparotomy, 10/17/08      FAMILY HISTORY:  Family history of breast cancer (Mother)      SOCIAL HISTORY: No EtOH, no tobacco    REVIEW OF SYSTEMS:    CONSTITUTIONAL: No weakness, fevers or chills  EYES/ENT: No visual changes;  No vertigo or throat pain   NECK: No pain or stiffness  RESPIRATORY: No cough, wheezing, hemoptysis; No shortness of breath  CARDIOVASCULAR: No chest pain or palpitations  GASTROINTESTINAL: No abdominal or epigastric pain. No nausea, vomiting, or hematemesis; No diarrhea or constipation. No melena or hematochezia.  GENITOURINARY: No dysuria, frequency or hematuria  NEUROLOGICAL: No numbness or weakness  SKIN: No itching, burning, rashes, or lesions   All other review of systems is negative unless indicated above.        T(F): 98.3 (06-19-20 @ 12:23), Max: 98.3 (06-19-20 @ 12:23)  HR: 68 (06-19-20 @ 12:23)  BP: 105/65 (06-19-20 @ 12:23)  RR: 18 (06-19-20 @ 12:23)  SpO2: 94% (06-19-20 @ 12:23)  Wt(kg): --    Physical Exam  GENERAL: NAD, well-developed  HEAD:  Atraumatic, Normocephalic  EYES: EOMI, PERRLA, conjunctiva and sclera clear  NECK: Supple, No JVD  CHEST/LUNG: Clear to auscultation bilaterally; No wheeze  HEART: Regular rate and rhythm; No murmurs, rubs, or gallops  ABDOMEN: Soft, Nontender, Nondistended; Bowel sounds present  EXTREMITIES:  2+ Peripheral Pulses, No clubbing, cyanosis, or edema  NEUROLOGY: non-focal  SKIN: No rashes or lesions                          8.1    12.02 )-----------( 77       ( 19 Jun 2020 07:02 )             27.5       06-19    138  |  106  |  13  ----------------------------<  202<H>  4.3   |  21<L>  |  1.08    Ca    7.7<L>      19 Jun 2020 07:02    TPro  x   /  Alb  3.4  /  TBili  x   /  DBili  x   /  AST  x   /  ALT  x   /  AlkPhos  x   06-19 Hematology Consult Note    HPI:  Patient is a 65 y/o female with h/o HTN, obesity, GERD, HLD, DM2, ITP diagnosed in 02/2019 on Prednisone and Promacta with platelets greatly fluctuating all the time, lowest being 14 and highest in 400s  (sees hematologist weekly with adjustment of meds), who was diagnosed with T4, T5, T6 compression fractures, probably due to use of steroids. She is scheduled for T4, T5, T6 Kyphoplasty. (11 Jun 2020 08:54)    Hematology is consulted for h/o ITP.    Allergies    Cipro (Hives)  coconuts (Hives)  fluoroquinolone antibiotics (Hives)  raspberries &amp; blueberries (Hives)  splenda (Hives)  Vitamin D (Hives)    Intolerances        MEDICATIONS  (STANDING):  calcium gluconate IVPB 2 Gram(s) IV Intermittent once  ceFAZolin   IVPB 2000 milliGRAM(s) IV Intermittent once  dextrose 5%. 1000 milliLiter(s) (50 mL/Hr) IV Continuous <Continuous>  dextrose 50% Injectable 12.5 Gram(s) IV Push once  eltrombopag 75 milliGRAM(s) Oral daily  fenofibrate Tablet 145 milliGRAM(s) Oral daily  insulin glargine Injectable (LANTUS) 20 Unit(s) SubCutaneous at bedtime  insulin lispro (HumaLOG) corrective regimen sliding scale   SubCutaneous at bedtime  insulin lispro (HumaLOG) corrective regimen sliding scale   SubCutaneous three times a day before meals  insulin lispro Injectable (HumaLOG) 5 Unit(s) SubCutaneous three times a day with meals  levothyroxine 500 MICROGram(s) Oral daily  lisinopril 10 milliGRAM(s) Oral daily  pantoprazole    Tablet 40 milliGRAM(s) Oral before breakfast  polyethylene glycol 3350 17 Gram(s) Oral daily  senna 2 Tablet(s) Oral at bedtime  sodium chloride 0.9%. 1000 milliLiter(s) (75 mL/Hr) IV Continuous <Continuous>    MEDICATIONS  (PRN):  acetaminophen   Tablet .. 325 milliGRAM(s) Oral every 4 hours PRN Temp greater or equal to 38C (100.4F), Mild Pain (1 - 3)  dextrose 40% Gel 15 Gram(s) Oral once PRN Blood Glucose LESS THAN 70 milliGRAM(s)/deciliter  glucagon  Injectable 1 milliGRAM(s) IntraMuscular once PRN Glucose LESS THAN 70 milligrams/deciliter  oxyCODONE    IR 5 milliGRAM(s) Oral every 6 hours PRN Moderate Pain (4 - 6)  oxyCODONE    IR 10 milliGRAM(s) Oral every 6 hours PRN Severe Pain (7 - 10)      PAST MEDICAL & SURGICAL HISTORY:  Hepatomegaly: US and CT 02/2019 with possible cirrhosis  Chronic ITP (idiopathic thrombocytopenia): diagnosed 02/2019, lowest platelets 14, on Prednisone and Promacta, platelets levels fluctuate, Pt. sees hematologist weekly, doses adjusted weekly  Uterine polyp  Breast lump: right breast  GERD (gastroesophageal reflux disease)  Morbid Obesity (ICD9 278.01)  Gall Stones (ICD9 574.20)  H/O: Hypothyroidism (ICD9 V12.2)  Diabetes Mellitus Type 2 in Obese (ICD9 250.00)  Cholesterol Serum Elevated (ICD9 272.9)  H/O: Hypertension (ICD9 V12.59)  History of D&C: with hysteroscopy, polypectomy, 08/01/08  S/P laparoscopic cholecystectomy: 02/17/2012  H/O cystoscopy: ureteroscopy with Right ureteral stent, 11/24/2009  History of cholecystectomy  History of Breast Biopsy (ICD9 V45.89): x3 left breast benign  Heel Spur (ICD9 726.73): bilateral heel spurs  repaired  S/P Carpal Tunnel Release (ICD9 V45.89): left arm  Abscess of Appendix (ICD9 540.1): S/P appendectomy, 1990  C Section (ICD9 669.70)  H/O: Hysterectomy (ICD9 V88.01): - supracervical, with BSO, Exploratory Laparotomy, 10/17/08      FAMILY HISTORY:  Family history of breast cancer (Mother)      SOCIAL HISTORY: No EtOH, no tobacco    REVIEW OF SYSTEMS:    CONSTITUTIONAL: No weakness, fevers or chills  EYES/ENT: No visual changes;  No vertigo or throat pain   NECK: No pain or stiffness  RESPIRATORY: No cough, wheezing, hemoptysis; No shortness of breath  CARDIOVASCULAR: No chest pain or palpitations  GASTROINTESTINAL: No abdominal or epigastric pain. No nausea, vomiting, or hematemesis; No diarrhea or constipation. No melena or hematochezia.  GENITOURINARY: No dysuria, frequency or hematuria  NEUROLOGICAL: No numbness or weakness  SKIN: No itching, burning, rashes, or lesions   All other review of systems is negative unless indicated above.        T(F): 98.3 (06-19-20 @ 12:23), Max: 98.3 (06-19-20 @ 12:23)  HR: 68 (06-19-20 @ 12:23)  BP: 105/65 (06-19-20 @ 12:23)  RR: 18 (06-19-20 @ 12:23)  SpO2: 94% (06-19-20 @ 12:23)  Wt(kg): --    Physical Exam  GENERAL: NAD, well-developed, obese  HEAD:  Atraumatic, Normocephalic  EYES: EOMI, PERRLA, conjunctiva and sclera clear  NECK: Supple, No JVD  CHEST/LUNG: Clear to auscultation bilaterally; No wheeze  HEART: Regular rate and rhythm; No murmurs, rubs, or gallops  ABDOMEN: Soft, Nontender, Nondistended; Bowel sounds present  EXTREMITIES:  2+ Peripheral Pulses, No clubbing, cyanosis, or edema  NEUROLOGY: non-focal  SKIN: multiple bruises                          8.1    12.02 )-----------( 77       ( 19 Jun 2020 07:02 )             27.5       06-19    138  |  106  |  13  ----------------------------<  202<H>  4.3   |  21<L>  |  1.08    Ca    7.7<L>      19 Jun 2020 07:02    TPro  x   /  Alb  3.4  /  TBili  x   /  DBili  x   /  AST  x   /  ALT  x   /  AlkPhos  x   06-19

## 2020-06-19 NOTE — CONSULT NOTE ADULT - PROBLEM SELECTOR RECOMMENDATION 4
on quinapril 10mg daily at home  - BP in acceptable range  - c/w lisinopril inpatient, when ready for discharge resume home quinapril

## 2020-06-19 NOTE — CONSULT NOTE ADULT - ASSESSMENT
63 y/o female with h/o HTN, obesity, GERD, HLD, DM2, ITP diagnosed in 02/2019 on Prednisone and Promacta with platelets was diagnosed with T4, T5, T6 compression fractures, probably due to use of steroids. She is s/p T4, T5, T6 Kyphoplasty. Hematology was consulted for ITP.      #ITP  - PLT stable at around 70's.  - Can continue promacta and steroids at home dose; DO NOT suddenly stop prednisone as pt is on chronic prednisone Tx(risk of adrenal crisis). Continue 5mg once daily.  - No immediate intervention needed at this point, pt can f/u with her outpatient hematologist after discharge.          Sanaz Kelly MD  PGY 4, Oncology/Hematology fellow  (P) [phone]  After 5pm, please contact on-call team.

## 2020-06-19 NOTE — CONSULT NOTE ADULT - PROBLEM SELECTOR RECOMMENDATION 3
on prednisone 5mg daily at home and promacta dosing adjusted weekly by her hematologist.  - on hydrocortisone, stress dosing, x 24 hours after which her home prednisone dosing should be resumed  - promacta dosing as per hematology  - monitor PLT daily

## 2020-06-19 NOTE — PROGRESS NOTE ADULT - ASSESSMENT
HPI:  Patient is a 64 year old female with a history of hypertension, obesity, hyperlipidemia, diabetes, and ITP with T4-T6 compression fractures.  Now presented for kyphoplasty.      PROCEDURE: s/p T4-T6 kyphoplasties on 6/18/2020  POD#1    PLAN:  Neuro:   -q4 hour neuro checks  -oxycodone and tylenol for pain control  -out of bed with assistance  -pt eval pending    Respiratory:   -keep O2 sat > 94%  -incentive spirometer for lung expansion    CV:  -keep sbp 100-140  -lisinopril for bp control  -fenfibrate for lipid control    Endocrine:   -synthroid for hypothyroid  -humalog sliding scale for glucose control    Heme/Onc:   -history of ITP, hydrocortisone 100q8 x24 hours  -hematology consulted, follow up recs  -continue promacta    Renal:   -voiding    ID:   -afebrile  -ancef for periop prophylaxis    GI:   -consistent carbohydrate diet  -protonix for gi prophylaxis  -senna and miralax for bowel regimen      Spectra #[id number]                    Assessment:  Please Check When Present   []  GCS  E   V  M     Heart Failure: []Acute, [] acute on chronic , []chronic  Heart Failure:  [] Diastolic (HFpEF), [] Systolic (HFrEF), []Combined (HFpEF and HFrEF), [] RHF, [] Pulm HTN, [] Other    [] ELMA, [] ATN, [] AIN, [] other  [] CKD1, [] CKD2, [] CKD 3, [] CKD 4, [] CKD 5, []ESRD    Encephalopathy: [] Metabolic, [] Hepatic, [] toxic, [] Neurological, [] Other    Abnormal Nurtitional Status: [] malnurtition (see nutrition note), [ ]underweight: BMI < 19, [] morbid obesity: BMI >40, [] Cachexia    [] Sepsis  [] hypovolemic shock,[] cardiogenic shock, [] hemorrhagic shock, [] neuogenic shock  [] Acute Respiratory Failure  []Cerebral edema, [] Brain compression/ herniation,   [] Functional quadriplegia  [] Acute blood loss anemia HPI:  Patient is a 64 year old female with a history of hypertension, obesity, hyperlipidemia, diabetes, and ITP with T4-T6 compression fractures.  Now presented for kyphoplasty.      PROCEDURE: s/p T4-T6 kyphoplasties on 6/18/2020  POD#1    PLAN:  Neuro:   -q4 hour neuro checks  -oxycodone and tylenol for pain control  -out of bed with assistance  -pt eval pending    Respiratory:   -keep O2 sat > 94%  -incentive spirometer for lung expansion    CV:  -keep sbp 100-140  -lisinopril for bp control  -fenfibrate for lipid control    Endocrine:   -synthroid for hypothyroid  -humalog sliding scale for glucose control    Heme/Onc:   -history of ITP, hydrocortisone 100q8 x24 hours  -hematology consulted, follow up recs  -continue promacta    Renal:   -d/c ike, trial of void    ID:   -afebrile  -ancef for periop prophylaxis    GI:   -consistent carbohydrate diet  -protonix for gi prophylaxis  -senna and miralax for bowel regimen      Spectra #[id number]                    Assessment:  Please Check When Present   []  GCS  E   V  M     Heart Failure: []Acute, [] acute on chronic , []chronic  Heart Failure:  [] Diastolic (HFpEF), [] Systolic (HFrEF), []Combined (HFpEF and HFrEF), [] RHF, [] Pulm HTN, [] Other    [] ELMA, [] ATN, [] AIN, [] other  [] CKD1, [] CKD2, [] CKD 3, [] CKD 4, [] CKD 5, []ESRD    Encephalopathy: [] Metabolic, [] Hepatic, [] toxic, [] Neurological, [] Other    Abnormal Nurtitional Status: [] malnurtition (see nutrition note), [ ]underweight: BMI < 19, [] morbid obesity: BMI >40, [] Cachexia    [] Sepsis  [] hypovolemic shock,[] cardiogenic shock, [] hemorrhagic shock, [] neuogenic shock  [] Acute Respiratory Failure  []Cerebral edema, [] Brain compression/ herniation,   [] Functional quadriplegia  [] Acute blood loss anemia

## 2020-06-19 NOTE — CONSULT NOTE ADULT - PROBLEM SELECTOR RECOMMENDATION 6
likely 2/2 to chronic steroid use.  - on omeprazole 40mg at home  - c/w pantoprazole while inpatient

## 2020-06-19 NOTE — CONSULT NOTE ADULT - ASSESSMENT
64 year old female with PMH of obesity, HTN, HLD, T2DM (A1c 7.3%), ITP diagnosed 1/2019 on prednisone and Promacta, hypothyroidism, and T4, T5, T6 compression fractures now s/p T4-T6 kyphoplasty on 6/18. Medicine consulted for hyperglycemia.

## 2020-06-19 NOTE — CONSULT NOTE ADULT - PROBLEM SELECTOR RECOMMENDATION 2
HbA1c 7.3% on admission. on glipizide-metformin 5-1000mg BID at home.   - FS with suboptimal control in setting of stress dose steroids  - c/w sliding scale insulin  - weight based lantus 20 units qHS ordered  - humalog 5 units qAC ordered, hold if NPO  - goal -180  - monitor FS qAC + qHS, will adjust accordingly

## 2020-06-19 NOTE — CHART NOTE - NSCHARTNOTEFT_GEN_A_CORE
CAPRINI SCORE [CLOT] Score on Admission for     AGE RELATED RISK FACTORS                                                          MOBILITY RELATED FACTORS  [ ] Age 41-60 years                                            (1 Point)                  [ ] Bed rest                                                        (1 Point)  [x] Age: 61-74 years                                           (2 Points)                [ ] Plaster cast                                                   (2 Points)  [ ] Age= 75 years                                              (3 Points)                  [ ] Bed bound for more than 72 hours                 (2 Points)    DISEASE RELATED RISK FACTORS                                                   GENDER SPECIFIC FACTORS  [ ] Edema in the lower extremities                       (1 Point)            [ ] Pregnancy                                                     (1 Point)  [ ] Varicose veins                                               (1 Point)                   [ ] Post-partum < 6 weeks                                   (1 Point)             [x] BMI > 25 Kg/m2                                            (1 Point)                   [ ] Hormonal therapy  or oral contraception          (1 Point)                 [ ] Sepsis (in the previous month)                        (1 Point)             [ ] History of pregnancy complications                 (1 point)  [ ] Pneumonia or serious lung disease                                             [ ] Unexplained or recurrent                     (1 Point)           (in the previous month)                               (1 Point)  [ ] Abnormal pulmonary function test                     (1 Point)         SURGERY RELATED RISK FACTORS (include planned surgeries)  [ ] Acute myocardial infarction                              (1 Point)               [ ]  Section                                             (1 Point)  [ ] Congestive heart failure (in the previous month)  (1 Point)      [ ] Minor surgery                                                  (1 Point)   [ ] Inflammatory bowel disease                             (1 Point)               [ ] Arthroscopic surgery                                        (2 Points)  [ ] Central venous access                                      (2 Points)                [x] General surgery lasting more than 45 minutes   (2 Points)       [ ] Stroke (in the previous month)                          (5 Points)            [ ] Elective arthroplasty                                         (5 Points)            [ ] current or past malignancy                              (2 Points)                                                                                                       HEMATOLOGY RELATED FACTORS                                                    TRAUMA RELATED RISK FACTORS  [ ] Prior episodes of VTE                                     (3 Points)                [ ] Fracture of the hip, pelvis, or leg                       (5 Points)  [ ] Positive family history for VTE                         (3 Points)             [ ] Acute spinal cord injury (in the previous month)  (5 Points)  [ ] Prothrombin 77674 A                                     (3 Points)                [ ] Paralysis  (less than 1 month)                             (5 Points)  [ ] Factor V Leiden                                             (3 Points)                   [ ] Multiple Trauma within 1 month                        (5 Points)  [ ] Lupus anticoagulants                                     (3 Points)                                                           [ ] Anticardiolipin antibodies                               (3 Points)                                                       [ ] High homocysteine in the blood                      (3 Points)                                             [ ] Other congenital or acquired thrombophilia      (3 Points)                                                [ ] Heparin induced thrombocytopenia                  (3 Points)                                          Total Score [     5     ]    Risk:  Very low 0   Low 1 to 2   Moderate 3 to 4   High =5       VTE Prophylaxis Recommendations:  [x] mechanical pneumatic compression devices                                      [ ] contraindicated: _____________________  [ ] chemo prophylaxis                                                                                   [x] contraindicated ______thrombocytopenia_________    **** HIGH LIKELIHOOD DVT PRESENT ON ADMISSION  [x] (please order LE dopplers within 24 hours of admission)

## 2020-06-19 NOTE — PROGRESS NOTE ADULT - SUBJECTIVE AND OBJECTIVE BOX
HPI:  Patient is a 64 year old female with a history of hypertension, obesity, hyperlipidemia, diabetes, and ITP with T4-T6 compression fractures.  Now presented for kyphoplasty.      OVERNIGHT EVENTS:  No acute events overnight.  Received 1 unit pRBC overnight.  Incisional pain well controlled.  Hematology consulted.    Vital Signs Last 24 Hrs  T(C): 36.7 (19 Jun 2020 05:10), Max: 36.7 (19 Jun 2020 05:10)  T(F): 98.1 (19 Jun 2020 05:10), Max: 98.1 (19 Jun 2020 05:10)  HR: 63 (19 Jun 2020 05:10) (62 - 88)  BP: 102/54 (19 Jun 2020 06:04) (96/58 - 132/61)  BP(mean): 88 (18 Jun 2020 20:00) (70 - 88)  RR: 16 (19 Jun 2020 05:10) (16 - 16)  SpO2: 94% (19 Jun 2020 05:10) (91% - 100%)    I&O's Detail    18 Jun 2020 07:01  -  19 Jun 2020 07:00  --------------------------------------------------------  IN:    Oral Fluid: 300 mL    Packed Red Blood Cells: 350 mL    sodium chloride 0.9%.: 1050 mL  Total IN: 1700 mL    OUT:    Accordian: 115 mL    Accordian: 85 mL    Indwelling Catheter - Urethral: 945 mL  Total OUT: 1145 mL    Total NET: 555 mL        I&O's Summary    18 Jun 2020 07:01  -  19 Jun 2020 07:00  --------------------------------------------------------  IN: 1700 mL / OUT: 1145 mL / NET: 555 mL        PHYSICAL EXAM:  Neurological:     Cardiovascular: +s1, s2  Respiratory: clear to auscultation b/l  Gastrointestinal: soft, non-distended, non-tender  Genitourinary: +ramires  Incision/Wound:    TUBES/LINES:  [x]     DIET:  [x] consistent carbohydrate    LABS:                        8.1    12.02 )-----------( 77       ( 19 Jun 2020 07:02 )             27.5     06-19    138  |  106  |  13  ----------------------------<  202<H>  4.3   |  21<L>  |  1.08    Ca    7.7<L>      19 Jun 2020 07:02            CAPILLARY BLOOD GLUCOSE      POCT Blood Glucose.: 143 mg/dL (18 Jun 2020 21:12)  POCT Blood Glucose.: 222 mg/dL (18 Jun 2020 17:13)          Allergies    Cipro (Hives)  coconuts (Hives)  fluoroquinolone antibiotics (Hives)  raspberries &amp; blueberries (Hives)  splenda (Hives)  Vitamin D (Hives)          MEDICATIONS:  Antibiotics:  ceFAZolin   IVPB 2000 milliGRAM(s) IV Intermittent once    Neuro:  acetaminophen   Tablet .. 325 milliGRAM(s) Oral every 4 hours PRN  oxyCODONE    IR 5 milliGRAM(s) Oral every 6 hours PRN  oxyCODONE    IR 10 milliGRAM(s) Oral every 6 hours PRN    Anticoagulation:  eltrombopag 75 milliGRAM(s) Oral daily    OTHER:  dextrose 40% Gel 15 Gram(s) Oral once PRN  dextrose 50% Injectable 12.5 Gram(s) IV Push once  fenofibrate Tablet 145 milliGRAM(s) Oral daily  glucagon  Injectable 1 milliGRAM(s) IntraMuscular once PRN  hydrocortisone sodium succinate Injectable 100 milliGRAM(s) IV Push every 8 hours  insulin lispro (HumaLOG) corrective regimen sliding scale   SubCutaneous at bedtime  insulin lispro (HumaLOG) corrective regimen sliding scale   SubCutaneous three times a day before meals  levothyroxine 300 MICROGram(s) Oral daily  lisinopril 10 milliGRAM(s) Oral daily  pantoprazole    Tablet 40 milliGRAM(s) Oral before breakfast    IVF:  dextrose 5%. 1000 milliLiter(s) IV Continuous <Continuous>  sodium chloride 0.9%. 1000 milliLiter(s) IV Continuous <Continuous>    CULTURES:  none    RADIOLOGY & ADDITIONAL TESTS:  no new imaging HPI:  Patient is a 64 year old female with a history of hypertension, obesity, hyperlipidemia, diabetes, and ITP with T4-T6 compression fractures.  Now presented for kyphoplasty.      OVERNIGHT EVENTS:  No acute events overnight.  Received 1 unit pRBC overnight.  Incisional pain well controlled.  Hematology consulted.    Vital Signs Last 24 Hrs  T(C): 36.7 (19 Jun 2020 05:10), Max: 36.7 (19 Jun 2020 05:10)  T(F): 98.1 (19 Jun 2020 05:10), Max: 98.1 (19 Jun 2020 05:10)  HR: 63 (19 Jun 2020 05:10) (62 - 88)  BP: 102/54 (19 Jun 2020 06:04) (96/58 - 132/61)  BP(mean): 88 (18 Jun 2020 20:00) (70 - 88)  RR: 16 (19 Jun 2020 05:10) (16 - 16)  SpO2: 94% (19 Jun 2020 05:10) (91% - 100%)    I&O's Detail    18 Jun 2020 07:01  -  19 Jun 2020 07:00  --------------------------------------------------------  IN:    Oral Fluid: 300 mL    Packed Red Blood Cells: 350 mL    sodium chloride 0.9%.: 1050 mL  Total IN: 1700 mL    OUT:    Accordian: 115 mL    Accordian: 85 mL    Indwelling Catheter - Urethral: 945 mL  Total OUT: 1145 mL    Total NET: 555 mL        I&O's Summary    18 Jun 2020 07:01  -  19 Jun 2020 07:00  --------------------------------------------------------  IN: 1700 mL / OUT: 1145 mL / NET: 555 mL        PHYSICAL EXAM:  Neurological: awake, alert, oriented x3, follows commands, speech clear and fluent, moves all extremities x4 w/ 5/5 strength throughout, sensation present, intact, equal throughout    Cardiovascular: +s1, s2  Respiratory: clear to auscultation b/l  Gastrointestinal: soft, non-distended, non-tender  Genitourinary: +ramires  Incision/Wound: posterior midline vertical incision c/d/i w/ aqualcel, hemovac x2    TUBES/LINES:  [x] hemovac (115cc and 85cc serosanguinous since OR)  [x] ramires    DIET:  [x] consistent carbohydrate    LABS:                        8.1    12.02 )-----------( 77       ( 19 Jun 2020 07:02 )             27.5     06-19    138  |  106  |  13  ----------------------------<  202<H>  4.3   |  21<L>  |  1.08    Ca    7.7<L>      19 Jun 2020 07:02            CAPILLARY BLOOD GLUCOSE      POCT Blood Glucose.: 143 mg/dL (18 Jun 2020 21:12)  POCT Blood Glucose.: 222 mg/dL (18 Jun 2020 17:13)          Allergies    Cipro (Hives)  coconuts (Hives)  fluoroquinolone antibiotics (Hives)  raspberries &amp; blueberries (Hives)  splenda (Hives)  Vitamin D (Hives)          MEDICATIONS:  Antibiotics:  ceFAZolin   IVPB 2000 milliGRAM(s) IV Intermittent once    Neuro:  acetaminophen   Tablet .. 325 milliGRAM(s) Oral every 4 hours PRN  oxyCODONE    IR 5 milliGRAM(s) Oral every 6 hours PRN  oxyCODONE    IR 10 milliGRAM(s) Oral every 6 hours PRN    Anticoagulation:  eltrombopag 75 milliGRAM(s) Oral daily    OTHER:  dextrose 40% Gel 15 Gram(s) Oral once PRN  dextrose 50% Injectable 12.5 Gram(s) IV Push once  fenofibrate Tablet 145 milliGRAM(s) Oral daily  glucagon  Injectable 1 milliGRAM(s) IntraMuscular once PRN  hydrocortisone sodium succinate Injectable 100 milliGRAM(s) IV Push every 8 hours  insulin lispro (HumaLOG) corrective regimen sliding scale   SubCutaneous at bedtime  insulin lispro (HumaLOG) corrective regimen sliding scale   SubCutaneous three times a day before meals  levothyroxine 300 MICROGram(s) Oral daily  lisinopril 10 milliGRAM(s) Oral daily  pantoprazole    Tablet 40 milliGRAM(s) Oral before breakfast    IVF:  dextrose 5%. 1000 milliLiter(s) IV Continuous <Continuous>  sodium chloride 0.9%. 1000 milliLiter(s) IV Continuous <Continuous>    CULTURES:  none    RADIOLOGY & ADDITIONAL TESTS:  no new imaging

## 2020-06-19 NOTE — CONSULT NOTE ADULT - PROBLEM SELECTOR RECOMMENDATION 9
s/p T4-T6 kyphoplasty on 6/18  - management as per NSGY  - pain control, PT consult  - hemovac management as per NSGY

## 2020-06-20 LAB
ANION GAP SERPL CALC-SCNC: 9 MMOL/L — SIGNIFICANT CHANGE UP (ref 5–17)
BUN SERPL-MCNC: 19 MG/DL — SIGNIFICANT CHANGE UP (ref 7–23)
CA-I BLD-SCNC: 1.17 MMOL/L — SIGNIFICANT CHANGE UP (ref 1.12–1.3)
CALCIUM SERPL-MCNC: 8 MG/DL — LOW (ref 8.4–10.5)
CHLORIDE SERPL-SCNC: 109 MMOL/L — HIGH (ref 96–108)
CO2 SERPL-SCNC: 24 MMOL/L — SIGNIFICANT CHANGE UP (ref 22–31)
CREAT SERPL-MCNC: 1.29 MG/DL — SIGNIFICANT CHANGE UP (ref 0.5–1.3)
GLUCOSE BLDC GLUCOMTR-MCNC: 136 MG/DL — HIGH (ref 70–99)
GLUCOSE BLDC GLUCOMTR-MCNC: 140 MG/DL — HIGH (ref 70–99)
GLUCOSE BLDC GLUCOMTR-MCNC: 188 MG/DL — HIGH (ref 70–99)
GLUCOSE BLDC GLUCOMTR-MCNC: 192 MG/DL — HIGH (ref 70–99)
GLUCOSE SERPL-MCNC: 180 MG/DL — HIGH (ref 70–99)
HCT VFR BLD CALC: 26.1 % — LOW (ref 34.5–45)
HGB BLD-MCNC: 7.8 G/DL — LOW (ref 11.5–15.5)
MCHC RBC-ENTMCNC: 24.1 PG — LOW (ref 27–34)
MCHC RBC-ENTMCNC: 29.9 GM/DL — LOW (ref 32–36)
MCV RBC AUTO: 80.8 FL — SIGNIFICANT CHANGE UP (ref 80–100)
NRBC # BLD: 0 /100 WBCS — SIGNIFICANT CHANGE UP (ref 0–0)
PLATELET # BLD AUTO: 110 K/UL — LOW (ref 150–400)
POTASSIUM SERPL-MCNC: 4.3 MMOL/L — SIGNIFICANT CHANGE UP (ref 3.5–5.3)
POTASSIUM SERPL-SCNC: 4.3 MMOL/L — SIGNIFICANT CHANGE UP (ref 3.5–5.3)
RBC # BLD: 3.23 M/UL — LOW (ref 3.8–5.2)
RBC # FLD: 19.2 % — HIGH (ref 10.3–14.5)
SODIUM SERPL-SCNC: 142 MMOL/L — SIGNIFICANT CHANGE UP (ref 135–145)
WBC # BLD: 11.76 K/UL — HIGH (ref 3.8–10.5)
WBC # FLD AUTO: 11.76 K/UL — HIGH (ref 3.8–10.5)

## 2020-06-20 PROCEDURE — 99233 SBSQ HOSP IP/OBS HIGH 50: CPT

## 2020-06-20 RX ORDER — INSULIN LISPRO 100/ML
7 VIAL (ML) SUBCUTANEOUS
Refills: 0 | Status: DISCONTINUED | OUTPATIENT
Start: 2020-06-20 | End: 2020-06-22

## 2020-06-20 RX ORDER — INSULIN GLARGINE 100 [IU]/ML
22 INJECTION, SOLUTION SUBCUTANEOUS AT BEDTIME
Refills: 0 | Status: DISCONTINUED | OUTPATIENT
Start: 2020-06-20 | End: 2020-06-22

## 2020-06-20 RX ORDER — ENOXAPARIN SODIUM 100 MG/ML
30 INJECTION SUBCUTANEOUS EVERY 12 HOURS
Refills: 0 | Status: DISCONTINUED | OUTPATIENT
Start: 2020-06-20 | End: 2020-06-22

## 2020-06-20 RX ADMIN — SENNA PLUS 2 TABLET(S): 8.6 TABLET ORAL at 22:08

## 2020-06-20 RX ADMIN — ENOXAPARIN SODIUM 30 MILLIGRAM(S): 100 INJECTION SUBCUTANEOUS at 17:50

## 2020-06-20 RX ADMIN — INSULIN GLARGINE 22 UNIT(S): 100 INJECTION, SOLUTION SUBCUTANEOUS at 22:08

## 2020-06-20 RX ADMIN — Medication 5 MILLIGRAM(S): at 05:43

## 2020-06-20 RX ADMIN — POLYETHYLENE GLYCOL 3350 17 GRAM(S): 17 POWDER, FOR SOLUTION ORAL at 11:42

## 2020-06-20 RX ADMIN — LISINOPRIL 10 MILLIGRAM(S): 2.5 TABLET ORAL at 05:42

## 2020-06-20 RX ADMIN — ELTROMBOPAG OLAMINE 75 MILLIGRAM(S): 50 TABLET, FILM COATED ORAL at 05:42

## 2020-06-20 RX ADMIN — Medication 500 MICROGRAM(S): at 05:42

## 2020-06-20 RX ADMIN — OXYCODONE HYDROCHLORIDE 10 MILLIGRAM(S): 5 TABLET ORAL at 13:56

## 2020-06-20 RX ADMIN — Medication 7 UNIT(S): at 12:54

## 2020-06-20 RX ADMIN — Medication 7 UNIT(S): at 16:51

## 2020-06-20 RX ADMIN — Medication 2: at 12:54

## 2020-06-20 RX ADMIN — OXYCODONE HYDROCHLORIDE 10 MILLIGRAM(S): 5 TABLET ORAL at 19:51

## 2020-06-20 RX ADMIN — Medication 2: at 09:14

## 2020-06-20 RX ADMIN — PANTOPRAZOLE SODIUM 40 MILLIGRAM(S): 20 TABLET, DELAYED RELEASE ORAL at 05:43

## 2020-06-20 RX ADMIN — Medication 145 MILLIGRAM(S): at 05:43

## 2020-06-20 NOTE — PROGRESS NOTE ADULT - PROBLEM SELECTOR PLAN 3
on prednisone 5mg daily at home and promacta dosing adjusted weekly by her hematologist.  - s/p  hydrocortisone, stress dosing, x 24 hours  - home prednisone 5mg daily resumed 6/20  - promacta dosing as per hematology  - monitor PLT daily

## 2020-06-20 NOTE — PROGRESS NOTE ADULT - PROBLEM SELECTOR PLAN 2
HbA1c 7.3% on admission. on glipizide-metformin 5-1000mg BID at home.   - FS with suboptimal control in setting of stress dose steroids now back on home prednisone 5mg daily.  - c/w sliding scale insulin  - c/w lantus 20 units qHS - will watch her FS throughout the day, likely will not increase tonight as her insulin requirements will go down not that she is off stress dose steroids  - increased humalog to 7 units qAC ordered, hold if NPO  - goal -180  - monitor FS qAC + qHS, will adjust accordingly HbA1c 7.3% on admission. on glipizide-metformin 5-1000mg BID at home.   - FS with suboptimal control in setting of stress dose steroids now back on home prednisone 5mg daily.  - c/w sliding scale insulin  - increased lantus to 22 units qHS - will likely have to decrease in next few days as off stress dose steroids  - increased humalog to 7 units qAC ordered, hold if NPO  - goal -180  - monitor FS qAC + qHS, will adjust accordingly

## 2020-06-20 NOTE — PROGRESS NOTE ADULT - SUBJECTIVE AND OBJECTIVE BOX
[facility] Division of Hospital Medicine  Tatiana Aquino MD  Pager (M-F, 8A-5P): [phone]  Other Times:  [phone]      Patient is a 64y old  Female who presents with a chief complaint of thoracic compression fracture (19 Jun 2020 14:18)      SUBJECTIVE / OVERNIGHT EVENTS: no acute events overnight. still with drainage from hemovacs. FS improved after starting basal/bolus insulin. no complaints today. just mild discomfort in upper back post-op but well controlled. last BM yesterday.  ADDITIONAL REVIEW OF SYSTEMS:    MEDICATIONS  (STANDING):  ceFAZolin   IVPB 2000 milliGRAM(s) IV Intermittent once  dextrose 5%. 1000 milliLiter(s) (50 mL/Hr) IV Continuous <Continuous>  dextrose 50% Injectable 12.5 Gram(s) IV Push once  eltrombopag 75 milliGRAM(s) Oral daily  enoxaparin Injectable 30 milliGRAM(s) SubCutaneous every 12 hours  fenofibrate Tablet 145 milliGRAM(s) Oral daily  insulin glargine Injectable (LANTUS) 20 Unit(s) SubCutaneous at bedtime  insulin lispro (HumaLOG) corrective regimen sliding scale   SubCutaneous at bedtime  insulin lispro (HumaLOG) corrective regimen sliding scale   SubCutaneous three times a day before meals  insulin lispro Injectable (HumaLOG) 7 Unit(s) SubCutaneous three times a day with meals  levothyroxine 500 MICROGram(s) Oral daily  lisinopril 10 milliGRAM(s) Oral daily  pantoprazole    Tablet 40 milliGRAM(s) Oral before breakfast  polyethylene glycol 3350 17 Gram(s) Oral daily  predniSONE   Tablet 5 milliGRAM(s) Oral daily  senna 2 Tablet(s) Oral at bedtime  sodium chloride 0.9%. 1000 milliLiter(s) (75 mL/Hr) IV Continuous <Continuous>    MEDICATIONS  (PRN):  acetaminophen   Tablet .. 325 milliGRAM(s) Oral every 4 hours PRN Temp greater or equal to 38C (100.4F), Mild Pain (1 - 3)  dextrose 40% Gel 15 Gram(s) Oral once PRN Blood Glucose LESS THAN 70 milliGRAM(s)/deciliter  glucagon  Injectable 1 milliGRAM(s) IntraMuscular once PRN Glucose LESS THAN 70 milligrams/deciliter  oxyCODONE    IR 5 milliGRAM(s) Oral every 6 hours PRN Moderate Pain (4 - 6)  oxyCODONE    IR 10 milliGRAM(s) Oral every 6 hours PRN Severe Pain (7 - 10)      CAPILLARY BLOOD GLUCOSE      POCT Blood Glucose.: 188 mg/dL (20 Jun 2020 12:50)  POCT Blood Glucose.: 192 mg/dL (20 Jun 2020 08:59)  POCT Blood Glucose.: 348 mg/dL (19 Jun 2020 21:36)  POCT Blood Glucose.: 244 mg/dL (19 Jun 2020 17:36)    I&O's Summary    19 Jun 2020 07:01  -  20 Jun 2020 07:00  --------------------------------------------------------  IN: 780 mL / OUT: 200 mL / NET: 580 mL    20 Jun 2020 07:01  -  20 Jun 2020 13:28  --------------------------------------------------------  IN: 360 mL / OUT: 85 mL / NET: 275 mL        PHYSICAL EXAM:  Vital Signs Last 24 Hrs  T(C): 36.8 (20 Jun 2020 13:02), Max: 36.9 (20 Jun 2020 04:55)  T(F): 98.3 (20 Jun 2020 13:02), Max: 98.4 (20 Jun 2020 04:55)  HR: 76 (20 Jun 2020 13:02) (62 - 78)  BP: 117/53 (20 Jun 2020 13:02) (117/53 - 148/89)  BP(mean): --  RR: 18 (20 Jun 2020 13:02) (16 - 18)  SpO2: 94% (20 Jun 2020 13:02) (93% - 96%)    CONSTITUTIONAL: NAD, well-developed  EYES: PERRLA; conjunctiva and sclera clear  ENMT: Moist oral mucosa, no pharyngeal injection or exudates; normal dentition  NECK: Supple, no palpable masses; no thyromegaly  RESPIRATORY: Normal respiratory effort; lungs are clear to auscultation bilaterally, no wheeze nor crackles  CARDIOVASCULAR: Regular rate and rhythm, normal S1 and S2, no murmur/rub/gallop; No lower extremity edema; Peripheral pulses are 2+ bilaterally  ABDOMEN: Soft, Nondistended,  Nontender to palpation, normoactive bowel sounds  MUSCULOSKELETAL:  No clubbing or cyanosis of digits; no joint swelling or tenderness to palpation  PSYCH: A+O to person, place, and time; affect appropriate  NEUROLOGY: CN 2-12 are intact and symmetric; no gross sensory deficits, 5/5 strength in all extremities  SKIN: +thoracic surgical site c/d/i with 2 hemovacs with serosanguinous drainage    LABS:                        7.8    11.76 )-----------( 110      ( 20 Jun 2020 06:09 )             26.1     06-20    142  |  109<H>  |  19  ----------------------------<  180<H>  4.3   |  24  |  1.29    Ca    8.0<L>      20 Jun 2020 06:09    TPro  x   /  Alb  3.4  /  TBili  x   /  DBili  x   /  AST  x   /  ALT  x   /  AlkPhos  x   06-19        RADIOLOGY & ADDITIONAL TESTS:  Results Reviewed:  leukocytosis downtrending, H/H stable at 7.8 from 8.1, PLT improved from 77K to 110K. Ca and iCa wnl.   Imaging Personally Reviewed:  Electrocardiogram Personally Reviewed:    COORDINATION OF CARE:  Care Discussed with Consultants/Other Providers [Y]: Neurosurgery SAVITA Cummins  Prior or Outpatient Records Reviewed [Y/N]:

## 2020-06-20 NOTE — PROGRESS NOTE ADULT - SUBJECTIVE AND OBJECTIVE BOX
HPI:  Patient is a 64 year old female with a history of hypertension, obesity, hyperlipidemia, diabetes, and ITP with T4-T6 compression fractures.  Now presented for kyphoplasty.    OVERNIGHT EVENTS:  No acute events overnight.  Patient seen by hematology, planned to start home dose prednisone today.  Hospitalist saw for glucose control, lantus started overnight.  Incisional pain well controlled.      Vital Signs Last 24 Hrs  T(C): 36.9 (20 Jun 2020 04:55), Max: 36.9 (20 Jun 2020 04:55)  T(F): 98.4 (20 Jun 2020 04:55), Max: 98.4 (20 Jun 2020 04:55)  HR: 62 (20 Jun 2020 04:55) (61 - 78)  BP: 143/75 (20 Jun 2020 04:55) (103/60 - 144/74)  BP(mean): --  RR: 18 (20 Jun 2020 04:55) (16 - 18)  SpO2: 94% (20 Jun 2020 04:55) (93% - 96%)    I&O's Detail    18 Jun 2020 07:01  -  19 Jun 2020 07:00  --------------------------------------------------------  IN:    Oral Fluid: 300 mL    Packed Red Blood Cells: 350 mL    sodium chloride 0.9%.: 1050 mL  Total IN: 1700 mL    OUT:    Accordian: 115 mL    Accordian: 85 mL    Indwelling Catheter - Urethral: 945 mL  Total OUT: 1145 mL    Total NET: 555 mL      19 Jun 2020 07:01  -  20 Jun 2020 06:23  --------------------------------------------------------  IN:    Oral Fluid: 780 mL  Total IN: 780 mL    OUT:    Accordian: 120 mL    Accordian: 80 mL  Total OUT: 200 mL    Total NET: 580 mL        I&O's Summary    18 Jun 2020 07:01  -  19 Jun 2020 07:00  --------------------------------------------------------  IN: 1700 mL / OUT: 1145 mL / NET: 555 mL    19 Jun 2020 07:01  -  20 Jun 2020 06:23  --------------------------------------------------------  IN: 780 mL / OUT: 200 mL / NET: 580 mL        PHYSICAL EXAM:  Neurological:     Cardiovascular: +s1, s2  Respiratory: clear to auscultation b/l  Gastrointestinal: soft, non-distended, non-tender  Genitourinary: +voiding  Incision/Wound:     TUBES/LINES:  [x] hemovac (115cc and 85cc serosanguinous since OR)      DIET:  [x] consistent carbohydrate    LABS:                        7.8    11.76 )-----------( x        ( 20 Jun 2020 06:09 )             26.1     06-19    138  |  106  |  13  ----------------------------<  202<H>  4.3   |  21<L>  |  1.08    Ca    7.7<L>      19 Jun 2020 07:02    TPro  x   /  Alb  3.4  /  TBili  x   /  DBili  x   /  AST  x   /  ALT  x   /  AlkPhos  x   06-19            CAPILLARY BLOOD GLUCOSE      POCT Blood Glucose.: 348 mg/dL (19 Jun 2020 21:36)  POCT Blood Glucose.: 244 mg/dL (19 Jun 2020 17:36)  POCT Blood Glucose.: 254 mg/dL (19 Jun 2020 12:27)  POCT Blood Glucose.: 223 mg/dL (19 Jun 2020 09:16)            Allergies    Cipro (Hives)  coconuts (Hives)  fluoroquinolone antibiotics (Hives)  raspberries &amp; blueberries (Hives)  splenda (Hives)  Vitamin D (Hives)        MEDICATIONS:  Antibiotics:  ceFAZolin   IVPB 2000 milliGRAM(s) IV Intermittent once    Neuro:  acetaminophen   Tablet .. 325 milliGRAM(s) Oral every 4 hours PRN  oxyCODONE    IR 5 milliGRAM(s) Oral every 6 hours PRN  oxyCODONE    IR 10 milliGRAM(s) Oral every 6 hours PRN    Anticoagulation:  eltrombopag 75 milliGRAM(s) Oral daily    OTHER:  dextrose 40% Gel 15 Gram(s) Oral once PRN  dextrose 50% Injectable 12.5 Gram(s) IV Push once  fenofibrate Tablet 145 milliGRAM(s) Oral daily  glucagon  Injectable 1 milliGRAM(s) IntraMuscular once PRN  insulin glargine Injectable (LANTUS) 20 Unit(s) SubCutaneous at bedtime  insulin lispro (HumaLOG) corrective regimen sliding scale   SubCutaneous at bedtime  insulin lispro (HumaLOG) corrective regimen sliding scale   SubCutaneous three times a day before meals  insulin lispro Injectable (HumaLOG) 5 Unit(s) SubCutaneous three times a day with meals  levothyroxine 500 MICROGram(s) Oral daily  lisinopril 10 milliGRAM(s) Oral daily  pantoprazole    Tablet 40 milliGRAM(s) Oral before breakfast  polyethylene glycol 3350 17 Gram(s) Oral daily  predniSONE   Tablet 5 milliGRAM(s) Oral daily  senna 2 Tablet(s) Oral at bedtime    IVF:  dextrose 5%. 1000 milliLiter(s) IV Continuous <Continuous>  sodium chloride 0.9%. 1000 milliLiter(s) IV Continuous <Continuous>    CULTURES:  none    RADIOLOGY & ADDITIONAL TESTS:  no new imaging HPI:  Patient is a 64 year old female with a history of hypertension, obesity, hyperlipidemia, diabetes, and ITP with T4-T6 compression fractures.  Now presented for kyphoplasty.    OVERNIGHT EVENTS:  No acute events overnight.  Patient seen by hematology, planned to start home dose prednisone today.  Hospitalist saw for glucose control, lantus started overnight.  Incisional pain well controlled.      Vital Signs Last 24 Hrs  T(C): 36.9 (20 Jun 2020 04:55), Max: 36.9 (20 Jun 2020 04:55)  T(F): 98.4 (20 Jun 2020 04:55), Max: 98.4 (20 Jun 2020 04:55)  HR: 62 (20 Jun 2020 04:55) (61 - 78)  BP: 143/75 (20 Jun 2020 04:55) (103/60 - 144/74)  BP(mean): --  RR: 18 (20 Jun 2020 04:55) (16 - 18)  SpO2: 94% (20 Jun 2020 04:55) (93% - 96%)    I&O's Detail    18 Jun 2020 07:01  -  19 Jun 2020 07:00  --------------------------------------------------------  IN:    Oral Fluid: 300 mL    Packed Red Blood Cells: 350 mL    sodium chloride 0.9%.: 1050 mL  Total IN: 1700 mL    OUT:    Accordian: 115 mL    Accordian: 85 mL    Indwelling Catheter - Urethral: 945 mL  Total OUT: 1145 mL    Total NET: 555 mL      19 Jun 2020 07:01  -  20 Jun 2020 06:23  --------------------------------------------------------  IN:    Oral Fluid: 780 mL  Total IN: 780 mL    OUT:    Accordian: 120 mL    Accordian: 80 mL  Total OUT: 200 mL    Total NET: 580 mL        I&O's Summary    18 Jun 2020 07:01  -  19 Jun 2020 07:00  --------------------------------------------------------  IN: 1700 mL / OUT: 1145 mL / NET: 555 mL    19 Jun 2020 07:01  -  20 Jun 2020 06:23  --------------------------------------------------------  IN: 780 mL / OUT: 200 mL / NET: 580 mL        PHYSICAL EXAM:  Neurological: awake, alert, oriented x3, follows commands, speech clear and fluent, moves all extremities x4 w/ 5/5 strength throughout, sensation present, intact, equal throughout    Cardiovascular: +s1, s2  Respiratory: clear to auscultation b/l  Gastrointestinal: soft, non-distended, non-tender  Genitourinary: +voiding  Incision/Wound: posterior midline vertical incision c/d/i w/ aquacel, hemovac x2    TUBES/LINES:  [x] hemovac (120cc and 80cc serosanguinous since OR)      DIET:  [x] consistent carbohydrate    LABS:                        7.8    11.76 )-----------( x        ( 20 Jun 2020 06:09 )             26.1     06-19    138  |  106  |  13  ----------------------------<  202<H>  4.3   |  21<L>  |  1.08    Ca    7.7<L>      19 Jun 2020 07:02    TPro  x   /  Alb  3.4  /  TBili  x   /  DBili  x   /  AST  x   /  ALT  x   /  AlkPhos  x   06-19            CAPILLARY BLOOD GLUCOSE      POCT Blood Glucose.: 348 mg/dL (19 Jun 2020 21:36)  POCT Blood Glucose.: 244 mg/dL (19 Jun 2020 17:36)  POCT Blood Glucose.: 254 mg/dL (19 Jun 2020 12:27)  POCT Blood Glucose.: 223 mg/dL (19 Jun 2020 09:16)            Allergies    Cipro (Hives)  coconuts (Hives)  fluoroquinolone antibiotics (Hives)  raspberries &amp; blueberries (Hives)  splenda (Hives)  Vitamin D (Hives)        MEDICATIONS:  Antibiotics:  ceFAZolin   IVPB 2000 milliGRAM(s) IV Intermittent once    Neuro:  acetaminophen   Tablet .. 325 milliGRAM(s) Oral every 4 hours PRN  oxyCODONE    IR 5 milliGRAM(s) Oral every 6 hours PRN  oxyCODONE    IR 10 milliGRAM(s) Oral every 6 hours PRN    Anticoagulation:  eltrombopag 75 milliGRAM(s) Oral daily    OTHER:  dextrose 40% Gel 15 Gram(s) Oral once PRN  dextrose 50% Injectable 12.5 Gram(s) IV Push once  fenofibrate Tablet 145 milliGRAM(s) Oral daily  glucagon  Injectable 1 milliGRAM(s) IntraMuscular once PRN  insulin glargine Injectable (LANTUS) 20 Unit(s) SubCutaneous at bedtime  insulin lispro (HumaLOG) corrective regimen sliding scale   SubCutaneous at bedtime  insulin lispro (HumaLOG) corrective regimen sliding scale   SubCutaneous three times a day before meals  insulin lispro Injectable (HumaLOG) 5 Unit(s) SubCutaneous three times a day with meals  levothyroxine 500 MICROGram(s) Oral daily  lisinopril 10 milliGRAM(s) Oral daily  pantoprazole    Tablet 40 milliGRAM(s) Oral before breakfast  polyethylene glycol 3350 17 Gram(s) Oral daily  predniSONE   Tablet 5 milliGRAM(s) Oral daily  senna 2 Tablet(s) Oral at bedtime    IVF:  dextrose 5%. 1000 milliLiter(s) IV Continuous <Continuous>  sodium chloride 0.9%. 1000 milliLiter(s) IV Continuous <Continuous>    CULTURES:  none    RADIOLOGY & ADDITIONAL TESTS:  no new imaging

## 2020-06-20 NOTE — PROGRESS NOTE ADULT - ASSESSMENT
64 year old female with PMH of obesity, HTN, HLD, T2DM (A1c 7.3%), ITP diagnosed 1/2019 on prednisone and Promacta, hypothyroidism, and T4, T5, T6 compression fractures now s/p T4-T6 kyphoplasty on 6/18. Medicine consulted for hyperglycemia now improved with basal/bolus insulin.

## 2020-06-20 NOTE — PROGRESS NOTE ADULT - ASSESSMENT
HPI:  Patient is a 64 year old female with a history of hypertension, obesity, hyperlipidemia, diabetes, and ITP with T4-T6 compression fractures.  Now presented for kyphoplasty.      PROCEDURE: s/p T4-T6 kyphoplasties on 6/18/2020  POD#2    PLAN:  Neuro:   -q4 hour neuro checks  -oxycodone and tylenol for pain control  -out of bed with assistance  -pt - outpatient pt upon discharge, DME to be determined    Respiratory:   -keep O2 sat > 94%  -incentive spirometer for lung expansion    CV:  -keep sbp 100-140  -lisinopril for bp control  -fenfibrate for lipid control    Endocrine:   -lantus, pre-meal lispro, and humalog sliding scale for glucose control  -synthroid adjusted to patient's home dose (500mcg po daily)  -keep fingersticks 140-180    Heme/Onc:   -history of ITP, hydrocortisone 100q8 x24 hours completed, start prednisone 5mg po daily today  -hematology saw, recs appreciated  -continue promacta    Renal:   -voiding    ID:   -wbc slightly elevated, likely from stress dose steroids/OR, has been afebrile, now downtrending  -trend cbc    GI:   -consistent carbohydrate diet  -protonix for gi prophylaxis  -senna and miralax for bowel regimen      Spectra #[id number] HPI:  Patient is a 64 year old female with a history of hypertension, obesity, hyperlipidemia, diabetes, and ITP with T4-T6 compression fractures.  Now presented for kyphoplasty.      PROCEDURE: s/p T4-T6 kyphoplasties on 6/18/2020  POD#2    PLAN:  Neuro:   -q4 hour neuro checks  -oxycodone and tylenol for pain control  -continue hemovacs, monitor outputs  -out of bed with assistance  -pt - outpatient pt upon discharge, DME to be determined    Respiratory:   -keep O2 sat > 94%  -incentive spirometer for lung expansion    CV:  -keep sbp 100-140  -lisinopril for bp control  -fenfibrate for lipid control    Endocrine:   -lantus, pre-meal lispro, and humalog sliding scale for glucose control  -synthroid adjusted to patient's home dose (500mcg po daily)  -keep fingersticks 140-180    Heme/Onc:   -history of ITP, hydrocortisone 100q8 x24 hours completed, start prednisone 5mg po daily today  -hematology saw, recs appreciated  -continue promacta    Renal:   -voiding    ID:   -wbc slightly elevated, likely from stress dose steroids/OR, has been afebrile, now downtrending  -trend cbc    GI:   -consistent carbohydrate diet  -protonix for gi prophylaxis  -senna and miralax for bowel regimen      Spectra #[id number] HPI:  Patient is a 64 year old female with a history of hypertension, obesity, hyperlipidemia, diabetes, and ITP with T4-T6 compression fractures.  Now presented for kyphoplasty.      PROCEDURE: s/p T4-T6 kyphoplasties on 6/18/2020  POD#2    PLAN:  Neuro:   -q4 hour neuro checks  -oxycodone and tylenol for pain control  -continue hemovacs, monitor outputs  -out of bed with assistance  -pt - outpatient pt upon discharge, DME to be determined    Respiratory:   -keep O2 sat > 94%  -incentive spirometer for lung expansion    CV:  -keep sbp 100-140  -lisinopril for bp control  -fenfibrate for lipid control    Endocrine:   -lantus, pre-meal lispro, and humalog sliding scale for glucose control  -synthroid adjusted to patient's home dose (500mcg po daily)  -keep fingersticks 140-180    Heme/Onc:   -history of ITP, hydrocortisone 100q8 x24 hours completed, start prednisone 5mg po daily today  -hematology saw, recs appreciated  -continue promacta  -platelets 110 today, hemoglobin stable  -lovenox and SCDs for DVT prophylaxis    Renal:   -voiding    ID:   -wbc slightly elevated, likely from stress dose steroids/OR, has been afebrile, now downtrending  -trend cbc    GI:   -consistent carbohydrate diet  -protonix for gi prophylaxis  -senna and miralax for bowel regimen      Spectra #[id number]

## 2020-06-21 ENCOUNTER — TRANSCRIPTION ENCOUNTER (OUTPATIENT)
Age: 65
End: 2020-06-21

## 2020-06-21 LAB
ANION GAP SERPL CALC-SCNC: 8 MMOL/L — SIGNIFICANT CHANGE UP (ref 5–17)
BUN SERPL-MCNC: 24 MG/DL — HIGH (ref 7–23)
CALCIUM SERPL-MCNC: 8.2 MG/DL — LOW (ref 8.4–10.5)
CHLORIDE SERPL-SCNC: 108 MMOL/L — SIGNIFICANT CHANGE UP (ref 96–108)
CO2 SERPL-SCNC: 26 MMOL/L — SIGNIFICANT CHANGE UP (ref 22–31)
CREAT SERPL-MCNC: 1.18 MG/DL — SIGNIFICANT CHANGE UP (ref 0.5–1.3)
GLUCOSE BLDC GLUCOMTR-MCNC: 165 MG/DL — HIGH (ref 70–99)
GLUCOSE BLDC GLUCOMTR-MCNC: 167 MG/DL — HIGH (ref 70–99)
GLUCOSE BLDC GLUCOMTR-MCNC: 168 MG/DL — HIGH (ref 70–99)
GLUCOSE BLDC GLUCOMTR-MCNC: 95 MG/DL — SIGNIFICANT CHANGE UP (ref 70–99)
GLUCOSE SERPL-MCNC: 116 MG/DL — HIGH (ref 70–99)
HCT VFR BLD CALC: 26.6 % — LOW (ref 34.5–45)
HGB BLD-MCNC: 8 G/DL — LOW (ref 11.5–15.5)
MCHC RBC-ENTMCNC: 24.6 PG — LOW (ref 27–34)
MCHC RBC-ENTMCNC: 30.1 GM/DL — LOW (ref 32–36)
MCV RBC AUTO: 81.8 FL — SIGNIFICANT CHANGE UP (ref 80–100)
NRBC # BLD: 0 /100 WBCS — SIGNIFICANT CHANGE UP (ref 0–0)
PLATELET # BLD AUTO: 115 K/UL — LOW (ref 150–400)
POTASSIUM SERPL-MCNC: 4.2 MMOL/L — SIGNIFICANT CHANGE UP (ref 3.5–5.3)
POTASSIUM SERPL-SCNC: 4.2 MMOL/L — SIGNIFICANT CHANGE UP (ref 3.5–5.3)
RBC # BLD: 3.25 M/UL — LOW (ref 3.8–5.2)
RBC # FLD: 19.5 % — HIGH (ref 10.3–14.5)
SARS-COV-2 IGG SERPL QL IA: NEGATIVE — SIGNIFICANT CHANGE UP
SARS-COV-2 IGM SERPL IA-ACNC: 0.1 INDEX — SIGNIFICANT CHANGE UP
SODIUM SERPL-SCNC: 142 MMOL/L — SIGNIFICANT CHANGE UP (ref 135–145)
WBC # BLD: 8.64 K/UL — SIGNIFICANT CHANGE UP (ref 3.8–10.5)
WBC # FLD AUTO: 8.64 K/UL — SIGNIFICANT CHANGE UP (ref 3.8–10.5)

## 2020-06-21 PROCEDURE — 99232 SBSQ HOSP IP/OBS MODERATE 35: CPT

## 2020-06-21 RX ORDER — ONDANSETRON 8 MG/1
4 TABLET, FILM COATED ORAL EVERY 6 HOURS
Refills: 0 | Status: DISCONTINUED | OUTPATIENT
Start: 2020-06-21 | End: 2020-06-22

## 2020-06-21 RX ORDER — LOPERAMIDE HCL 2 MG
2 TABLET ORAL ONCE
Refills: 0 | Status: COMPLETED | OUTPATIENT
Start: 2020-06-21 | End: 2020-06-21

## 2020-06-21 RX ORDER — INSULIN LISPRO 100/ML
3 VIAL (ML) SUBCUTANEOUS ONCE
Refills: 0 | Status: COMPLETED | OUTPATIENT
Start: 2020-06-21 | End: 2020-06-21

## 2020-06-21 RX ADMIN — ENOXAPARIN SODIUM 30 MILLIGRAM(S): 100 INJECTION SUBCUTANEOUS at 06:03

## 2020-06-21 RX ADMIN — Medication 2 MILLIGRAM(S): at 11:32

## 2020-06-21 RX ADMIN — ELTROMBOPAG OLAMINE 75 MILLIGRAM(S): 50 TABLET, FILM COATED ORAL at 06:03

## 2020-06-21 RX ADMIN — Medication 7 UNIT(S): at 12:51

## 2020-06-21 RX ADMIN — ENOXAPARIN SODIUM 30 MILLIGRAM(S): 100 INJECTION SUBCUTANEOUS at 17:13

## 2020-06-21 RX ADMIN — Medication 7 UNIT(S): at 09:38

## 2020-06-21 RX ADMIN — Medication 500 MICROGRAM(S): at 06:03

## 2020-06-21 RX ADMIN — Medication 3 UNIT(S): at 17:56

## 2020-06-21 RX ADMIN — Medication 2: at 09:38

## 2020-06-21 RX ADMIN — Medication 5 MILLIGRAM(S): at 06:03

## 2020-06-21 RX ADMIN — ONDANSETRON 4 MILLIGRAM(S): 8 TABLET, FILM COATED ORAL at 08:06

## 2020-06-21 RX ADMIN — Medication 145 MILLIGRAM(S): at 06:03

## 2020-06-21 RX ADMIN — OXYCODONE HYDROCHLORIDE 10 MILLIGRAM(S): 5 TABLET ORAL at 20:22

## 2020-06-21 RX ADMIN — PANTOPRAZOLE SODIUM 40 MILLIGRAM(S): 20 TABLET, DELAYED RELEASE ORAL at 06:03

## 2020-06-21 RX ADMIN — LISINOPRIL 10 MILLIGRAM(S): 2.5 TABLET ORAL at 06:03

## 2020-06-21 RX ADMIN — Medication 2: at 12:51

## 2020-06-21 RX ADMIN — OXYCODONE HYDROCHLORIDE 10 MILLIGRAM(S): 5 TABLET ORAL at 11:34

## 2020-06-21 RX ADMIN — INSULIN GLARGINE 22 UNIT(S): 100 INJECTION, SOLUTION SUBCUTANEOUS at 22:13

## 2020-06-21 NOTE — PROGRESS NOTE ADULT - PROBLEM SELECTOR PLAN 2
HbA1c 7.3% on admission. on glipizide-metformin 5-1000mg BID at home.   - FS with suboptimal control in setting of stress dose steroids now back on home prednisone 5mg daily - FS now improved and at goal.  - c/w sliding scale insulin  - continue lantus to 22 units qHS - will likely have to decrease in next few days as off stress dose steroids if she remains in the hospital  - continue humalog to 7 units qAC ordered, hold if NPO  - goal -180  - monitor FS qAC + qHS, will adjust accordingly

## 2020-06-21 NOTE — DISCHARGE NOTE PROVIDER - NSDCCPCAREPLAN_GEN_ALL_CORE_FT
PRINCIPAL DISCHARGE DIAGNOSIS  Diagnosis: Closed wedge compression fracture of thoracic vertebra  Assessment and Plan of Treatment: 6/18 s/p T4-T6 kyphoplasties, follow up with Dr Porter in 7-10 days      SECONDARY DISCHARGE DIAGNOSES  Diagnosis: Type 2 diabetes mellitus  Assessment and Plan of Treatment: Conitinue your home medication for diabetes    Diagnosis: Chronic ITP (idiopathic thrombocytopenia)  Assessment and Plan of Treatment: Platelet level is stable. Continue prednisone and eltrombopag. Follow up with your Hematologist as directed.    Diagnosis: Hypertension  Assessment and Plan of Treatment: Continue lisinopril. Make an appointment for follow up with your PCP in 1 week.

## 2020-06-21 NOTE — DISCHARGE NOTE PROVIDER - NSDCMRMEDTOKEN_GEN_ALL_CORE_FT
ezetimibe 10 mg oral tablet: 1 tab(s) orally once a day  fenofibrate 134 mg oral capsule: 1 cap(s) orally once a day  glipizide-metformin 5 mg-500 mg oral tablet: 1 tab(s) orally 2 times a day   omeprazole 40 mg oral delayed release capsule: 1 cap(s) orally once a day  predniSONE 5 mg oral tablet: 1 tab(s) orally once a day  Promacta 75 mg oral tablet: 1 tab(s) orally once a day  quinapril 10 mg oral tablet: 1 tab(s) orally once a day  Rolling Walker: use as directed    dx: s/p kyphoplasty  Tirosint 100 mcg (0.1 mg) oral capsule: 5 cap(s) orally once a day (patient states she takes 5 capsules of the 100mcg qd. Rx from 11/18 was for 90 days of 4 capsules qd) acetaminophen 325 mg oral tablet: 1 tab(s) orally every 4 hours, As needed, Mild Pain (1 - 3)  ezetimibe 10 mg oral tablet: 1 tab(s) orally once a day  fenofibrate 134 mg oral capsule: 1 cap(s) orally once a day  glipizide-metformin 5 mg-500 mg oral tablet: 1 tab(s) orally 2 times a day   omeprazole 40 mg oral delayed release capsule: 1 cap(s) orally once a day  oxyCODONE 10 mg oral tablet: 1 tab(s) orally every 6 hours, As needed, Severe Pain (7 - 10) MDD:4 tablets  predniSONE 5 mg oral tablet: 1 tab(s) orally once a day  Promacta 75 mg oral tablet: 1 tab(s) orally once a day  quinapril 10 mg oral tablet: 1 tab(s) orally once a day  Rolling Walker: use as directed    dx: s/p kyphoplasty  Tirosint 100 mcg (0.1 mg) oral capsule: 5 cap(s) orally once a day (patient states she takes 5 capsules of the 100mcg qd. Rx from 11/18 was for 90 days of 4 capsules qd)

## 2020-06-21 NOTE — DISCHARGE NOTE PROVIDER - CARE PROVIDER_API CALL
Angelo Porter  NEUROSURGERY  [address] [place] [address]  Phone: [phone]  Fax: [phone]  Follow Up Time: 1 week

## 2020-06-21 NOTE — PROGRESS NOTE ADULT - SUBJECTIVE AND OBJECTIVE BOX
[facility] Division of Hospital Medicine  Tatiana Aquino MD  Pager (ANDREW-NANCY, 5M-8G): [phone]  Other Times:  [phone]      Patient is a 64y old  Female who presents with a chief complaint of Back pain (21 Jun 2020 10:29)      SUBJECTIVE / OVERNIGHT EVENTS: overnight, one of her hemovacs removed. otherwise feels well with pain well-controlled had bowel movements today. FS at goal with current regimen as well.    ADDITIONAL REVIEW OF SYSTEMS:    MEDICATIONS  (STANDING):  ceFAZolin   IVPB 2000 milliGRAM(s) IV Intermittent once  dextrose 5%. 1000 milliLiter(s) (50 mL/Hr) IV Continuous <Continuous>  dextrose 50% Injectable 12.5 Gram(s) IV Push once  eltrombopag 75 milliGRAM(s) Oral daily  enoxaparin Injectable 30 milliGRAM(s) SubCutaneous every 12 hours  fenofibrate Tablet 145 milliGRAM(s) Oral daily  insulin glargine Injectable (LANTUS) 22 Unit(s) SubCutaneous at bedtime  insulin lispro (HumaLOG) corrective regimen sliding scale   SubCutaneous at bedtime  insulin lispro (HumaLOG) corrective regimen sliding scale   SubCutaneous three times a day before meals  insulin lispro Injectable (HumaLOG) 7 Unit(s) SubCutaneous three times a day with meals  levothyroxine 500 MICROGram(s) Oral daily  lisinopril 10 milliGRAM(s) Oral daily  pantoprazole    Tablet 40 milliGRAM(s) Oral before breakfast  predniSONE   Tablet 5 milliGRAM(s) Oral daily    MEDICATIONS  (PRN):  acetaminophen   Tablet .. 325 milliGRAM(s) Oral every 4 hours PRN Temp greater or equal to 38C (100.4F), Mild Pain (1 - 3)  dextrose 40% Gel 15 Gram(s) Oral once PRN Blood Glucose LESS THAN 70 milliGRAM(s)/deciliter  glucagon  Injectable 1 milliGRAM(s) IntraMuscular once PRN Glucose LESS THAN 70 milligrams/deciliter  ondansetron Injectable 4 milliGRAM(s) IV Push every 6 hours PRN Nausea and/or Vomiting  oxyCODONE    IR 5 milliGRAM(s) Oral every 6 hours PRN Moderate Pain (4 - 6)  oxyCODONE    IR 10 milliGRAM(s) Oral every 6 hours PRN Severe Pain (7 - 10)      CAPILLARY BLOOD GLUCOSE      POCT Blood Glucose.: 167 mg/dL (21 Jun 2020 08:48)  POCT Blood Glucose.: 136 mg/dL (20 Jun 2020 21:53)  POCT Blood Glucose.: 140 mg/dL (20 Jun 2020 16:40)  POCT Blood Glucose.: 188 mg/dL (20 Jun 2020 12:50)    I&O's Summary    20 Jun 2020 07:01  -  21 Jun 2020 07:00  --------------------------------------------------------  IN: 1180 mL / OUT: 180 mL / NET: 1000 mL    21 Jun 2020 07:01  -  21 Jun 2020 11:56  --------------------------------------------------------  IN: 240 mL / OUT: 0 mL / NET: 240 mL        PHYSICAL EXAM:  Vital Signs Last 24 Hrs  T(C): 37.1 (21 Jun 2020 08:34), Max: 37.1 (21 Jun 2020 08:34)  T(F): 98.7 (21 Jun 2020 08:34), Max: 98.7 (21 Jun 2020 08:34)  HR: 66 (21 Jun 2020 08:34) (61 - 76)  BP: 113/75 (21 Jun 2020 08:34) (94/56 - 149/66)  BP(mean): --  RR: 16 (21 Jun 2020 08:34) (16 - 18)  SpO2: 95% (21 Jun 2020 08:34) (93% - 95%)    CONSTITUTIONAL: NAD, well-developed  EYES: PERRLA; conjunctiva and sclera clear  ENMT: Moist oral mucosa, no pharyngeal injection or exudates; normal dentition  NECK: Supple, no palpable masses; no thyromegaly  RESPIRATORY: Normal respiratory effort; lungs are clear to auscultation bilaterally, no wheeze nor crackles  CARDIOVASCULAR: Regular rate and rhythm, normal S1 and S2, no murmur/rub/gallop; No lower extremity edema; Peripheral pulses are 2+ bilaterally  ABDOMEN: Soft, Nondistended,  Nontender to palpation, normoactive bowel sounds  MUSCULOSKELETAL:  No clubbing or cyanosis of digits; no joint swelling or tenderness to palpation  PSYCH: A+O to person, place, and time; affect appropriate  NEUROLOGY: CN 2-12 are intact and symmetric; no gross sensory deficits, 5/5 strength in all extremities  SKIN: +thoracic surgical site c/d/i with 1 hemovac in place with serosanguinous drainage    LABS:                        8.0    8.64  )-----------( 115      ( 21 Jun 2020 06:00 )             26.6     06-21    142  |  108  |  24<H>  ----------------------------<  116<H>  4.2   |  26  |  1.18    Ca    8.2<L>      21 Jun 2020 06:00        RADIOLOGY & ADDITIONAL TESTS:  Results Reviewed:  no leukocytosis, hyperglycemia resolved, H/H stable, PLT improved to 115K today  Imaging Personally Reviewed:  Electrocardiogram Personally Reviewed:    COORDINATION OF CARE:  Care Discussed with Consultants/Other Providers [Y]: Neurosurgery [facility] Delvalle  Prior or Outpatient Records Reviewed [Y/N]:

## 2020-06-21 NOTE — DISCHARGE NOTE PROVIDER - HOSPITAL COURSE
63 y/o female with h/o HTN, obesity, GERD, HLD, DM2, ITP diagnosed in 02/2019 on Prednisone and Promacta with platelets greatly fluctuating all the time, lowest being 14 and highest in 400s  (sees hematologist weekly with adjustment of meds), who was diagnosed with T4, T5, T6 compression fractures, probably due to use of steroids.          On 6/18/20 she is s/p T4-6 kyphoplasty. She tolerated surgery without complications. Surgical drains removed on 6/21/20. Treated for post op pain. Platelet stable at 115. Evaluated by Physical Therapy and recommended for outpatient PT. On day of discharge patient is medically and neurologically stable.

## 2020-06-21 NOTE — DISCHARGE NOTE PROVIDER - NSDCFUADDAPPT_GEN_ALL_CORE_FT
Please make an appointmentwith 1) Dr Porter in 1 week 2) Your Hematologist 3) Your Primary Care Physician Please make an appointment with 1) Dr Porter in 1 week 2) Your Hematologist 3) Your Primary Care Physician

## 2020-06-21 NOTE — PROGRESS NOTE ADULT - SUBJECTIVE AND OBJECTIVE BOX
SUBJECTIVE: Pt seen and examined, doing well, pain well controlled on current regimen, had BM    OVERNIGHT EVENTS: none    Vital Signs Last 24 Hrs  T(C): 37.1 (21 Jun 2020 08:34), Max: 37.1 (21 Jun 2020 08:34)  T(F): 98.7 (21 Jun 2020 08:34), Max: 98.7 (21 Jun 2020 08:34)  HR: 66 (21 Jun 2020 08:34) (61 - 76)  BP: 113/75 (21 Jun 2020 08:34) (94/56 - 149/66)  BP(mean): --  RR: 16 (21 Jun 2020 08:34) (16 - 18)  SpO2: 95% (21 Jun 2020 08:34) (93% - 95%)    PHYSICAL EXAM:    General: No Acute Distress     Neurological: Awake, alert oriented to person, place and time, Following Commands, Speech Fluent, Moving all extremities, Muscle Strength normal in all four extremities, No Drift, Sensation to Light Touch Intact    Pulmonary: Clear to Auscultation, No Rales, No Rhonchi, No Wheezes     Cardiovascular: S1, S2, Regular Rate and Rhythm     Gastrointestinal: Soft, Nontender, Nondistended     Incision: aquacel ag dressing c/d/i    LABS:                        8.0    8.64  )-----------( 115      ( 21 Jun 2020 06:00 )             26.6    06-21    142  |  108  |  24<H>  ----------------------------<  116<H>  4.2   |  26  |  1.18    Ca    8.2<L>      21 Jun 2020 06:00          06-20 @ 07:01  -  06-21 @ 07:00  --------------------------------------------------------  IN: 1180 mL / OUT: 180 mL / NET: 1000 mL    06-21 @ 07:01  -  06-21 @ 10:30  --------------------------------------------------------  IN: 240 mL / OUT: 0 mL / NET: 240 mL      DRAINS: HMV 30 cc x 24 hrs    MEDICATIONS:  Antibiotics:  ceFAZolin   IVPB 2000 milliGRAM(s) IV Intermittent once    Neuro:  acetaminophen   Tablet .. 325 milliGRAM(s) Oral every 4 hours PRN Temp greater or equal to 38C (100.4F), Mild Pain (1 - 3)  ondansetron Injectable 4 milliGRAM(s) IV Push every 6 hours PRN Nausea and/or Vomiting  oxyCODONE    IR 5 milliGRAM(s) Oral every 6 hours PRN Moderate Pain (4 - 6)  oxyCODONE    IR 10 milliGRAM(s) Oral every 6 hours PRN Severe Pain (7 - 10)    Cardiac:  lisinopril 10 milliGRAM(s) Oral daily    Pulm:    GI/:  pantoprazole    Tablet 40 milliGRAM(s) Oral before breakfast  polyethylene glycol 3350 17 Gram(s) Oral daily  senna 2 Tablet(s) Oral at bedtime    Other:   dextrose 40% Gel 15 Gram(s) Oral once PRN Blood Glucose LESS THAN 70 milliGRAM(s)/deciliter  dextrose 5%. 1000 milliLiter(s) IV Continuous <Continuous>  dextrose 50% Injectable 12.5 Gram(s) IV Push once  eltrombopag 75 milliGRAM(s) Oral daily  enoxaparin Injectable 30 milliGRAM(s) SubCutaneous every 12 hours  fenofibrate Tablet 145 milliGRAM(s) Oral daily  glucagon  Injectable 1 milliGRAM(s) IntraMuscular once PRN Glucose LESS THAN 70 milligrams/deciliter  insulin glargine Injectable (LANTUS) 22 Unit(s) SubCutaneous at bedtime  insulin lispro (HumaLOG) corrective regimen sliding scale   SubCutaneous at bedtime  insulin lispro (HumaLOG) corrective regimen sliding scale   SubCutaneous three times a day before meals  insulin lispro Injectable (HumaLOG) 7 Unit(s) SubCutaneous three times a day with meals  levothyroxine 500 MICROGram(s) Oral daily  predniSONE   Tablet 5 milliGRAM(s) Oral daily  sodium chloride 0.9%. 1000 milliLiter(s) IV Continuous <Continuous>    DIET: [] Regular [x] CCD [] Renal [] Puree [] Dysphagia [] Tube Feeds:     IMAGING:

## 2020-06-21 NOTE — PROGRESS NOTE ADULT - ATTENDING COMMENTS
Pt doing well, reports back pain is improved  Continue Hemovacs  Steroids for ITP/Thrombocytopenia.  PT consult
Pt stable neurologically.  Continue hemovacs  Steroids for thrombocytopenia  / ITP.
Dr. Tatiana Aquino  Division of Hospital Medicine  [facility]   Spectra: [phone]
Dr. Tatiana Aquino  Division of Hospital Medicine  [facility]   Spectra: [phone]

## 2020-06-21 NOTE — PROGRESS NOTE ADULT - ASSESSMENT
65 y/o female with h/o HTN, obesity, GERD, HLD, DM2, ITP diagnosed in 02/2019 on Prednisone and Promacta with platelets greatly fluctuating all the time, lowest being 14 and highest in 400s  (sees hematologist weekly with adjustment of meds), who was diagnosed with T4, T5, T6 compression fractures, probably due to use of steroids. She is scheduled for T4, T5, T6 Kyphoplasty.     PROCEDURE: T4-6 kyphoplasty,     POD#    PLAN:  Neuro:   Respiratory:   CV:  Endocrine:   Heme/Onc:             DVT ppx:   Renal:   ID:   GI:    PT/OT:   - PT - no needs     Discussed with Dr Charlotte Saeedink # [id number] 65 y/o female with h/o HTN, obesity, GERD, HLD, DM2, ITP diagnosed in 02/2019 on Prednisone and Promacta with platelets greatly fluctuating all the time, lowest being 14 and highest in 400s  (sees hematologist weekly with adjustment of meds), who was diagnosed with T4, T5, T6 compression fractures, probably due to use of steroids. She is scheduled for T4, T5, T6 Kyphoplasty.     PROCEDURE: 6/18 s/p T4-6 kyphoplasty,     POD#3    PLAN:  Neuro:   - pain well controlled, wants to go home  - dc HMV  - oxycodone prn pain  Respiratory:   CV:  - HTN- continue lisinopril  Endocrine:   - DMT2- continue fingersticks with insulin coverage  - hypothyroid- continue synthroid  Heme/Onc:       - ITP- stable- continue prednisone 5 mg daily and eltrombopag  - platelet 115 -up trending from 67          DVT ppx:   - venodynes, sq lovenox  GI:    - having BMs  - hold bowel regimen  PT/OT:   - PT - no needs     Discussed with Dr Porter        [facility] # [id number]

## 2020-06-21 NOTE — DISCHARGE NOTE PROVIDER - NSDCACTIVITY_GEN_ALL_CORE
No heavy lifting/straining/Do not drive or operate machinery/Showering allowed/Walking - Indoors allowed/Walking - Outdoors allowed/Stairs allowed

## 2020-06-22 ENCOUNTER — TRANSCRIPTION ENCOUNTER (OUTPATIENT)
Age: 65
End: 2020-06-22

## 2020-06-22 VITALS
TEMPERATURE: 98 F | DIASTOLIC BLOOD PRESSURE: 71 MMHG | SYSTOLIC BLOOD PRESSURE: 147 MMHG | RESPIRATION RATE: 18 BRPM | OXYGEN SATURATION: 91 % | HEART RATE: 60 BPM

## 2020-06-22 LAB — GLUCOSE BLDC GLUCOMTR-MCNC: 168 MG/DL — HIGH (ref 70–99)

## 2020-06-22 PROCEDURE — 97162 PT EVAL MOD COMPLEX 30 MIN: CPT

## 2020-06-22 PROCEDURE — 86901 BLOOD TYPING SEROLOGIC RH(D): CPT

## 2020-06-22 PROCEDURE — 82565 ASSAY OF CREATININE: CPT

## 2020-06-22 PROCEDURE — 86850 RBC ANTIBODY SCREEN: CPT

## 2020-06-22 PROCEDURE — 86923 COMPATIBILITY TEST ELECTRIC: CPT

## 2020-06-22 PROCEDURE — 84132 ASSAY OF SERUM POTASSIUM: CPT

## 2020-06-22 PROCEDURE — 97530 THERAPEUTIC ACTIVITIES: CPT

## 2020-06-22 PROCEDURE — 85014 HEMATOCRIT: CPT

## 2020-06-22 PROCEDURE — 85027 COMPLETE CBC AUTOMATED: CPT

## 2020-06-22 PROCEDURE — C1713: CPT

## 2020-06-22 PROCEDURE — P9016: CPT

## 2020-06-22 PROCEDURE — C1889: CPT

## 2020-06-22 PROCEDURE — 82435 ASSAY OF BLOOD CHLORIDE: CPT

## 2020-06-22 PROCEDURE — 82803 BLOOD GASES ANY COMBINATION: CPT

## 2020-06-22 PROCEDURE — 80048 BASIC METABOLIC PNL TOTAL CA: CPT

## 2020-06-22 PROCEDURE — 97116 GAIT TRAINING THERAPY: CPT

## 2020-06-22 PROCEDURE — 82330 ASSAY OF CALCIUM: CPT

## 2020-06-22 PROCEDURE — 84295 ASSAY OF SERUM SODIUM: CPT

## 2020-06-22 PROCEDURE — C1726: CPT

## 2020-06-22 PROCEDURE — 82040 ASSAY OF SERUM ALBUMIN: CPT

## 2020-06-22 PROCEDURE — 82962 GLUCOSE BLOOD TEST: CPT

## 2020-06-22 PROCEDURE — 83605 ASSAY OF LACTIC ACID: CPT

## 2020-06-22 PROCEDURE — 76000 FLUOROSCOPY <1 HR PHYS/QHP: CPT

## 2020-06-22 PROCEDURE — 86900 BLOOD TYPING SEROLOGIC ABO: CPT

## 2020-06-22 PROCEDURE — 36430 TRANSFUSION BLD/BLD COMPNT: CPT

## 2020-06-22 PROCEDURE — U0003: CPT

## 2020-06-22 PROCEDURE — 82947 ASSAY GLUCOSE BLOOD QUANT: CPT

## 2020-06-22 PROCEDURE — C1769: CPT

## 2020-06-22 PROCEDURE — 86769 SARS-COV-2 COVID-19 ANTIBODY: CPT

## 2020-06-22 RX ORDER — OXYCODONE HYDROCHLORIDE 5 MG/1
1 TABLET ORAL
Qty: 20 | Refills: 0
Start: 2020-06-22

## 2020-06-22 RX ORDER — ACETAMINOPHEN 500 MG
1 TABLET ORAL
Qty: 0 | Refills: 0 | DISCHARGE
Start: 2020-06-22

## 2020-06-22 RX ADMIN — PANTOPRAZOLE SODIUM 40 MILLIGRAM(S): 20 TABLET, DELAYED RELEASE ORAL at 06:03

## 2020-06-22 RX ADMIN — Medication 145 MILLIGRAM(S): at 06:03

## 2020-06-22 RX ADMIN — ELTROMBOPAG OLAMINE 75 MILLIGRAM(S): 50 TABLET, FILM COATED ORAL at 06:03

## 2020-06-22 RX ADMIN — Medication 500 MICROGRAM(S): at 06:03

## 2020-06-22 RX ADMIN — Medication 7 UNIT(S): at 07:33

## 2020-06-22 RX ADMIN — LISINOPRIL 10 MILLIGRAM(S): 2.5 TABLET ORAL at 06:03

## 2020-06-22 RX ADMIN — Medication 2: at 07:36

## 2020-06-22 RX ADMIN — Medication 5 MILLIGRAM(S): at 06:03

## 2020-06-22 RX ADMIN — ENOXAPARIN SODIUM 30 MILLIGRAM(S): 100 INJECTION SUBCUTANEOUS at 06:03

## 2020-06-22 NOTE — PROGRESS NOTE ADULT - SUBJECTIVE AND OBJECTIVE BOX
SUBJECTIVE: Pt seen and examined, doing well, will be discharged home today    OVERNIGHT EVENTS: none    Vital Signs Last 24 Hrs  T(C): 36.8 (22 Jun 2020 05:10), Max: 36.8 (22 Jun 2020 05:10)  T(F): 98.3 (22 Jun 2020 05:10), Max: 98.3 (22 Jun 2020 05:10)  HR: 60 (22 Jun 2020 05:10) (60 - 73)  BP: 147/71 (22 Jun 2020 05:10) (95/58 - 147/71)  BP(mean): --  RR: 18 (22 Jun 2020 05:10) (16 - 18)  SpO2: 91% (22 Jun 2020 05:10) (91% - 95%)    PHYSICAL EXAM:    General: No Acute Distress     Neurological: Awake, alert oriented to person, place and time, Following Commands, Speech Fluent, Moving all extremities, Muscle Strength normal in all four extremities, No Drift, Sensation to Light Touch Intact    Pulmonary: Clear to Auscultation, No Rales, No Rhonchi, No Wheezes     Cardiovascular: S1, S2, Regular Rate and Rhythm     Gastrointestinal: Soft, Nontender, Nondistended     Incision: back sutures c/d/i    LABS:                        8.0    8.64  )-----------( 115      ( 21 Jun 2020 06:00 )             26.6    06-21    142  |  108  |  24<H>  ----------------------------<  116<H>  4.2   |  26  |  1.18    Ca    8.2<L>      21 Jun 2020 06:00          06-21 @ 07:01  -  06-22 @ 07:00  --------------------------------------------------------  IN: 1660 mL / OUT: 0 mL / NET: 1660 mL      MEDICATIONS:  Antibiotics:  ceFAZolin   IVPB 2000 milliGRAM(s) IV Intermittent once    Neuro:  acetaminophen   Tablet .. 325 milliGRAM(s) Oral every 4 hours PRN Temp greater or equal to 38C (100.4F), Mild Pain (1 - 3)  ondansetron Injectable 4 milliGRAM(s) IV Push every 6 hours PRN Nausea and/or Vomiting  oxyCODONE    IR 5 milliGRAM(s) Oral every 6 hours PRN Moderate Pain (4 - 6)  oxyCODONE    IR 10 milliGRAM(s) Oral every 6 hours PRN Severe Pain (7 - 10)    Cardiac:  lisinopril 10 milliGRAM(s) Oral daily    Pulm:    GI/:  pantoprazole    Tablet 40 milliGRAM(s) Oral before breakfast    Other:   dextrose 40% Gel 15 Gram(s) Oral once PRN Blood Glucose LESS THAN 70 milliGRAM(s)/deciliter  dextrose 5%. 1000 milliLiter(s) IV Continuous <Continuous>  dextrose 50% Injectable 12.5 Gram(s) IV Push once  eltrombopag 75 milliGRAM(s) Oral daily  enoxaparin Injectable 30 milliGRAM(s) SubCutaneous every 12 hours  fenofibrate Tablet 145 milliGRAM(s) Oral daily  glucagon  Injectable 1 milliGRAM(s) IntraMuscular once PRN Glucose LESS THAN 70 milligrams/deciliter  insulin glargine Injectable (LANTUS) 22 Unit(s) SubCutaneous at bedtime  insulin lispro (HumaLOG) corrective regimen sliding scale   SubCutaneous at bedtime  insulin lispro (HumaLOG) corrective regimen sliding scale   SubCutaneous three times a day before meals  insulin lispro Injectable (HumaLOG) 7 Unit(s) SubCutaneous three times a day with meals  levothyroxine 500 MICROGram(s) Oral daily  predniSONE   Tablet 5 milliGRAM(s) Oral daily    DIET: [] Regular [c] CCD [] Renal [] Puree [] Dysphagia [] Tube Feeds:     IMAGING:

## 2020-06-22 NOTE — DISCHARGE NOTE NURSING/CASE MANAGEMENT/SOCIAL WORK - PATIENT PORTAL LINK FT
You can access the FollowMyHealth Patient Portal offered by [facility] by registering at the following website: [email or web address]. By joining [facility] FollowMyHealth portal, you will also be able to view your health information using other applications (apps) compatible with our system.

## 2020-06-22 NOTE — DISCHARGE NOTE NURSING/CASE MANAGEMENT/SOCIAL WORK - NSDCFUADDAPPT_GEN_ALL_CORE_FT
Please make an appointment with 1) Dr Porter in 1 week 2) Your Hematologist 3) Your Primary Care Physician

## 2020-06-22 NOTE — PROGRESS NOTE ADULT - ASSESSMENT
63 y/o female with h/o HTN, obesity, GERD, HLD, DM2, ITP diagnosed in 02/2019 on Prednisone and Promacta with platelets greatly fluctuating all the time, lowest being 14 and highest in 400s  (sees hematologist weekly with adjustment of meds), who was diagnosed with T4, T5, T6 compression fractures, probably due to use of steroids. She is scheduled for T4, T5, T6 Kyphoplasty.     PROCEDURE: 6/18 s/p T4-6 kyphoplasty,     POD#3    PLAN:  Neuro:   - pain well controlled, wants to go home  - oxycodone prn pain  Respiratory:   CV:  - HTN- continue lisinopril  Endocrine:   - DMT2- continue fingersticks with insulin coverage  - hypothyroid- continue synthroid  Heme/Onc:       - ITP- stable- continue prednisone 5 mg daily and eltrombopag  - platelet 115 -up trending from 67          DVT ppx:   - venodynes, sq lovenox  GI:    - having BMs, 1 dose of imodium given  - hold bowel regimen  PT/OT:   - PT - no needs     Discussed with Dr Porter        [facility] # [id number]

## 2020-11-03 NOTE — H&P PST ADULT - BLOOD TRANSFUSION, PREVIOUS, PROFILE
Negin Bach    RE: TORRIE ANDREA 11/06/20 pt wants to reschedule  Received: Today  Message Contents   Vanessa Pettit; ARIELA [facility] Rn Case Approvals; [facility] Pre-Admission Rn Pool             Case has been removed let us know if you are able to fill in the gap.     Thank you   Vanessa         no

## 2021-02-24 ENCOUNTER — OUTPATIENT (OUTPATIENT)
Dept: OUTPATIENT SERVICES | Facility: HOSPITAL | Age: 66
LOS: 1 days | End: 2021-02-24
Payer: MEDICARE

## 2021-02-24 ENCOUNTER — APPOINTMENT (OUTPATIENT)
Dept: MRI IMAGING | Facility: IMAGING CENTER | Age: 66
End: 2021-02-24
Payer: MEDICARE

## 2021-02-24 DIAGNOSIS — Z98.890 OTHER SPECIFIED POSTPROCEDURAL STATES: Chronic | ICD-10-CM

## 2021-02-24 DIAGNOSIS — S22.000S WEDGE COMPRESSION FRACTURE OF UNSPECIFIED THORACIC VERTEBRA, SEQUELA: ICD-10-CM

## 2021-02-24 DIAGNOSIS — Z90.49 ACQUIRED ABSENCE OF OTHER SPECIFIED PARTS OF DIGESTIVE TRACT: Chronic | ICD-10-CM

## 2021-02-24 DIAGNOSIS — Z98.89 OTHER SPECIFIED POSTPROCEDURAL STATES: Chronic | ICD-10-CM

## 2021-02-24 PROCEDURE — 72146 MRI CHEST SPINE W/O DYE: CPT | Mod: 26,MH

## 2021-02-24 PROCEDURE — 72146 MRI CHEST SPINE W/O DYE: CPT

## 2021-03-10 ENCOUNTER — APPOINTMENT (OUTPATIENT)
Dept: CT IMAGING | Facility: HOSPITAL | Age: 66
End: 2021-03-10

## 2021-03-10 ENCOUNTER — OUTPATIENT (OUTPATIENT)
Dept: OUTPATIENT SERVICES | Facility: HOSPITAL | Age: 66
LOS: 1 days | End: 2021-03-10
Payer: MEDICARE

## 2021-03-10 ENCOUNTER — TRANSCRIPTION ENCOUNTER (OUTPATIENT)
Age: 66
End: 2021-03-10

## 2021-03-10 DIAGNOSIS — Z98.89 OTHER SPECIFIED POSTPROCEDURAL STATES: Chronic | ICD-10-CM

## 2021-03-10 DIAGNOSIS — Z98.890 OTHER SPECIFIED POSTPROCEDURAL STATES: Chronic | ICD-10-CM

## 2021-03-10 DIAGNOSIS — Z00.00 ENCOUNTER FOR GENERAL ADULT MEDICAL EXAMINATION WITHOUT ABNORMAL FINDINGS: ICD-10-CM

## 2021-03-10 DIAGNOSIS — Z90.49 ACQUIRED ABSENCE OF OTHER SPECIFIED PARTS OF DIGESTIVE TRACT: Chronic | ICD-10-CM

## 2021-03-10 DIAGNOSIS — S22.000S WEDGE COMPRESSION FRACTURE OF UNSPECIFIED THORACIC VERTEBRA, SEQUELA: ICD-10-CM

## 2021-03-10 PROCEDURE — 72128 CT CHEST SPINE W/O DYE: CPT

## 2021-03-10 PROCEDURE — 72128 CT CHEST SPINE W/O DYE: CPT | Mod: 26,MH

## 2021-03-12 NOTE — DISCHARGE NOTE PROVIDER - CARE PROVIDERS DIRECT ADDRESSES
Taylor Chau was admitted to [facility] from ED via cart accompanied by daughter.   Reason for hospitalization is Afib RVR and Sepsis.   Upon arrival, patient is stable. Patient oriented to bed, call light, , room and unit.     Level of understanding patient verbalized understanding.   Admission orders received at this time.   See Epic documentation for patient individualized nursing care plan.   [email or web address]

## 2021-04-05 ENCOUNTER — OUTPATIENT (OUTPATIENT)
Dept: OUTPATIENT SERVICES | Facility: HOSPITAL | Age: 66
LOS: 1 days | End: 2021-04-05
Payer: MEDICARE

## 2021-04-05 VITALS
SYSTOLIC BLOOD PRESSURE: 110 MMHG | OXYGEN SATURATION: 96 % | HEIGHT: 62 IN | WEIGHT: 209 LBS | TEMPERATURE: 99 F | HEART RATE: 89 BPM | RESPIRATION RATE: 14 BRPM | DIASTOLIC BLOOD PRESSURE: 67 MMHG

## 2021-04-05 DIAGNOSIS — Z98.890 OTHER SPECIFIED POSTPROCEDURAL STATES: Chronic | ICD-10-CM

## 2021-04-05 DIAGNOSIS — E11.9 TYPE 2 DIABETES MELLITUS WITHOUT COMPLICATIONS: ICD-10-CM

## 2021-04-05 DIAGNOSIS — Z98.49 CATARACT EXTRACTION STATUS, UNSPECIFIED EYE: Chronic | ICD-10-CM

## 2021-04-05 DIAGNOSIS — S22.009A UNSPECIFIED FRACTURE OF UNSPECIFIED THORACIC VERTEBRA, INITIAL ENCOUNTER FOR CLOSED FRACTURE: ICD-10-CM

## 2021-04-05 DIAGNOSIS — Z98.89 OTHER SPECIFIED POSTPROCEDURAL STATES: Chronic | ICD-10-CM

## 2021-04-05 DIAGNOSIS — Z29.9 ENCOUNTER FOR PROPHYLACTIC MEASURES, UNSPECIFIED: ICD-10-CM

## 2021-04-05 DIAGNOSIS — Z90.49 ACQUIRED ABSENCE OF OTHER SPECIFIED PARTS OF DIGESTIVE TRACT: Chronic | ICD-10-CM

## 2021-04-05 DIAGNOSIS — D69.3 IMMUNE THROMBOCYTOPENIC PURPURA: ICD-10-CM

## 2021-04-05 DIAGNOSIS — S22.000S WEDGE COMPRESSION FRACTURE OF UNSPECIFIED THORACIC VERTEBRA, SEQUELA: ICD-10-CM

## 2021-04-05 LAB
A1C WITH ESTIMATED AVERAGE GLUCOSE RESULT: 7 % — HIGH (ref 4–5.6)
ALBUMIN SERPL ELPH-MCNC: 3.9 G/DL — SIGNIFICANT CHANGE UP (ref 3.3–5)
ALP SERPL-CCNC: 84 U/L — SIGNIFICANT CHANGE UP (ref 40–120)
ALT FLD-CCNC: 9 U/L — LOW (ref 10–45)
ANION GAP SERPL CALC-SCNC: 11 MMOL/L — SIGNIFICANT CHANGE UP (ref 5–17)
AST SERPL-CCNC: 19 U/L — SIGNIFICANT CHANGE UP (ref 10–40)
BILIRUB SERPL-MCNC: 0.3 MG/DL — SIGNIFICANT CHANGE UP (ref 0.2–1.2)
BLD GP AB SCN SERPL QL: NEGATIVE — SIGNIFICANT CHANGE UP
BUN SERPL-MCNC: 19 MG/DL — SIGNIFICANT CHANGE UP (ref 7–23)
CALCIUM SERPL-MCNC: 9.6 MG/DL — SIGNIFICANT CHANGE UP (ref 8.4–10.5)
CHLORIDE SERPL-SCNC: 104 MMOL/L — SIGNIFICANT CHANGE UP (ref 96–108)
CO2 SERPL-SCNC: 23 MMOL/L — SIGNIFICANT CHANGE UP (ref 22–31)
CREAT SERPL-MCNC: 0.9 MG/DL — SIGNIFICANT CHANGE UP (ref 0.5–1.3)
ESTIMATED AVERAGE GLUCOSE: 154 MG/DL — HIGH (ref 68–114)
GLUCOSE SERPL-MCNC: 151 MG/DL — HIGH (ref 70–99)
HCT VFR BLD CALC: 39.4 % — SIGNIFICANT CHANGE UP (ref 34.5–45)
HGB BLD-MCNC: 12.3 G/DL — SIGNIFICANT CHANGE UP (ref 11.5–15.5)
MCHC RBC-ENTMCNC: 27.8 PG — SIGNIFICANT CHANGE UP (ref 27–34)
MCHC RBC-ENTMCNC: 31.2 GM/DL — LOW (ref 32–36)
MCV RBC AUTO: 89.1 FL — SIGNIFICANT CHANGE UP (ref 80–100)
MRSA PCR RESULT.: SIGNIFICANT CHANGE UP
NRBC # BLD: 0 /100 WBCS — SIGNIFICANT CHANGE UP (ref 0–0)
PLATELET # BLD AUTO: 172 K/UL — SIGNIFICANT CHANGE UP (ref 150–400)
POTASSIUM SERPL-MCNC: 3.9 MMOL/L — SIGNIFICANT CHANGE UP (ref 3.5–5.3)
POTASSIUM SERPL-SCNC: 3.9 MMOL/L — SIGNIFICANT CHANGE UP (ref 3.5–5.3)
PROT SERPL-MCNC: 6.4 G/DL — SIGNIFICANT CHANGE UP (ref 6–8.3)
RBC # BLD: 4.42 M/UL — SIGNIFICANT CHANGE UP (ref 3.8–5.2)
RBC # FLD: 14.6 % — HIGH (ref 10.3–14.5)
RH IG SCN BLD-IMP: POSITIVE — SIGNIFICANT CHANGE UP
S AUREUS DNA NOSE QL NAA+PROBE: SIGNIFICANT CHANGE UP
SODIUM SERPL-SCNC: 138 MMOL/L — SIGNIFICANT CHANGE UP (ref 135–145)
WBC # BLD: 9.82 K/UL — SIGNIFICANT CHANGE UP (ref 3.8–10.5)
WBC # FLD AUTO: 9.82 K/UL — SIGNIFICANT CHANGE UP (ref 3.8–10.5)

## 2021-04-05 PROCEDURE — 85027 COMPLETE CBC AUTOMATED: CPT

## 2021-04-05 PROCEDURE — 80053 COMPREHEN METABOLIC PANEL: CPT

## 2021-04-05 PROCEDURE — 86901 BLOOD TYPING SEROLOGIC RH(D): CPT

## 2021-04-05 PROCEDURE — 87640 STAPH A DNA AMP PROBE: CPT

## 2021-04-05 PROCEDURE — 83036 HEMOGLOBIN GLYCOSYLATED A1C: CPT

## 2021-04-05 PROCEDURE — 86900 BLOOD TYPING SEROLOGIC ABO: CPT

## 2021-04-05 PROCEDURE — G0463: CPT

## 2021-04-05 PROCEDURE — 86850 RBC ANTIBODY SCREEN: CPT

## 2021-04-05 PROCEDURE — 87641 MR-STAPH DNA AMP PROBE: CPT

## 2021-04-05 RX ORDER — LEVOTHYROXINE SODIUM 125 MCG
5 TABLET ORAL
Qty: 0 | Refills: 0 | DISCHARGE

## 2021-04-05 RX ORDER — ELTROMBOPAG OLAMINE 50 MG/1
1 TABLET, FILM COATED ORAL
Qty: 0 | Refills: 0 | DISCHARGE

## 2021-04-05 RX ORDER — CEFAZOLIN SODIUM 1 G
2000 VIAL (EA) INJECTION ONCE
Refills: 0 | Status: DISCONTINUED | OUTPATIENT
Start: 2021-04-26 | End: 2021-04-27

## 2021-04-05 NOTE — H&P PST ADULT - NSICDXPASTMEDICALHX_GEN_ALL_CORE_FT
PAST MEDICAL HISTORY:  Breast lump right breast    Cholesterol Serum Elevated (ICD9 272.9)     Chronic ITP (idiopathic thrombocytopenia) diagnosed 02/2019, lowest platelets 14, on Prednisone and Promacta, platelets levels fluctuate, Pt. sees hematologist weekly, doses adjusted weekly    Diabetes Mellitus Type 2 in Obese (ICD9 250.00)     Gall Stones (ICD9 574.20)     GERD (gastroesophageal reflux disease)     H/O: Hypertension (ICD9 V12.59)     H/O: Hypothyroidism (ICD9 V12.2)     Hepatomegaly US and CT 02/2019 with possible cirrhosis    Morbid Obesity (ICD9 278.01)     Uterine polyp      PAST MEDICAL HISTORY:  Breast lump right breast    Cholesterol Serum Elevated (ICD9 272.9)     Chronic ITP (idiopathic thrombocytopenia) diagnosed 02/2019, lowest platelets 14, on Prednisone and Promacta, platelets levels fluctuate, Pt. sees hematologist weekly, doses adjusted weekly    Diabetes Mellitus Type 2 in Obese (ICD9 250.00)     Gall Stones (ICD9 574.20)     GERD (gastroesophageal reflux disease)     H/O: Hypertension (ICD9 V12.59)     H/O: Hypothyroidism (ICD9 V12.2)     Hepatomegaly US and CT 02/2019 with possible cirrhosis    Morbid Obesity (ICD9 278.01)     Thoracic spine fracture 2/2 chronic steriod use for ITP    Uterine polyp      PAST MEDICAL HISTORY:  Breast lump right breast    Cholesterol Serum Elevated (ICD9 272.9)     Chronic ITP (idiopathic thrombocytopenia) diagnosed 02/2019, lowest platelets 14, on Prednisone and Promacta, platelets levels fluctuate, Pt. sees hematologist weekly, doses adjusted weekly    Diabetes Mellitus Type 2 in Obese (ICD9 250.00)     Gall Stones (ICD9 574.20)     GERD (gastroesophageal reflux disease)     H/O: Hypertension (ICD9 V12.59)     H/O: Hypothyroidism (ICD9 V12.2)     Hepatomegaly US and CT 02/2019    Morbid Obesity (ICD9 278.01)     Thoracic spine fracture 2/2 chronic steriod use for ITP    Uterine polyp      PAST MEDICAL HISTORY:  Breast lump right breast    Cholesterol Serum Elevated (ICD9 272.9)     Chronic ITP (idiopathic thrombocytopenia) diagnosed 02/2019, lowest platelets 14, on Prednisone and Promacta, platelets levels fluctuate, Pt. sees hematologist weekly, doses adjusted weekly    Diabetes Mellitus Type 2 in Obese (ICD9 250.00)     Gall Stones (ICD9 574.20)     GERD (gastroesophageal reflux disease)     H/O: Hypertension (ICD9 V12.59)     H/O: Hypothyroidism (ICD9 V12.2)     Hepatomegaly US and CT 02/2019 spleen normal    Morbid Obesity (ICD9 278.01)     Thoracic spine fracture 2/2 chronic steriod use for ITP    Uterine polyp

## 2021-04-05 NOTE — H&P PST ADULT - NSICDXPROBLEM_GEN_ALL_CORE_FT
PROBLEM DIAGNOSES  Problem: Need for prophylactic measure  Assessment and Plan: The Caprini score indicates that this patient is at high risk for a VTE event (score 6 or greater). Surgical patients in this group will benefit from both pharmacologic prophylaxis and intermittent compression devices.  The surgical team will determine the balance between VTE risk and bleeding risk, and other clinical considerations         PROBLEM DIAGNOSES  Problem: Thoracic spine fracture  Assessment and Plan: Scheduled T3-T7 fusion  MRSA/MSSA swab collected    Problem: Chronic ITP (idiopathic thrombocytopenia)  Assessment and Plan: pt to see heme prior to sx - will request note  c/w treatment regimen as directed by Dr. Soria  CBC ordered      Problem: T2DM (type 2 diabetes mellitus)  Assessment and Plan: last dose glipizide 4/25  fingerstick on admission  A1c ordered with PST labs      Problem: Need for prophylactic measure  Assessment and Plan: The Caprini score indicates that this patient is at high risk for a VTE event (score 6 or greater). Surgical patients in this group will benefit from both pharmacologic prophylaxis and intermittent compression devices.  The surgical team will determine the balance between VTE risk and bleeding risk, and other clinical considerations         PROBLEM DIAGNOSES  Problem: Thoracic spine fracture  Assessment and Plan: Scheduled T3-T7 fusion  MRSA/MSSA swab collected    Problem: Chronic ITP (idiopathic thrombocytopenia)  Assessment and Plan: pt to see heme prior to sx - will request note  c/w treatment regimen as directed by Dr. Soria  CBC ordered in PST and stat CBC ordered for morning of surgery       Problem: T2DM (type 2 diabetes mellitus)  Assessment and Plan: last dose glipizide 4/25  fingerstick on admission  A1c ordered with PST labs      Problem: Need for prophylactic measure  Assessment and Plan: The Caprini score indicates that this patient is at high risk for a VTE event (score 6 or greater). Surgical patients in this group will benefit from both pharmacologic prophylaxis and intermittent compression devices.  The surgical team will determine the balance between VTE risk and bleeding risk, and other clinical considerations

## 2021-04-05 NOTE — H&P PST ADULT - ASSESSMENT
CAPRINI SCORE [CLOT updated 18]    AGE RELATED RISK FACTORS                                                       MOBILITY RELATED FACTORS  [ ] Age 41-60 years                                            (1 Point)                    [ ] Bed rest                                                        (1 Point)  [ ] Age: 61-74 years                                           (2 Points)                  [ ] Plaster cast                                                   (2 Points)  [ ] Age= 75 years                                              (3 Points)                    [ ] Bed bound for more than 72 hours                 (2 Points)    DISEASE RELATED RISK FACTORS                                               GENDER SPECIFIC FACTORS  [ ] Edema in the lower extremities                       (1 Point)              [ ] Pregnancy                                                     (1 Point)  [ ] Varicose veins                                               (1 Point)                     [ ] Post-partum < 6 weeks                                   (1 Point)             [ ] BMI > 25 Kg/m2                                            (1 Point)                     [ ] Hormonal therapy  or oral contraception          (1 Point)                 [ ] Sepsis (in the previous month)                        (1 Point)               [ ] History of pregnancy complications                 (1 point)  [ ] Pneumonia or serious lung disease                                               [ ] Unexplained or recurrent                     (1 Point)           (in the previous month)                                (1 Point)  [ ] Abnormal pulmonary function test                     (1 Point)                 SURGERY RELATED RISK FACTORS  [ ] Acute myocardial infarction                                (1 Point)               [ ]  Section                                             (1 Point)  [ ] Congestive heart failure (in the previous month)  (1 Point)                [ ] Minor surgery                                                  (1 Point)   [ ] Inflammatory bowel disease                               (1 Point)               [ ] Arthroscopic surgery                                        (2 Points)  [ ] Central venous access                                        (2 Points)                [ ] General surgery lasting more than 45 minutes (2 points)  [ ] Present or previous malignancy                        (2 Points)                [ ] Elective arthroplasty                                         (5 points)    [ ] Stroke (in the previous month)                          (5 Points)                                                                                                                                                           HEMATOLOGY RELATED FACTORS                                                 TRAUMA RELATED RISK FACTORS  [ ] Prior episodes of VTE                                     (3 Points)                [ ] Fracture of the hip, pelvis, or leg                       (5 Points)  [ ] Positive family history for VTE                         (3 Points)             [ ] Acute spinal cord injury (in the previous month)  (5 Points)  [ ] Prothrombin 65121 A                                     (3 Points)               [ ] Paralysis  (less than 1 month)                             (5 Points)  [ ] Factor V Leiden                                             (3 Points)                  [ ] Multiple Trauma within 1 month                        (5 Points)  [ ] Lupus anticoagulants                                     (3 Points)                                                           [ ] Anticardiolipin antibodies                               (3 Points)                                                       [ ] High homocysteine in the blood                      (3 Points)                                             [ ] Other congenital or acquired thrombophilia      (3 Points)                                                [ ] Heparin induced thrombocytopenia                  (3 Points)                                     Total Score [   8       ]

## 2021-04-05 NOTE — H&P PST ADULT - HISTORY OF PRESENT ILLNESS
64 y/o female with pmhx of  64 y/o female with pmhx of         2nd Pfizer dose - 4/10 66 y/o female with pmhx of ITP currently on Promacta s/p BMB w/ benign results, denies significant bleeding events in the past, T2DM, HTN, obesity, s/p thoracic spine fracture 2/2 chronic steroid use for ITP s/p T4-T6 kyphoplasty in 6/202 now presents with worsening severe thoracic spine pain, 8/10, limiting her ADLs and quality of life. She denies taking pain medication and the only time she is not in pain is while lying on her left side. Presents for T3-T7 posterior stabilization and fusion.       *[facility]  2nd Pfizer dose - 4/10/21

## 2021-04-05 NOTE — H&P PST ADULT - NSICDXFAMILYHX_GEN_ALL_CORE_FT
FAMILY HISTORY:  Mother  Still living? Unknown  Family history of breast cancer, Age at diagnosis: Age Unknown

## 2021-04-05 NOTE — H&P PST ADULT - SPO2 (%)
Assumed care of pt. at 1900    Bedside report received from MIN Alas   POC discussed with pt. At bedside and Pt. verbalizes understanding.  Pt. Is Awake and AOx 4 , heparin gtt infusing, and running SR 66 on the monitor  Call light within reach  with appropriate instructions to call for assistance  Bed locked and in lowest position.       96

## 2021-04-19 PROBLEM — S22.009A UNSPECIFIED FRACTURE OF UNSPECIFIED THORACIC VERTEBRA, INITIAL ENCOUNTER FOR CLOSED FRACTURE: Chronic | Status: ACTIVE | Noted: 2021-04-05

## 2021-04-19 PROBLEM — R16.0 HEPATOMEGALY, NOT ELSEWHERE CLASSIFIED: Chronic | Status: ACTIVE | Noted: 2020-06-11

## 2021-04-23 ENCOUNTER — OUTPATIENT (OUTPATIENT)
Dept: OUTPATIENT SERVICES | Facility: HOSPITAL | Age: 66
LOS: 1 days | End: 2021-04-23

## 2021-04-23 DIAGNOSIS — Z98.890 OTHER SPECIFIED POSTPROCEDURAL STATES: Chronic | ICD-10-CM

## 2021-04-23 DIAGNOSIS — Z90.49 ACQUIRED ABSENCE OF OTHER SPECIFIED PARTS OF DIGESTIVE TRACT: Chronic | ICD-10-CM

## 2021-04-23 DIAGNOSIS — Z11.52 ENCOUNTER FOR SCREENING FOR COVID-19: ICD-10-CM

## 2021-04-23 DIAGNOSIS — Z98.49 CATARACT EXTRACTION STATUS, UNSPECIFIED EYE: Chronic | ICD-10-CM

## 2021-04-23 DIAGNOSIS — Z98.89 OTHER SPECIFIED POSTPROCEDURAL STATES: Chronic | ICD-10-CM

## 2021-04-23 LAB — SARS-COV-2 RNA SPEC QL NAA+PROBE: SIGNIFICANT CHANGE UP

## 2021-04-25 ENCOUNTER — TRANSCRIPTION ENCOUNTER (OUTPATIENT)
Age: 66
End: 2021-04-25

## 2021-04-26 ENCOUNTER — INPATIENT (INPATIENT)
Facility: HOSPITAL | Age: 66
LOS: 3 days | Discharge: HOME | DRG: 460 | End: 2021-04-30
Attending: NEUROLOGICAL SURGERY | Admitting: NEUROLOGICAL SURGERY
Payer: MEDICARE

## 2021-04-26 VITALS
TEMPERATURE: 98 F | HEART RATE: 84 BPM | RESPIRATION RATE: 17 BRPM | HEIGHT: 62 IN | WEIGHT: 209 LBS | OXYGEN SATURATION: 98 %

## 2021-04-26 DIAGNOSIS — Z98.89 OTHER SPECIFIED POSTPROCEDURAL STATES: Chronic | ICD-10-CM

## 2021-04-26 DIAGNOSIS — Z98.49 CATARACT EXTRACTION STATUS, UNSPECIFIED EYE: Chronic | ICD-10-CM

## 2021-04-26 DIAGNOSIS — Z98.890 OTHER SPECIFIED POSTPROCEDURAL STATES: Chronic | ICD-10-CM

## 2021-04-26 DIAGNOSIS — Z90.49 ACQUIRED ABSENCE OF OTHER SPECIFIED PARTS OF DIGESTIVE TRACT: Chronic | ICD-10-CM

## 2021-04-26 DIAGNOSIS — S22.000S WEDGE COMPRESSION FRACTURE OF UNSPECIFIED THORACIC VERTEBRA, SEQUELA: ICD-10-CM

## 2021-04-26 LAB
ANION GAP SERPL CALC-SCNC: 13 MMOL/L — SIGNIFICANT CHANGE UP (ref 5–17)
BASOPHILS # BLD AUTO: 0.04 K/UL — SIGNIFICANT CHANGE UP (ref 0–0.2)
BASOPHILS NFR BLD AUTO: 0.5 % — SIGNIFICANT CHANGE UP (ref 0–2)
BUN SERPL-MCNC: 16 MG/DL — SIGNIFICANT CHANGE UP (ref 7–23)
CALCIUM SERPL-MCNC: 8.4 MG/DL — SIGNIFICANT CHANGE UP (ref 8.4–10.5)
CHLORIDE SERPL-SCNC: 109 MMOL/L — HIGH (ref 96–108)
CO2 SERPL-SCNC: 16 MMOL/L — LOW (ref 22–31)
CREAT SERPL-MCNC: 0.86 MG/DL — SIGNIFICANT CHANGE UP (ref 0.5–1.3)
EOSINOPHIL # BLD AUTO: 0.24 K/UL — SIGNIFICANT CHANGE UP (ref 0–0.5)
EOSINOPHIL NFR BLD AUTO: 2.9 % — SIGNIFICANT CHANGE UP (ref 0–6)
GLUCOSE BLDC GLUCOMTR-MCNC: 156 MG/DL — HIGH (ref 70–99)
GLUCOSE BLDC GLUCOMTR-MCNC: 206 MG/DL — HIGH (ref 70–99)
GLUCOSE BLDC GLUCOMTR-MCNC: 206 MG/DL — HIGH (ref 70–99)
GLUCOSE BLDC GLUCOMTR-MCNC: 221 MG/DL — HIGH (ref 70–99)
GLUCOSE SERPL-MCNC: 201 MG/DL — HIGH (ref 70–99)
HCT VFR BLD CALC: 34.3 % — LOW (ref 34.5–45)
HCT VFR BLD CALC: 40 % — SIGNIFICANT CHANGE UP (ref 34.5–45)
HGB BLD-MCNC: 10.7 G/DL — LOW (ref 11.5–15.5)
HGB BLD-MCNC: 12.4 G/DL — SIGNIFICANT CHANGE UP (ref 11.5–15.5)
IMM GRANULOCYTES NFR BLD AUTO: 0.7 % — SIGNIFICANT CHANGE UP (ref 0–1.5)
LYMPHOCYTES # BLD AUTO: 1.35 K/UL — SIGNIFICANT CHANGE UP (ref 1–3.3)
LYMPHOCYTES # BLD AUTO: 16.5 % — SIGNIFICANT CHANGE UP (ref 13–44)
MCHC RBC-ENTMCNC: 27.8 PG — SIGNIFICANT CHANGE UP (ref 27–34)
MCHC RBC-ENTMCNC: 27.8 PG — SIGNIFICANT CHANGE UP (ref 27–34)
MCHC RBC-ENTMCNC: 31 GM/DL — LOW (ref 32–36)
MCHC RBC-ENTMCNC: 31.2 GM/DL — LOW (ref 32–36)
MCV RBC AUTO: 89.1 FL — SIGNIFICANT CHANGE UP (ref 80–100)
MCV RBC AUTO: 89.7 FL — SIGNIFICANT CHANGE UP (ref 80–100)
MONOCYTES # BLD AUTO: 0.5 K/UL — SIGNIFICANT CHANGE UP (ref 0–0.9)
MONOCYTES NFR BLD AUTO: 6.1 % — SIGNIFICANT CHANGE UP (ref 2–14)
NEUTROPHILS # BLD AUTO: 5.99 K/UL — SIGNIFICANT CHANGE UP (ref 1.8–7.4)
NEUTROPHILS NFR BLD AUTO: 73.3 % — SIGNIFICANT CHANGE UP (ref 43–77)
NRBC # BLD: 0 /100 WBCS — SIGNIFICANT CHANGE UP (ref 0–0)
NRBC # BLD: 0 /100 WBCS — SIGNIFICANT CHANGE UP (ref 0–0)
PLATELET # BLD AUTO: 153 K/UL — SIGNIFICANT CHANGE UP (ref 150–400)
PLATELET # BLD AUTO: 158 K/UL — SIGNIFICANT CHANGE UP (ref 150–400)
POTASSIUM SERPL-MCNC: 4.8 MMOL/L — SIGNIFICANT CHANGE UP (ref 3.5–5.3)
POTASSIUM SERPL-SCNC: 4.8 MMOL/L — SIGNIFICANT CHANGE UP (ref 3.5–5.3)
RBC # BLD: 3.85 M/UL — SIGNIFICANT CHANGE UP (ref 3.8–5.2)
RBC # BLD: 4.46 M/UL — SIGNIFICANT CHANGE UP (ref 3.8–5.2)
RBC # FLD: 13.7 % — SIGNIFICANT CHANGE UP (ref 10.3–14.5)
RBC # FLD: 13.8 % — SIGNIFICANT CHANGE UP (ref 10.3–14.5)
SODIUM SERPL-SCNC: 138 MMOL/L — SIGNIFICANT CHANGE UP (ref 135–145)
WBC # BLD: 7.16 K/UL — SIGNIFICANT CHANGE UP (ref 3.8–10.5)
WBC # BLD: 8.18 K/UL — SIGNIFICANT CHANGE UP (ref 3.8–10.5)
WBC # FLD AUTO: 7.16 K/UL — SIGNIFICANT CHANGE UP (ref 3.8–10.5)
WBC # FLD AUTO: 8.18 K/UL — SIGNIFICANT CHANGE UP (ref 3.8–10.5)

## 2021-04-26 RX ORDER — FENOFIBRATE,MICRONIZED 130 MG
1 CAPSULE ORAL
Qty: 0 | Refills: 0 | DISCHARGE

## 2021-04-26 RX ORDER — ONDANSETRON 8 MG/1
4 TABLET, FILM COATED ORAL ONCE
Refills: 0 | Status: DISCONTINUED | OUTPATIENT
Start: 2021-04-26 | End: 2021-04-26

## 2021-04-26 RX ORDER — FENOFIBRATE,MICRONIZED 130 MG
145 CAPSULE ORAL DAILY
Refills: 0 | Status: DISCONTINUED | OUTPATIENT
Start: 2021-04-26 | End: 2021-04-30

## 2021-04-26 RX ORDER — DEXTROSE 50 % IN WATER 50 %
25 SYRINGE (ML) INTRAVENOUS ONCE
Refills: 0 | Status: DISCONTINUED | OUTPATIENT
Start: 2021-04-26 | End: 2021-04-30

## 2021-04-26 RX ORDER — LISINOPRIL 2.5 MG/1
10 TABLET ORAL DAILY
Refills: 0 | Status: DISCONTINUED | OUTPATIENT
Start: 2021-04-26 | End: 2021-04-28

## 2021-04-26 RX ORDER — PANTOPRAZOLE SODIUM 20 MG/1
40 TABLET, DELAYED RELEASE ORAL
Refills: 0 | Status: DISCONTINUED | OUTPATIENT
Start: 2021-04-26 | End: 2021-04-30

## 2021-04-26 RX ORDER — ONDANSETRON 8 MG/1
4 TABLET, FILM COATED ORAL EVERY 6 HOURS
Refills: 0 | Status: DISCONTINUED | OUTPATIENT
Start: 2021-04-26 | End: 2021-04-30

## 2021-04-26 RX ORDER — ELTROMBOPAG OLAMINE 50 MG/1
1 TABLET, FILM COATED ORAL
Qty: 0 | Refills: 0 | DISCHARGE

## 2021-04-26 RX ORDER — LIDOCAINE HCL 20 MG/ML
0.2 VIAL (ML) INJECTION ONCE
Refills: 0 | Status: DISCONTINUED | OUTPATIENT
Start: 2021-04-26 | End: 2021-04-26

## 2021-04-26 RX ORDER — CEFAZOLIN SODIUM 1 G
1000 VIAL (EA) INJECTION EVERY 8 HOURS
Refills: 0 | Status: COMPLETED | OUTPATIENT
Start: 2021-04-26 | End: 2021-04-27

## 2021-04-26 RX ORDER — INSULIN LISPRO 100/ML
VIAL (ML) SUBCUTANEOUS AT BEDTIME
Refills: 0 | Status: DISCONTINUED | OUTPATIENT
Start: 2021-04-26 | End: 2021-04-30

## 2021-04-26 RX ORDER — ATORVASTATIN CALCIUM 80 MG/1
80 TABLET, FILM COATED ORAL AT BEDTIME
Refills: 0 | Status: DISCONTINUED | OUTPATIENT
Start: 2021-04-26 | End: 2021-04-30

## 2021-04-26 RX ORDER — OXYBUTYNIN CHLORIDE 5 MG
10 TABLET ORAL
Refills: 0 | Status: DISCONTINUED | OUTPATIENT
Start: 2021-04-26 | End: 2021-04-30

## 2021-04-26 RX ORDER — SENNA PLUS 8.6 MG/1
2 TABLET ORAL AT BEDTIME
Refills: 0 | Status: DISCONTINUED | OUTPATIENT
Start: 2021-04-26 | End: 2021-04-27

## 2021-04-26 RX ORDER — DEXTROSE 50 % IN WATER 50 %
12.5 SYRINGE (ML) INTRAVENOUS ONCE
Refills: 0 | Status: DISCONTINUED | OUTPATIENT
Start: 2021-04-26 | End: 2021-04-30

## 2021-04-26 RX ORDER — OXYCODONE HYDROCHLORIDE 5 MG/1
5 TABLET ORAL EVERY 4 HOURS
Refills: 0 | Status: DISCONTINUED | OUTPATIENT
Start: 2021-04-26 | End: 2021-04-30

## 2021-04-26 RX ORDER — SODIUM CHLORIDE 9 MG/ML
1000 INJECTION, SOLUTION INTRAVENOUS
Refills: 0 | Status: DISCONTINUED | OUTPATIENT
Start: 2021-04-26 | End: 2021-04-30

## 2021-04-26 RX ORDER — SODIUM CHLORIDE 9 MG/ML
3 INJECTION INTRAMUSCULAR; INTRAVENOUS; SUBCUTANEOUS EVERY 8 HOURS
Refills: 0 | Status: DISCONTINUED | OUTPATIENT
Start: 2021-04-26 | End: 2021-04-26

## 2021-04-26 RX ORDER — HYDROMORPHONE HYDROCHLORIDE 2 MG/ML
0.25 INJECTION INTRAMUSCULAR; INTRAVENOUS; SUBCUTANEOUS
Refills: 0 | Status: DISCONTINUED | OUTPATIENT
Start: 2021-04-26 | End: 2021-04-26

## 2021-04-26 RX ORDER — ACETAMINOPHEN 500 MG
650 TABLET ORAL EVERY 6 HOURS
Refills: 0 | Status: DISCONTINUED | OUTPATIENT
Start: 2021-04-26 | End: 2021-04-30

## 2021-04-26 RX ORDER — CHLORHEXIDINE GLUCONATE 213 G/1000ML
1 SOLUTION TOPICAL ONCE
Refills: 0 | Status: DISCONTINUED | OUTPATIENT
Start: 2021-04-26 | End: 2021-04-26

## 2021-04-26 RX ORDER — OMEPRAZOLE 10 MG/1
1 CAPSULE, DELAYED RELEASE ORAL
Qty: 0 | Refills: 0 | DISCHARGE

## 2021-04-26 RX ORDER — HYDROMORPHONE HYDROCHLORIDE 2 MG/ML
0.5 INJECTION INTRAMUSCULAR; INTRAVENOUS; SUBCUTANEOUS
Refills: 0 | Status: DISCONTINUED | OUTPATIENT
Start: 2021-04-26 | End: 2021-04-26

## 2021-04-26 RX ORDER — GLUCAGON INJECTION, SOLUTION 0.5 MG/.1ML
1 INJECTION, SOLUTION SUBCUTANEOUS ONCE
Refills: 0 | Status: DISCONTINUED | OUTPATIENT
Start: 2021-04-26 | End: 2021-04-30

## 2021-04-26 RX ORDER — DEXTROSE 50 % IN WATER 50 %
15 SYRINGE (ML) INTRAVENOUS ONCE
Refills: 0 | Status: DISCONTINUED | OUTPATIENT
Start: 2021-04-26 | End: 2021-04-30

## 2021-04-26 RX ORDER — OXYCODONE HYDROCHLORIDE 5 MG/1
10 TABLET ORAL EVERY 4 HOURS
Refills: 0 | Status: DISCONTINUED | OUTPATIENT
Start: 2021-04-26 | End: 2021-04-30

## 2021-04-26 RX ORDER — SOLIFENACIN SUCCINATE 10 MG/1
1 TABLET ORAL
Qty: 0 | Refills: 0 | DISCHARGE

## 2021-04-26 RX ORDER — LEVOTHYROXINE SODIUM 125 MCG
300 TABLET ORAL DAILY
Refills: 0 | Status: DISCONTINUED | OUTPATIENT
Start: 2021-04-26 | End: 2021-04-30

## 2021-04-26 RX ORDER — ACETAMINOPHEN 500 MG
1000 TABLET ORAL ONCE
Refills: 0 | Status: COMPLETED | OUTPATIENT
Start: 2021-04-26 | End: 2021-04-26

## 2021-04-26 RX ORDER — LEVOTHYROXINE SODIUM 125 MCG
1 TABLET ORAL
Qty: 0 | Refills: 0 | DISCHARGE

## 2021-04-26 RX ORDER — ROSUVASTATIN CALCIUM 5 MG/1
1 TABLET ORAL
Qty: 0 | Refills: 0 | DISCHARGE

## 2021-04-26 RX ORDER — INSULIN LISPRO 100/ML
VIAL (ML) SUBCUTANEOUS
Refills: 0 | Status: DISCONTINUED | OUTPATIENT
Start: 2021-04-26 | End: 2021-04-30

## 2021-04-26 RX ORDER — SODIUM CHLORIDE 9 MG/ML
1000 INJECTION INTRAMUSCULAR; INTRAVENOUS; SUBCUTANEOUS
Refills: 0 | Status: DISCONTINUED | OUTPATIENT
Start: 2021-04-26 | End: 2021-04-30

## 2021-04-26 RX ORDER — EZETIMIBE 10 MG/1
1 TABLET ORAL
Qty: 0 | Refills: 0 | DISCHARGE

## 2021-04-26 RX ADMIN — HYDROMORPHONE HYDROCHLORIDE 0.5 MILLIGRAM(S): 2 INJECTION INTRAMUSCULAR; INTRAVENOUS; SUBCUTANEOUS at 16:00

## 2021-04-26 RX ADMIN — HYDROMORPHONE HYDROCHLORIDE 0.5 MILLIGRAM(S): 2 INJECTION INTRAMUSCULAR; INTRAVENOUS; SUBCUTANEOUS at 13:40

## 2021-04-26 RX ADMIN — Medication 400 MILLIGRAM(S): at 22:06

## 2021-04-26 RX ADMIN — Medication 4: at 17:29

## 2021-04-26 RX ADMIN — HYDROMORPHONE HYDROCHLORIDE 0.5 MILLIGRAM(S): 2 INJECTION INTRAMUSCULAR; INTRAVENOUS; SUBCUTANEOUS at 14:00

## 2021-04-26 RX ADMIN — Medication 10 MILLIGRAM(S): at 20:12

## 2021-04-26 RX ADMIN — Medication 100 MILLIGRAM(S): at 21:16

## 2021-04-26 RX ADMIN — SODIUM CHLORIDE 75 MILLILITER(S): 9 INJECTION INTRAMUSCULAR; INTRAVENOUS; SUBCUTANEOUS at 15:39

## 2021-04-26 RX ADMIN — OXYCODONE HYDROCHLORIDE 10 MILLIGRAM(S): 5 TABLET ORAL at 20:42

## 2021-04-26 RX ADMIN — HYDROMORPHONE HYDROCHLORIDE 0.5 MILLIGRAM(S): 2 INJECTION INTRAMUSCULAR; INTRAVENOUS; SUBCUTANEOUS at 15:38

## 2021-04-26 RX ADMIN — Medication 1000 MILLIGRAM(S): at 22:36

## 2021-04-26 RX ADMIN — OXYCODONE HYDROCHLORIDE 10 MILLIGRAM(S): 5 TABLET ORAL at 20:12

## 2021-04-26 RX ADMIN — ATORVASTATIN CALCIUM 80 MILLIGRAM(S): 80 TABLET, FILM COATED ORAL at 21:17

## 2021-04-26 NOTE — PHYSICAL THERAPY INITIAL EVALUATION ADULT - ADDITIONAL COMMENTS
[FreeTextEntry1] : Ms. Lamb has a history of PAH, PE, restrictive dysfunction, ?OSAS and Raynaud's. She is stable from a pulmonary perspective and is s/p Cataract Removal.\par \par \par problem 1: pulmonary arterial hypertension\par -under good control\par -continue to use Adcirca 40 mg QD\par -continue to use Opsumit 10 mg QD\par -LFTs every three months  (Stable)\par -Repeat Echocardiogram in late June (Dr. Elaine)\par \par -Disorder of the pulmonary arteries, important to distinguish the difference between pulmonary arterial hypertension which is idiopathic to secondary pulmonary hypertension which is related to heart disease being diastolic dysfunction or congestive heart failure. Diagnostics include an initial echocardiogram evaluating the pulmonary artery pressures, if this is abnormal, to proceed with a VQ scan as well as a CTPA and an eventual right heart catheterization (No medication can be prescribed until the right heart catheterization). If present, the evaluation will include rheumatological blood testing, HIV testing, and potential evaluation for cirrhosis. Drug classes include PED5 (Revatio, Adcirca) ETRA (Tracleer, Macitentan, Letairis), Soluble guanylate cyclase (Adempas) Prostacyclins (Uptravi, Tyavso, Ventavis, Remodulin, or Orenitram derivatives). \par \par problem 2: history of a pulmonary embolism\par -continue to use blood thinners (Pradaxa)\par -she is at risk for a embolism so she should continue on therapy\par \par \par problem 3: mild asthmatic condition/reactive airways disease\par -consistent with her abnormal PFTs\par -continue Anoro at 1 inhalation QD\par -continue to gargle and spit after using\par \par -Asthma is  believed to be caused by inherited (genetic) and environmental factor, but its exact cause is unknown. Asthma may be triggered by allergens, lung infections, or irritants in the air. Asthma triggers are different for each person\par -Inhaler technique reviewed as well as oral hygiene techniques reviewed with patient. Avoidance of cold air, extremes of temperature, rescue inhaler should be used before exercise. Order of medication reviewed with patient. Recommended use of a cool mist humidifier in the bedroom. \par \par problem 4: ?EMILY\par -no interest in any treating or diagnosing\par -recommended to use positional sleep \par \par -Sleep apnea is associated with adverse clinical consequences which an affect most organ systems.  Cardiovascular disease risk includes arrhythmias, atrial fibrillation, hypertension, coronary artery disease, and stroke. Metabolic disorders include diabetes type 2, non-alcoholic fatty liver disease. Mood disorder especially depression; and cognitive decline especially in the elderly. Associations with  chronic reflux/Melendez’s esophagus some but not all inclusive. \par -Reasons to assess this include arousal consistent with hypopnea; respiratory events most prominent in REM sleep or supine position; therefore sleep staging and body position are important for accurate diagnosis and estimation of AHI. \par \par problem 5: history of abnormal chest CT (stable) lung cancer screening \par -follow up high resolution chest CT in 5/2020 (prone and supine) \par \par problem 6: itching skin (improved)\par -recommended to use Borage and Flax seed oil \par \par Problem 7: Hypoxemia\par -she remains a candidate for Supplemental O2 therapy, pt denies this at this time \par \par problem 8: bronchiectasis\par -recommended acapella device \par -Bronchiectasis is a condition that results in irreversible damage to one or more of the conducting bronchi or airways. Its symptoms, include cough, daily production of mucopurulent sputum, dyspnea, and occasionally, episodic hemoptysis. Severe cases of bronchiectasis can result in progressive impairment with respiratory failure. Bronchiectasis is usually the result of severe or repeated respiratory infections, and most commonly presents as a focal process involving a lobe, segment, or sub-segment of the lung. For some patients, it may present as a diffuse process involving both lungs. Theses cases occur most often in patients with genetic, immunological, or anatomic defects that affect the airway. Diagnosis is usually based on a review of symptoms, including daily cough, and production of copious amounts of sputum, and characteristic CT scan findings, such as bronchial wall thickening and luminal dilatations. It is often treated with antibiotics and airway clearance measures such as chest physiotherapy. In addition, treatment of underlying disorders is important when possible. \par \par problem 9: scleroderma\par -recommended to continue to f/u with Dr. Palafox \par \par Problem 10: health maintenance\par -received 2018 flu shot\par -given Prevnar 13 vaccine in December 2017 ; Pneumovax 01/2019\par -recommended early intervention for URIs\par -recommended osteoporosis evaluations\par -recommended early dermatological evaluations\par -recommended after the age of 50 to the age of 70, colonoscopy every 5 years\par -encouraged early intervention\par \par \par F/U in 3 months with full PFTs and 6 minute walk\par She is encouraged to call with any changes, concerns, or questions.
Pt lives alone in a pvt home, 1 flight of stairs inside. Upon d/c pt will be staying with friend in a house, no steps to negotiate. PTA pt was independent w/ all mobility & did not use an AD for ambulation, although owns RW.

## 2021-04-26 NOTE — PACU DISCHARGE NOTE - NAUSEA/VOMITING:
11/17/2020 6:38 PM 
 
Mr. Sabas [address] [place] [address] [address] [facility] [address] You were seen for virtual visit on 4-2020. Was to have 6 month follow up in office in . I am getting 90 day supply refill request. Please call the office to have a follow up visit, and to let us know if you will have enough medication until your appt. Sincerely, Christiane Hinds MD 
 

None

## 2021-04-26 NOTE — PROGRESS NOTE ADULT - SUBJECTIVE AND OBJECTIVE BOX
Patient seen and examined in PACU postop, doing well with some incisional pain, Exam: AOx3, BUE 5/5, BLE 4+/5 effort corona, no radiculopathy, SILT, no clonus.     T(C): 36.2 (04-26-21 @ 12:30), Max: 36.9 (04-26-21 @ 06:00)  HR: 79 (04-26-21 @ 14:15) (77 - 88)  BP: 104/52 (04-26-21 @ 14:15) (100/49 - 115/56)  RR: 12 (04-26-21 @ 14:15) (12 - 17)  SpO2: 99% (04-26-21 @ 14:15) (96% - 100%)                          10.7   7.16  )-----------( 153      ( 26 Apr 2021 13:06 )             34.3     04-26    138  |  109<H>  |  16  ----------------------------<  201<H>  4.8   |  16<L>  |  0.86    Ca    8.4      26 Apr 2021 13:06              CAPILLARY BLOOD GLUCOSE      POCT Blood Glucose.: 206 mg/dL (26 Apr 2021 12:54)  POCT Blood Glucose.: 156 mg/dL (26 Apr 2021 06:29)

## 2021-04-27 LAB
ANION GAP SERPL CALC-SCNC: 10 MMOL/L — SIGNIFICANT CHANGE UP (ref 5–17)
BUN SERPL-MCNC: 17 MG/DL — SIGNIFICANT CHANGE UP (ref 7–23)
CALCIUM SERPL-MCNC: 8.6 MG/DL — SIGNIFICANT CHANGE UP (ref 8.4–10.5)
CHLORIDE SERPL-SCNC: 110 MMOL/L — HIGH (ref 96–108)
CO2 SERPL-SCNC: 19 MMOL/L — LOW (ref 22–31)
COVID-19 SPIKE DOMAIN AB INTERP: POSITIVE
COVID-19 SPIKE DOMAIN ANTIBODY RESULT: 199 U/ML — HIGH
CREAT SERPL-MCNC: 0.89 MG/DL — SIGNIFICANT CHANGE UP (ref 0.5–1.3)
GLUCOSE BLDC GLUCOMTR-MCNC: 171 MG/DL — HIGH (ref 70–99)
GLUCOSE BLDC GLUCOMTR-MCNC: 190 MG/DL — HIGH (ref 70–99)
GLUCOSE BLDC GLUCOMTR-MCNC: 197 MG/DL — HIGH (ref 70–99)
GLUCOSE BLDC GLUCOMTR-MCNC: 216 MG/DL — HIGH (ref 70–99)
GLUCOSE SERPL-MCNC: 108 MG/DL — HIGH (ref 70–99)
HCT VFR BLD CALC: 29.3 % — LOW (ref 34.5–45)
HGB BLD-MCNC: 9.3 G/DL — LOW (ref 11.5–15.5)
MCHC RBC-ENTMCNC: 28.3 PG — SIGNIFICANT CHANGE UP (ref 27–34)
MCHC RBC-ENTMCNC: 31.7 GM/DL — LOW (ref 32–36)
MCV RBC AUTO: 89.1 FL — SIGNIFICANT CHANGE UP (ref 80–100)
NRBC # BLD: 0 /100 WBCS — SIGNIFICANT CHANGE UP (ref 0–0)
PLATELET # BLD AUTO: 139 K/UL — LOW (ref 150–400)
POTASSIUM SERPL-MCNC: 4.5 MMOL/L — SIGNIFICANT CHANGE UP (ref 3.5–5.3)
POTASSIUM SERPL-SCNC: 4.5 MMOL/L — SIGNIFICANT CHANGE UP (ref 3.5–5.3)
RBC # BLD: 3.29 M/UL — LOW (ref 3.8–5.2)
RBC # FLD: 13.8 % — SIGNIFICANT CHANGE UP (ref 10.3–14.5)
SARS-COV-2 IGG+IGM SERPL QL IA: 199 U/ML — HIGH
SARS-COV-2 IGG+IGM SERPL QL IA: POSITIVE
SODIUM SERPL-SCNC: 139 MMOL/L — SIGNIFICANT CHANGE UP (ref 135–145)
WBC # BLD: 11.27 K/UL — HIGH (ref 3.8–10.5)
WBC # FLD AUTO: 11.27 K/UL — HIGH (ref 3.8–10.5)

## 2021-04-27 PROCEDURE — 93970 EXTREMITY STUDY: CPT | Mod: 26

## 2021-04-27 RX ORDER — POLYETHYLENE GLYCOL 3350 17 G/17G
17 POWDER, FOR SOLUTION ORAL DAILY
Refills: 0 | Status: DISCONTINUED | OUTPATIENT
Start: 2021-04-27 | End: 2021-04-30

## 2021-04-27 RX ORDER — SENNA PLUS 8.6 MG/1
2 TABLET ORAL AT BEDTIME
Refills: 0 | Status: DISCONTINUED | OUTPATIENT
Start: 2021-04-27 | End: 2021-04-30

## 2021-04-27 RX ORDER — ENOXAPARIN SODIUM 100 MG/ML
40 INJECTION SUBCUTANEOUS
Refills: 0 | Status: DISCONTINUED | OUTPATIENT
Start: 2021-04-27 | End: 2021-04-27

## 2021-04-27 RX ORDER — ENOXAPARIN SODIUM 100 MG/ML
40 INJECTION SUBCUTANEOUS
Refills: 0 | Status: DISCONTINUED | OUTPATIENT
Start: 2021-04-27 | End: 2021-04-30

## 2021-04-27 RX ORDER — CYCLOBENZAPRINE HYDROCHLORIDE 10 MG/1
5 TABLET, FILM COATED ORAL THREE TIMES A DAY
Refills: 0 | Status: DISCONTINUED | OUTPATIENT
Start: 2021-04-27 | End: 2021-04-30

## 2021-04-27 RX ADMIN — PANTOPRAZOLE SODIUM 40 MILLIGRAM(S): 20 TABLET, DELAYED RELEASE ORAL at 05:29

## 2021-04-27 RX ADMIN — OXYCODONE HYDROCHLORIDE 10 MILLIGRAM(S): 5 TABLET ORAL at 04:18

## 2021-04-27 RX ADMIN — OXYCODONE HYDROCHLORIDE 10 MILLIGRAM(S): 5 TABLET ORAL at 08:50

## 2021-04-27 RX ADMIN — OXYCODONE HYDROCHLORIDE 10 MILLIGRAM(S): 5 TABLET ORAL at 17:30

## 2021-04-27 RX ADMIN — SODIUM CHLORIDE 75 MILLILITER(S): 9 INJECTION INTRAMUSCULAR; INTRAVENOUS; SUBCUTANEOUS at 17:31

## 2021-04-27 RX ADMIN — OXYCODONE HYDROCHLORIDE 10 MILLIGRAM(S): 5 TABLET ORAL at 04:48

## 2021-04-27 RX ADMIN — Medication 145 MILLIGRAM(S): at 12:16

## 2021-04-27 RX ADMIN — Medication 100 MILLIGRAM(S): at 04:19

## 2021-04-27 RX ADMIN — Medication 10 MILLIGRAM(S): at 05:29

## 2021-04-27 RX ADMIN — LISINOPRIL 10 MILLIGRAM(S): 2.5 TABLET ORAL at 05:29

## 2021-04-27 RX ADMIN — Medication 100 MILLIGRAM(S): at 12:16

## 2021-04-27 RX ADMIN — OXYCODONE HYDROCHLORIDE 10 MILLIGRAM(S): 5 TABLET ORAL at 21:41

## 2021-04-27 RX ADMIN — Medication 10 MILLIGRAM(S): at 17:30

## 2021-04-27 RX ADMIN — ENOXAPARIN SODIUM 40 MILLIGRAM(S): 100 INJECTION SUBCUTANEOUS at 17:30

## 2021-04-27 RX ADMIN — Medication 2: at 12:47

## 2021-04-27 RX ADMIN — OXYCODONE HYDROCHLORIDE 10 MILLIGRAM(S): 5 TABLET ORAL at 12:46

## 2021-04-27 RX ADMIN — ATORVASTATIN CALCIUM 80 MILLIGRAM(S): 80 TABLET, FILM COATED ORAL at 21:43

## 2021-04-27 RX ADMIN — OXYCODONE HYDROCHLORIDE 10 MILLIGRAM(S): 5 TABLET ORAL at 08:20

## 2021-04-27 RX ADMIN — Medication 300 MICROGRAM(S): at 05:29

## 2021-04-27 RX ADMIN — SENNA PLUS 2 TABLET(S): 8.6 TABLET ORAL at 21:41

## 2021-04-27 RX ADMIN — OXYCODONE HYDROCHLORIDE 10 MILLIGRAM(S): 5 TABLET ORAL at 12:16

## 2021-04-27 RX ADMIN — CYCLOBENZAPRINE HYDROCHLORIDE 5 MILLIGRAM(S): 10 TABLET, FILM COATED ORAL at 10:53

## 2021-04-27 RX ADMIN — OXYCODONE HYDROCHLORIDE 10 MILLIGRAM(S): 5 TABLET ORAL at 22:10

## 2021-04-27 RX ADMIN — Medication 4: at 17:30

## 2021-04-27 RX ADMIN — Medication 2: at 08:01

## 2021-04-27 NOTE — OCCUPATIONAL THERAPY INITIAL EVALUATION ADULT - ANTICIPATED DISCHARGE DISPOSITION, OT EVAL
Home with no skilled OT needs, assist with ADLs as needed from friend who she will be staying with post discharge

## 2021-04-27 NOTE — OCCUPATIONAL THERAPY INITIAL EVALUATION ADULT - PERTINENT HX OF CURRENT PROBLEM, REHAB EVAL
66 y/o F with pmhx of ITP currently on Promacta s/p BMB w/ benign results, denies significant bleeding events in the past, T2DM, HTN, obesity, s/p thoracic spine fracture 2/2 chronic steroid use for ITP s/p T4-T6 kyphoplasty in 6/202 now presents with worsening severe thoracic spine pain, 8/10, limiting her ADLs and quality of life. See below

## 2021-04-27 NOTE — CHART NOTE - NSCHARTNOTEFT_GEN_A_CORE
CAPRINI SCORE [CLOT] Score on Admission for     AGE RELATED RISK FACTORS                                                       MOBILITY RELATED FACTORS  [ ] Age 41-60 years                                            (1 Point)                  [ ] Bed rest                                                        (1 Point)  [x ] Age: 61-74 years                                           (2 Points)                 [ ] Plaster cast                                                   (2 Points)  [ ] Age= 75 years                                              (3 Points)                 [ ] Bed bound for more than 72 hours                 (2 Points)    DISEASE RELATED RISK FACTORS                                               GENDER SPECIFIC FACTORS  [ ] Edema in the lower extremities                       (1 Point)                  [ ] Pregnancy                                                     (1 Point)  [ ] Varicose veins                                               (1 Point)                  [ ] Post-partum < 6 weeks                                   (1 Point)             [x ] BMI > 25 Kg/m2                                            (1 Point)                  [ ] Hormonal therapy  or oral contraception          (1 Point)                 [ ] Sepsis (in the previous month)                        (1 Point)            [ ] History of pregnancy complications                 (1 point)  [ ] Pneumonia or serious lung disease                                            [ ] Unexplained or recurrent                     (1 Point)           (in the previous month)                               (1 Point)  [ ] Abnormal pulmonary function test                     (1 Point)                 SURGERY RELATED RISK FACTORS (include planned surgeries)  [ ] Acute myocardial infarction                              (1 Point)                 [ ]  Section                                             (1 Point)  [ ] Congestive heart failure (in the previous month)  (1 Point)         [ ] Minor surgery                                                  (1 Point)   [ ] Inflammatory bowel disease                             (1 Point)                 [ ] Arthroscopic surgery                                        (2 Points)  [ ] Central venous access                                      (2 Points)                 [ x] General surgery lasting more than 45 minutes   (2 Points)       [ ] Stroke (in the previous month)                          (5 Points)               [ ] Elective arthroplasty                                         (5 Points)            [ ] current or past malignancy                              (2 Points)                                                                                                       HEMATOLOGY RELATED FACTORS                                                 TRAUMA RELATED RISK FACTORS  [ ] Prior episodes of VTE                                     (3 Points)                [ ] Fracture of the hip, pelvis, or leg                       (5 Points)  [ ] Positive family history for VTE                         (3 Points)             [ ] Acute spinal cord injury (in the previous month)  (5 Points)  [ ] Prothrombin 34647 A                                     (3 Points)                [ ] Paralysis  (less than 1 month)                             (5 Points)  [ ] Factor V Leiden                                             (3 Points)                  [ ] Multiple Trauma within 1 month                        (5 Points)  [ ] Lupus anticoagulants                                     (3 Points)                                                           [ ] Anticardiolipin antibodies                               (3 Points)                                                       [ ] High homocysteine in the blood                      (3 Points)                                             [ ] Other congenital or acquired thrombophilia      (3 Points)                                                [ ] Heparin induced thrombocytopenia                  (3 Points)                                          Total Score [    5      ]    Risk:  Very low 0   Low 1 to 2   Moderate 3 to 4   High =5       VTE Prophylaxis Recommendations:  [ x] mechanical pneumatic compression devices                                      [ ] contraindicated: _____________________  [ x] chemo prophylaxis                                                                                  [ ] contraindicated _____________________    **** HIGH LIKELIHOOD DVT PRESENT ON ADMISSION  [x ] (please order LE dopplers within 24 hours of admission)

## 2021-04-27 NOTE — PROGRESS NOTE ADULT - SUBJECTIVE AND OBJECTIVE BOX
CHIEF COMPLAINT: seen earlier at bedside, reports pain well controlled, wanted ramires out after sitting in chair    Vital Signs Last 24 Hrs  T(C): 36.8 (27 Apr 2021 09:54), Max: 36.9 (26 Apr 2021 22:15)  T(F): 98.3 (27 Apr 2021 09:54), Max: 98.5 (27 Apr 2021 05:19)  HR: 69 (27 Apr 2021 09:54) (65 - 76)  BP: 104/53 (27 Apr 2021 09:54) (103/65 - 132/58)  BP(mean): 84 (26 Apr 2021 17:30) (74 - 84)  RR: 18 (27 Apr 2021 09:54) (12 - 18)  SpO2: 93% (27 Apr 2021 09:54) (91% - 100%)    DRAINS: [] LAVERNE (cc/24h) [x] HMV (Right 205cc/24h, Left 245cc/24h)    PHYSICAL EXAM:    General: No Acute Distress     Neurological: Awake, alert oriented to person, place and time, Following Commands, PERRL, EOMI, Face Symmetrical, Speech Fluent, Moving all extremities, Muscle Strength normal in all four extremities, No Drift, Sensation to Light Touch Intact    Pulmonary: Clear to Auscultation, No Rales, No Rhonchi, No Wheezes     Cardiovascular: S1, S2, Regular Rate and Rhythm     Gastrointestinal: Soft, Nontender, Nondistended     Incision: +dressing. cdi, HMVCx2 in place    LABS:                        9.3    11.27 )-----------( 139      ( 27 Apr 2021 07:09 )             29.3    04-27    139  |  110<H>  |  17  ----------------------------<  108<H>  4.5   |  19<L>  |  0.89    Ca    8.6      27 Apr 2021 07:08    04-26 @ 07:01  -  04-27 @ 07:00  --------------------------------------------------------  IN: 1530 mL / OUT: 1930 mL / NET: -400 mL    04-27 @ 07:01 - 04-27 @ 15:26  --------------------------------------------------------  IN: 540 mL / OUT: 580 mL / NET: -40 mL    MEDICATIONS  (STANDING):  atorvastatin 80 milliGRAM(s) Oral at bedtime  dextrose 40% Gel 15 Gram(s) Oral once  dextrose 5%. 1000 milliLiter(s) (50 mL/Hr) IV Continuous <Continuous>  dextrose 5%. 1000 milliLiter(s) (100 mL/Hr) IV Continuous <Continuous>  dextrose 50% Injectable 25 Gram(s) IV Push once  dextrose 50% Injectable 12.5 Gram(s) IV Push once  dextrose 50% Injectable 25 Gram(s) IV Push once  enoxaparin Injectable 40 milliGRAM(s) SubCutaneous <User Schedule>  fenofibrate Tablet 145 milliGRAM(s) Oral daily  glucagon  Injectable 1 milliGRAM(s) IntraMuscular once  insulin lispro (ADMELOG) corrective regimen sliding scale   SubCutaneous three times a day before meals  insulin lispro (ADMELOG) corrective regimen sliding scale   SubCutaneous at bedtime  levothyroxine 300 MICROGram(s) Oral daily  lisinopril 10 milliGRAM(s) Oral daily  oxybutynin 10 milliGRAM(s) Oral two times a day  pantoprazole    Tablet 40 milliGRAM(s) Oral before breakfast  polyethylene glycol 3350 17 Gram(s) Oral daily  senna 2 Tablet(s) Oral at bedtime  sodium chloride 0.9%. 1000 milliLiter(s) (75 mL/Hr) IV Continuous <Continuous>    MEDICATIONS  (PRN):  acetaminophen   Tablet .. 650 milliGRAM(s) Oral every 6 hours PRN Temp greater or equal to 38.5C (101.3F), Mild Pain (1 - 3)  bisacodyl Suppository 10 milliGRAM(s) Rectal daily PRN Constipation  cyclobenzaprine 5 milliGRAM(s) Oral three times a day PRN Muscle Spasm  ondansetron Injectable 4 milliGRAM(s) IV Push every 6 hours PRN Nausea and/or Vomiting  oxyCODONE    IR 5 milliGRAM(s) Oral every 4 hours PRN Moderate Pain (4 - 6)  oxyCODONE    IR 10 milliGRAM(s) Oral every 4 hours PRN Severe Pain (7 - 10)    DIET: [] Regular [] CCD [] Renal [] Puree [] Dysphagia [] Tube Feeds:     IMAGING:    CHIEF COMPLAINT: seen earlier at bedside, reports pain well controlled, wanted ramires out after sitting in chair    Vital Signs Last 24 Hrs  T(C): 36.8 (27 Apr 2021 09:54), Max: 36.9 (26 Apr 2021 22:15)  T(F): 98.3 (27 Apr 2021 09:54), Max: 98.5 (27 Apr 2021 05:19)  HR: 69 (27 Apr 2021 09:54) (65 - 76)  BP: 104/53 (27 Apr 2021 09:54) (103/65 - 132/58)  BP(mean): 84 (26 Apr 2021 17:30) (74 - 84)  RR: 18 (27 Apr 2021 09:54) (12 - 18)  SpO2: 93% (27 Apr 2021 09:54) (91% - 100%)    DRAINS: [] LAVERNE (cc/24h) [x] HMV (Right 205cc/24h, Left 245cc/24h)    PHYSICAL EXAM:    General: No Acute Distress     Neurological: Awake, alert oriented to person, place and time, Following Commands, PERRL, EOMI, Face Symmetrical, Speech Fluent, Moving all extremities, Muscle Strength normal in all four extremities, No Drift, Sensation to Light Touch Intact    Pulmonary: Clear to Auscultation, No Rales, No Rhonchi, No Wheezes     Cardiovascular: S1, S2, Regular Rate and Rhythm     Gastrointestinal: Soft, Nontender, Nondistended     Incision: +dressing. cdi, HMVCx2 in place    LABS:                        9.3    11.27 )-----------( 139      ( 27 Apr 2021 07:09 )             29.3    04-27    139  |  110<H>  |  17  ----------------------------<  108<H>  4.5   |  19<L>  |  0.89    Ca    8.6      27 Apr 2021 07:08    04-26 @ 07:01  -  04-27 @ 07:00  --------------------------------------------------------  IN: 1530 mL / OUT: 1930 mL / NET: -400 mL    04-27 @ 07:01 - 04-27 @ 15:26  --------------------------------------------------------  IN: 540 mL / OUT: 580 mL / NET: -40 mL    MEDICATIONS  (STANDING):  atorvastatin 80 milliGRAM(s) Oral at bedtime  dextrose 40% Gel 15 Gram(s) Oral once  dextrose 5%. 1000 milliLiter(s) (50 mL/Hr) IV Continuous <Continuous>  dextrose 5%. 1000 milliLiter(s) (100 mL/Hr) IV Continuous <Continuous>  dextrose 50% Injectable 25 Gram(s) IV Push once  dextrose 50% Injectable 12.5 Gram(s) IV Push once  dextrose 50% Injectable 25 Gram(s) IV Push once  enoxaparin Injectable 40 milliGRAM(s) SubCutaneous <User Schedule>  fenofibrate Tablet 145 milliGRAM(s) Oral daily  glucagon  Injectable 1 milliGRAM(s) IntraMuscular once  insulin lispro (ADMELOG) corrective regimen sliding scale   SubCutaneous three times a day before meals  insulin lispro (ADMELOG) corrective regimen sliding scale   SubCutaneous at bedtime  levothyroxine 300 MICROGram(s) Oral daily  lisinopril 10 milliGRAM(s) Oral daily  oxybutynin 10 milliGRAM(s) Oral two times a day  pantoprazole    Tablet 40 milliGRAM(s) Oral before breakfast  polyethylene glycol 3350 17 Gram(s) Oral daily  senna 2 Tablet(s) Oral at bedtime  sodium chloride 0.9%. 1000 milliLiter(s) (75 mL/Hr) IV Continuous <Continuous>    MEDICATIONS  (PRN):  acetaminophen   Tablet .. 650 milliGRAM(s) Oral every 6 hours PRN Temp greater or equal to 38.5C (101.3F), Mild Pain (1 - 3)  bisacodyl Suppository 10 milliGRAM(s) Rectal daily PRN Constipation  cyclobenzaprine 5 milliGRAM(s) Oral three times a day PRN Muscle Spasm  ondansetron Injectable 4 milliGRAM(s) IV Push every 6 hours PRN Nausea and/or Vomiting  oxyCODONE    IR 5 milliGRAM(s) Oral every 4 hours PRN Moderate Pain (4 - 6)  oxyCODONE    IR 10 milliGRAM(s) Oral every 4 hours PRN Severe Pain (7 - 10)    DIET: [] Regular [x] CCD [] Renal [] Puree [] Dysphagia [] Tube Feeds:     IMAGING:   < from: [facility] Duplex Lower Ext Vein Scan, Bilat (04.27.21 @ 14:45) >  IMPRESSION:  No evidence of deep venous thrombosis in either lower extremity.    < end of copied text >

## 2021-04-27 NOTE — OCCUPATIONAL THERAPY INITIAL EVALUATION ADULT - ADDITIONAL COMMENTS
MRI Thoracic Spine: Moderate compression deformities of T4-T6 slightly worse at T4 and T6 with edema within these vertebra. No additional vertebral body compression noted. Multilevel spondylosis with exaggeration of the kyphosis centered at the T5 level. No retropulsion or cord compression.

## 2021-04-27 NOTE — OCCUPATIONAL THERAPY INITIAL EVALUATION ADULT - PRECAUTIONS/LIMITATIONS, REHAB EVAL
She denies taking pain medication and the only time she is not in pain is while lying on her left side. Presents for T3-T7 posterior stabilization and fusion. Pt S/p T3-7 posterior fusion/spinal precautions

## 2021-04-27 NOTE — OCCUPATIONAL THERAPY INITIAL EVALUATION ADULT - LIVES WITH, PROFILE
Pt lives alone in apartment, will be staying with friend in 1st floor apartment. Pt I in ADLs and ambulation prior to admission/alone

## 2021-04-28 LAB
ALBUMIN SERPL ELPH-MCNC: 2.9 G/DL — LOW (ref 3.3–5)
ALP SERPL-CCNC: 75 U/L — SIGNIFICANT CHANGE UP (ref 40–120)
ALT FLD-CCNC: 6 U/L — LOW (ref 10–45)
ANION GAP SERPL CALC-SCNC: 10 MMOL/L — SIGNIFICANT CHANGE UP (ref 5–17)
AST SERPL-CCNC: 10 U/L — SIGNIFICANT CHANGE UP (ref 10–40)
BASOPHILS # BLD AUTO: 0.04 K/UL — SIGNIFICANT CHANGE UP (ref 0–0.2)
BASOPHILS NFR BLD AUTO: 0.6 % — SIGNIFICANT CHANGE UP (ref 0–2)
BILIRUB SERPL-MCNC: 0.2 MG/DL — SIGNIFICANT CHANGE UP (ref 0.2–1.2)
BUN SERPL-MCNC: 23 MG/DL — SIGNIFICANT CHANGE UP (ref 7–23)
CALCIUM SERPL-MCNC: 8.6 MG/DL — SIGNIFICANT CHANGE UP (ref 8.4–10.5)
CHLORIDE SERPL-SCNC: 110 MMOL/L — HIGH (ref 96–108)
CO2 SERPL-SCNC: 20 MMOL/L — LOW (ref 22–31)
CREAT SERPL-MCNC: 1.04 MG/DL — SIGNIFICANT CHANGE UP (ref 0.5–1.3)
EOSINOPHIL # BLD AUTO: 0.22 K/UL — SIGNIFICANT CHANGE UP (ref 0–0.5)
EOSINOPHIL NFR BLD AUTO: 3.2 % — SIGNIFICANT CHANGE UP (ref 0–6)
GLUCOSE BLDC GLUCOMTR-MCNC: 118 MG/DL — HIGH (ref 70–99)
GLUCOSE BLDC GLUCOMTR-MCNC: 146 MG/DL — HIGH (ref 70–99)
GLUCOSE BLDC GLUCOMTR-MCNC: 147 MG/DL — HIGH (ref 70–99)
GLUCOSE BLDC GLUCOMTR-MCNC: 219 MG/DL — HIGH (ref 70–99)
GLUCOSE SERPL-MCNC: 138 MG/DL — HIGH (ref 70–99)
HCT VFR BLD CALC: 27.2 % — LOW (ref 34.5–45)
HGB BLD-MCNC: 8.4 G/DL — LOW (ref 11.5–15.5)
IMM GRANULOCYTES NFR BLD AUTO: 0.7 % — SIGNIFICANT CHANGE UP (ref 0–1.5)
LYMPHOCYTES # BLD AUTO: 1.34 K/UL — SIGNIFICANT CHANGE UP (ref 1–3.3)
LYMPHOCYTES # BLD AUTO: 19.7 % — SIGNIFICANT CHANGE UP (ref 13–44)
MCHC RBC-ENTMCNC: 27.9 PG — SIGNIFICANT CHANGE UP (ref 27–34)
MCHC RBC-ENTMCNC: 30.9 GM/DL — LOW (ref 32–36)
MCV RBC AUTO: 90.4 FL — SIGNIFICANT CHANGE UP (ref 80–100)
MONOCYTES # BLD AUTO: 0.64 K/UL — SIGNIFICANT CHANGE UP (ref 0–0.9)
MONOCYTES NFR BLD AUTO: 9.4 % — SIGNIFICANT CHANGE UP (ref 2–14)
NEUTROPHILS # BLD AUTO: 4.5 K/UL — SIGNIFICANT CHANGE UP (ref 1.8–7.4)
NEUTROPHILS NFR BLD AUTO: 66.4 % — SIGNIFICANT CHANGE UP (ref 43–77)
NRBC # BLD: 0 /100 WBCS — SIGNIFICANT CHANGE UP (ref 0–0)
PLATELET # BLD AUTO: 128 K/UL — LOW (ref 150–400)
POTASSIUM SERPL-MCNC: 4.5 MMOL/L — SIGNIFICANT CHANGE UP (ref 3.5–5.3)
POTASSIUM SERPL-SCNC: 4.5 MMOL/L — SIGNIFICANT CHANGE UP (ref 3.5–5.3)
PROT SERPL-MCNC: 4.9 G/DL — LOW (ref 6–8.3)
RBC # BLD: 3.01 M/UL — LOW (ref 3.8–5.2)
RBC # FLD: 14 % — SIGNIFICANT CHANGE UP (ref 10.3–14.5)
SODIUM SERPL-SCNC: 140 MMOL/L — SIGNIFICANT CHANGE UP (ref 135–145)
WBC # BLD: 6.79 K/UL — SIGNIFICANT CHANGE UP (ref 3.8–10.5)
WBC # FLD AUTO: 6.79 K/UL — SIGNIFICANT CHANGE UP (ref 3.8–10.5)

## 2021-04-28 RX ORDER — LISINOPRIL 2.5 MG/1
10 TABLET ORAL DAILY
Refills: 0 | Status: DISCONTINUED | OUTPATIENT
Start: 2021-04-28 | End: 2021-04-29

## 2021-04-28 RX ADMIN — OXYCODONE HYDROCHLORIDE 10 MILLIGRAM(S): 5 TABLET ORAL at 09:49

## 2021-04-28 RX ADMIN — Medication 4: at 12:49

## 2021-04-28 RX ADMIN — CYCLOBENZAPRINE HYDROCHLORIDE 5 MILLIGRAM(S): 10 TABLET, FILM COATED ORAL at 12:49

## 2021-04-28 RX ADMIN — ENOXAPARIN SODIUM 40 MILLIGRAM(S): 100 INJECTION SUBCUTANEOUS at 17:25

## 2021-04-28 RX ADMIN — Medication 300 MICROGRAM(S): at 06:19

## 2021-04-28 RX ADMIN — SENNA PLUS 2 TABLET(S): 8.6 TABLET ORAL at 21:36

## 2021-04-28 RX ADMIN — Medication 145 MILLIGRAM(S): at 12:49

## 2021-04-28 RX ADMIN — OXYCODONE HYDROCHLORIDE 10 MILLIGRAM(S): 5 TABLET ORAL at 22:20

## 2021-04-28 RX ADMIN — OXYCODONE HYDROCHLORIDE 10 MILLIGRAM(S): 5 TABLET ORAL at 14:19

## 2021-04-28 RX ADMIN — ATORVASTATIN CALCIUM 80 MILLIGRAM(S): 80 TABLET, FILM COATED ORAL at 21:36

## 2021-04-28 RX ADMIN — POLYETHYLENE GLYCOL 3350 17 GRAM(S): 17 POWDER, FOR SOLUTION ORAL at 12:50

## 2021-04-28 RX ADMIN — OXYCODONE HYDROCHLORIDE 10 MILLIGRAM(S): 5 TABLET ORAL at 10:19

## 2021-04-28 RX ADMIN — PANTOPRAZOLE SODIUM 40 MILLIGRAM(S): 20 TABLET, DELAYED RELEASE ORAL at 06:20

## 2021-04-28 RX ADMIN — Medication 10 MILLIGRAM(S): at 06:20

## 2021-04-28 RX ADMIN — OXYCODONE HYDROCHLORIDE 10 MILLIGRAM(S): 5 TABLET ORAL at 14:49

## 2021-04-28 RX ADMIN — OXYCODONE HYDROCHLORIDE 10 MILLIGRAM(S): 5 TABLET ORAL at 21:50

## 2021-04-28 RX ADMIN — LISINOPRIL 10 MILLIGRAM(S): 2.5 TABLET ORAL at 06:19

## 2021-04-28 RX ADMIN — Medication 10 MILLIGRAM(S): at 17:24

## 2021-04-28 NOTE — PROGRESS NOTE ADULT - SUBJECTIVE AND OBJECTIVE BOX
SUBJECTIVE:   Doing well. Ambulated w PT   OVERNIGHT EVENTS: none    Vital Signs Last 24 Hrs  T(C): 36.7 (28 Apr 2021 13:48), Max: 37.1 (27 Apr 2021 23:58)  T(F): 98 (28 Apr 2021 13:48), Max: 98.7 (27 Apr 2021 23:58)  HR: 73 (28 Apr 2021 13:48) (70 - 88)  BP: 100/56 (28 Apr 2021 13:48) (100/56 - 124/82)  BP(mean): --  RR: 18 (28 Apr 2021 13:48) (17 - 18)  SpO2: 94% (28 Apr 2021 13:48) (93% - 95%)    PHYSICAL EXAM:    Constitutional: No Acute Distress     Neurological: Awake alert Ox3, Speech clear Following Commands, Moving all Extremities 5/5. Sensation intact. Lumbar dressing C/D./I R  L HMV 40cc       Pulmonary: Clear to Auscultation,    Cardiovascular: S1, S2, Regular rate and rhythm     Gastrointestinal: Soft, Non-tender, Non-distended     Extremities: No calf tenderness     LABS:                        8.4    6.79  )-----------( 128      ( 28 Apr 2021 07:24 )             27.2    04-28    140  |  110<H>  |  23  ----------------------------<  138<H>  4.5   |  20<L>  |  1.04    Ca    8.6      28 Apr 2021 07:24    TPro  4.9<L>  /  Alb  2.9<L>  /  TBili  0.2  /  DBili  x   /  AST  10  /  ALT  6<L>  /  AlkPhos  75  04-28          IMAGING:         MEDICATIONS:    acetaminophen   Tablet .. 650 milliGRAM(s) Oral every 6 hours PRN Temp greater or equal to 38.5C (101.3F), Mild Pain (1 - 3)  cyclobenzaprine 5 milliGRAM(s) Oral three times a day PRN Muscle Spasm  ondansetron Injectable 4 milliGRAM(s) IV Push every 6 hours PRN Nausea and/or Vomiting  oxyCODONE    IR 10 milliGRAM(s) Oral every 4 hours PRN Severe Pain (7 - 10)  oxyCODONE    IR 5 milliGRAM(s) Oral every 4 hours PRN Moderate Pain (4 - 6)  lisinopril 10 milliGRAM(s) Oral daily  bisacodyl Suppository 10 milliGRAM(s) Rectal daily PRN Constipation  oxybutynin 10 milliGRAM(s) Oral two times a day  pantoprazole    Tablet 40 milliGRAM(s) Oral before breakfast  polyethylene glycol 3350 17 Gram(s) Oral daily  senna 2 Tablet(s) Oral at bedtime  atorvastatin 80 milliGRAM(s) Oral at bedtime  dextrose 50% Injectable 25 Gram(s) IV Push once  Eltrombopag 12.5 milliGRAM(s) 12.5 milliGRAM(s) Oral <User Schedule>  enoxaparin Injectable 40 milliGRAM(s) SubCutaneous <User Schedule>  fenofibrate Tablet 145 milliGRAM(s) Oral daily  glucagon  Injectable 1 milliGRAM(s) IntraMuscular once  insulin lispro (ADMELOG) corrective regimen sliding scale   SubCutaneous three times a day before meals  insulin lispro (ADMELOG) corrective regimen sliding scale   SubCutaneous at bedtime  levothyroxine 300 MICROGram(s) Oral daily  sodium chloride 0.9%. 1000 milliLiter(s) IV Continuous <Continuous>      DIET:

## 2021-04-28 NOTE — PROGRESS NOTE ADULT - SUBJECTIVE AND OBJECTIVE BOX
HPI:  64 y/o female with pmhx of ITP currently on Promacta s/p BMB w/ benign results, denies significant bleeding events in the past, T2DM, HTN, obesity, s/p thoracic spine fracture 2/2 chronic steroid use for ITP s/p T4-T6 kyphoplasty in 6/202 now presents with worsening severe thoracic spine pain, 8/10, limiting her ADLs and quality of life. She denies taking pain medication and the only time she is not in pain is while lying on her left side. Presents for T3-T7 posterior stabilization and fusion.       *covid swab scheduled for 4/23 at [facility]  2nd Pfizer dose - 4/10/21 (05 Apr 2021 10:04)    OVERNIGHT EVENTS:  Vital Signs Last 24 Hrs  T(C): 36.4 (28 Apr 2021 08:25), Max: 37.1 (27 Apr 2021 23:58)  T(F): 97.6 (28 Apr 2021 08:25), Max: 98.7 (27 Apr 2021 23:58)  HR: 88 (28 Apr 2021 08:25) (70 - 88)  BP: 124/82 (28 Apr 2021 05:12) (100/56 - 124/82)  BP(mean): --  RR: 17 (28 Apr 2021 08:25) (17 - 18)  SpO2: 93% (28 Apr 2021 08:25) (93% - 95%)    I&O's Detail    27 Apr 2021 07:01  -  28 Apr 2021 07:00  --------------------------------------------------------  IN:    Oral Fluid: 1380 mL    sodium chloride 0.9%: 525 mL  Total IN: 1905 mL    OUT:    Accordian (mL): 40 mL    Accordian (mL): 390 mL    Indwelling Catheter - Urethral (mL): 400 mL    Voided (mL): 600 mL  Total OUT: 1430 mL    Total NET: 475 mL      28 Apr 2021 07:01  -  28 Apr 2021 11:08  --------------------------------------------------------  IN:    Oral Fluid: 240 mL  Total IN: 240 mL    OUT:    Voided (mL): 600 mL  Total OUT: 600 mL    Total NET: -360 mL        I&O's Summary    27 Apr 2021 07:01  -  28 Apr 2021 07:00  --------------------------------------------------------  IN: 1905 mL / OUT: 1430 mL / NET: 475 mL    28 Apr 2021 07:01  -  28 Apr 2021 11:08  --------------------------------------------------------  IN: 240 mL / OUT: 600 mL / NET: -360 mL        PHYSICAL EXAM:  Neurological:    Motor exam:         [x] Upper extremity                 D             B          T          WE       WF      HI                                               R         5/5        5/5        5/5       5/5     5/5       5/5                                               L          5/5        5/5        5/5       5/5     5/5       5/5         [x] Lower extremity                Ps          Ha        Quad    EHL        FHL                                               R        5/5        5/5        5/5       5/5         5/5                                               L         5/5        5/5       5/5       5/5          5/5                                                        [] warm well perfused; capillary refill <3 seconds     Sensation: [x] intact to light touch  [] decreased:     Gait Ambulating with PT    Cardiovascular:  Respiratory:  Gastrointestinal:  Genitourinary:  Extremities:  Incision/Wound: Clean and dry    TUBES/LINES:  [] CVC  [] A-line  [] Lumbar Drain  [] Ventriculostomy  [] Other    DIET:  [] NPO  [] Mechanical  [] Tube feeds    LABS:                        8.4    6.79  )-----------( 128      ( 28 Apr 2021 07:24 )             27.2     04-28    140  |  110<H>  |  23  ----------------------------<  138<H>  4.5   |  20<L>  |  1.04    Ca    8.6      28 Apr 2021 07:24    TPro  4.9<L>  /  Alb  2.9<L>  /  TBili  0.2  /  DBili  x   /  AST  10  /  ALT  6<L>  /  AlkPhos  75  04-28            CAPILLARY BLOOD GLUCOSE      POCT Blood Glucose.: 146 mg/dL (28 Apr 2021 08:48)  POCT Blood Glucose.: 190 mg/dL (27 Apr 2021 21:44)  POCT Blood Glucose.: 216 mg/dL (27 Apr 2021 17:24)  POCT Blood Glucose.: 197 mg/dL (27 Apr 2021 12:27)          Drug Levels: [] N/A    CSF Analysis: [] N/A      Allergies    Cipro (Hives)  coconuts (Hives)  fluoroquinolone antibiotics (Hives)  raspberries &amp; blueberries (Hives)  splenda (Hives)  Vitamin D (Hives)    Intolerances      MEDICATIONS:  Antibiotics:    Neuro:  acetaminophen   Tablet .. 650 milliGRAM(s) Oral every 6 hours PRN  cyclobenzaprine 5 milliGRAM(s) Oral three times a day PRN  ondansetron Injectable 4 milliGRAM(s) IV Push every 6 hours PRN  oxyCODONE    IR 10 milliGRAM(s) Oral every 4 hours PRN  oxyCODONE    IR 5 milliGRAM(s) Oral every 4 hours PRN    Anticoagulation:  enoxaparin Injectable 40 milliGRAM(s) SubCutaneous <User Schedule>    OTHER:  atorvastatin 80 milliGRAM(s) Oral at bedtime  bisacodyl Suppository 10 milliGRAM(s) Rectal daily PRN  dextrose 40% Gel 15 Gram(s) Oral once  dextrose 50% Injectable 25 Gram(s) IV Push once  dextrose 50% Injectable 12.5 Gram(s) IV Push once  dextrose 50% Injectable 25 Gram(s) IV Push once  Eltrombopag 12.5 milliGRAM(s) 12.5 milliGRAM(s) Oral <User Schedule>  fenofibrate Tablet 145 milliGRAM(s) Oral daily  glucagon  Injectable 1 milliGRAM(s) IntraMuscular once  insulin lispro (ADMELOG) corrective regimen sliding scale   SubCutaneous three times a day before meals  insulin lispro (ADMELOG) corrective regimen sliding scale   SubCutaneous at bedtime  levothyroxine 300 MICROGram(s) Oral daily  lisinopril 10 milliGRAM(s) Oral daily  oxybutynin 10 milliGRAM(s) Oral two times a day  pantoprazole    Tablet 40 milliGRAM(s) Oral before breakfast  polyethylene glycol 3350 17 Gram(s) Oral daily  senna 2 Tablet(s) Oral at bedtime    IVF:  dextrose 5%. 1000 milliLiter(s) IV Continuous <Continuous>  dextrose 5%. 1000 milliLiter(s) IV Continuous <Continuous>  sodium chloride 0.9%. 1000 milliLiter(s) IV Continuous <Continuous>    CULTURES:    RADIOLOGY & ADDITIONAL TESTS:      ASSESSMENT:  HPI:  64 y/o female with pmhx of ITP currently on Promacta s/p BMB w/ benign results, denies significant bleeding events in the past, T2DM, HTN, obesity, s/p thoracic spine fracture 2/2 chronic steroid use for ITP s/p T4-T6 kyphoplasty in 6/202 now presents with worsening severe thoracic spine pain, 8/10, limiting her ADLs and quality of life. She denies taking pain medication and the only time she is not in pain is while lying on her left side. Presents for T3-T7 posterior stabilization and fusion.       *covid swab scheduled for 4/23 at [facility]  2nd Pfizer dose - 4/10/21 (05 Apr 2021 10:04)    65y Female s/p    PLAN:  NEURO:  CARDIOVASCULAR:  PULMONARY:  RENAL:  GI:  HEME:  ID:  ENDO:    DVT PROPHYLAXIS:  [x] Venodynes                                [x] Heparin/Lovenox    FALL RISK:  [] Low Risk                                    [] Impulsive

## 2021-04-29 LAB
ANION GAP SERPL CALC-SCNC: 11 MMOL/L — SIGNIFICANT CHANGE UP (ref 5–17)
BUN SERPL-MCNC: 25 MG/DL — HIGH (ref 7–23)
CALCIUM SERPL-MCNC: 8.9 MG/DL — SIGNIFICANT CHANGE UP (ref 8.4–10.5)
CHLORIDE SERPL-SCNC: 105 MMOL/L — SIGNIFICANT CHANGE UP (ref 96–108)
CO2 SERPL-SCNC: 21 MMOL/L — LOW (ref 22–31)
CREAT SERPL-MCNC: 0.96 MG/DL — SIGNIFICANT CHANGE UP (ref 0.5–1.3)
GLUCOSE BLDC GLUCOMTR-MCNC: 135 MG/DL — HIGH (ref 70–99)
GLUCOSE BLDC GLUCOMTR-MCNC: 167 MG/DL — HIGH (ref 70–99)
GLUCOSE BLDC GLUCOMTR-MCNC: 192 MG/DL — HIGH (ref 70–99)
GLUCOSE BLDC GLUCOMTR-MCNC: 198 MG/DL — HIGH (ref 70–99)
GLUCOSE SERPL-MCNC: 125 MG/DL — HIGH (ref 70–99)
HCT VFR BLD CALC: 30.1 % — LOW (ref 34.5–45)
HGB BLD-MCNC: 9.4 G/DL — LOW (ref 11.5–15.5)
MCHC RBC-ENTMCNC: 27.9 PG — SIGNIFICANT CHANGE UP (ref 27–34)
MCHC RBC-ENTMCNC: 31.2 GM/DL — LOW (ref 32–36)
MCV RBC AUTO: 89.3 FL — SIGNIFICANT CHANGE UP (ref 80–100)
NRBC # BLD: 0 /100 WBCS — SIGNIFICANT CHANGE UP (ref 0–0)
PLATELET # BLD AUTO: 157 K/UL — SIGNIFICANT CHANGE UP (ref 150–400)
POTASSIUM SERPL-MCNC: 4.5 MMOL/L — SIGNIFICANT CHANGE UP (ref 3.5–5.3)
POTASSIUM SERPL-SCNC: 4.5 MMOL/L — SIGNIFICANT CHANGE UP (ref 3.5–5.3)
RBC # BLD: 3.37 M/UL — LOW (ref 3.8–5.2)
RBC # FLD: 13.9 % — SIGNIFICANT CHANGE UP (ref 10.3–14.5)
SODIUM SERPL-SCNC: 137 MMOL/L — SIGNIFICANT CHANGE UP (ref 135–145)
WBC # BLD: 8.48 K/UL — SIGNIFICANT CHANGE UP (ref 3.8–10.5)
WBC # FLD AUTO: 8.48 K/UL — SIGNIFICANT CHANGE UP (ref 3.8–10.5)

## 2021-04-29 RX ADMIN — Medication 10 MILLIGRAM(S): at 17:18

## 2021-04-29 RX ADMIN — PANTOPRAZOLE SODIUM 40 MILLIGRAM(S): 20 TABLET, DELAYED RELEASE ORAL at 05:44

## 2021-04-29 RX ADMIN — Medication 300 MICROGRAM(S): at 05:24

## 2021-04-29 RX ADMIN — ENOXAPARIN SODIUM 40 MILLIGRAM(S): 100 INJECTION SUBCUTANEOUS at 17:18

## 2021-04-29 RX ADMIN — OXYCODONE HYDROCHLORIDE 10 MILLIGRAM(S): 5 TABLET ORAL at 20:50

## 2021-04-29 RX ADMIN — POLYETHYLENE GLYCOL 3350 17 GRAM(S): 17 POWDER, FOR SOLUTION ORAL at 13:08

## 2021-04-29 RX ADMIN — SENNA PLUS 2 TABLET(S): 8.6 TABLET ORAL at 21:06

## 2021-04-29 RX ADMIN — ATORVASTATIN CALCIUM 80 MILLIGRAM(S): 80 TABLET, FILM COATED ORAL at 21:06

## 2021-04-29 RX ADMIN — Medication 2: at 10:57

## 2021-04-29 RX ADMIN — Medication 10 MILLIGRAM(S): at 05:24

## 2021-04-29 RX ADMIN — OXYCODONE HYDROCHLORIDE 10 MILLIGRAM(S): 5 TABLET ORAL at 20:20

## 2021-04-29 RX ADMIN — Medication 10 MILLIGRAM(S): at 17:19

## 2021-04-29 RX ADMIN — Medication 145 MILLIGRAM(S): at 13:08

## 2021-04-29 RX ADMIN — Medication 2: at 13:13

## 2021-04-29 NOTE — PROGRESS NOTE ADULT - TIME BILLING
Pt doing well.  Ambulating independently.  Back pain improved.  One hemovac DC'ed.  Anticipate DC home tomorrow.
Pt doing well.  Continue hemovacs and PT.  DC planning.

## 2021-04-29 NOTE — PROGRESS NOTE ADULT - SUBJECTIVE AND OBJECTIVE BOX
HPI:  66 y/o female with pmhx of ITP currently on Promacta s/p BMB w/ benign results, denies significant bleeding events in the past, T2DM, HTN, obesity, s/p thoracic spine fracture 2/2 chronic steroid use for ITP s/p T4-T6 kyphoplasty in 6/202 now presents with worsening severe thoracic spine pain, 8/10, limiting her ADLs and quality of life. She denies taking pain medication and the only time she is not in pain is while lying on her left side. Presents for T3-T7 posterior stabilization and fusion.       *covid swab scheduled for 4/23 at [facility]  2nd Pfizer dose - 4/10/21 (05 Apr 2021 10:04)    OVERNIGHT EVENTS:  Vital Signs Last 24 Hrs  T(C): 36.3 (29 Apr 2021 08:36), Max: 36.9 (29 Apr 2021 00:05)  T(F): 97.4 (29 Apr 2021 08:36), Max: 98.5 (29 Apr 2021 00:05)  HR: 85 (29 Apr 2021 08:36) (73 - 85)  BP: 121/74 (29 Apr 2021 08:36) (100/56 - 121/74)  BP(mean): --  RR: 18 (29 Apr 2021 08:36) (17 - 18)  SpO2: 95% (29 Apr 2021 08:36) (94% - 96%)    I&O's Detail    28 Apr 2021 07:01  -  29 Apr 2021 07:00  --------------------------------------------------------  IN:    Oral Fluid: 1560 mL  Total IN: 1560 mL    OUT:    Accordian (mL): 35 mL    Accordian (mL): 5 mL    Voided (mL): 1750 mL  Total OUT: 1790 mL    Total NET: -230 mL      29 Apr 2021 07:01  -  29 Apr 2021 11:26  --------------------------------------------------------  IN:    Oral Fluid: 300 mL  Total IN: 300 mL    OUT:    Accordian (mL): 100 mL    Voided (mL): 500 mL  Total OUT: 600 mL    Total NET: -300 mL        I&O's Summary    28 Apr 2021 07:01  -  29 Apr 2021 07:00  --------------------------------------------------------  IN: 1560 mL / OUT: 1790 mL / NET: -230 mL    29 Apr 2021 07:01  -  29 Apr 2021 11:26  --------------------------------------------------------  IN: 300 mL / OUT: 600 mL / NET: -300 mL        PHYSICAL EXAM:  Neurological:    Motor exam:         [x] Upper extremity                 D             B          T          WE       WF      HI                                               R         5/5        5/5        5/5       5/5     5/5       5/5                                               L          5/5        5/5        5/5       5/5     5/5       5/5         [x] Lower extremity                Ps          Ha        Quad    EHL        FHL                                               R        5/5        5/5        5/5       5/5         5/5                                               L         5/5        5/5       5/5       5/5          5/5                                                        [] warm well perfused; capillary refill <3 seconds     Sensation: [x] intact to light touch  [] decreased:            Gait Ambulating independently    Cardiovascular:  Respiratory:  Gastrointestinal:  Genitourinary:  Extremities:  Incision/Wound: Clean and dry    TUBES/LINES:  [] CVC  [] A-line  [] Lumbar Drain  [] Ventriculostomy  [] Other    DIET:  [] NPO  [] Mechanical  [] Tube feeds    LABS:                        9.4    8.48  )-----------( 157      ( 29 Apr 2021 07:02 )             30.1     04-29    137  |  105  |  25<H>  ----------------------------<  125<H>  4.5   |  21<L>  |  0.96    Ca    8.9      29 Apr 2021 07:01    TPro  4.9<L>  /  Alb  2.9<L>  /  TBili  0.2  /  DBili  x   /  AST  10  /  ALT  6<L>  /  AlkPhos  75  04-28            CAPILLARY BLOOD GLUCOSE      POCT Blood Glucose.: 167 mg/dL (29 Apr 2021 10:44)  POCT Blood Glucose.: 147 mg/dL (28 Apr 2021 22:23)  POCT Blood Glucose.: 118 mg/dL (28 Apr 2021 17:57)  POCT Blood Glucose.: 219 mg/dL (28 Apr 2021 12:47)          Drug Levels: [] N/A    CSF Analysis: [] N/A      Allergies    Cipro (Hives)  coconuts (Hives)  fluoroquinolone antibiotics (Hives)  raspberries &amp; blueberries (Hives)  splenda (Hives)  Vitamin D (Hives)    Intolerances      MEDICATIONS:  Antibiotics:    Neuro:  acetaminophen   Tablet .. 650 milliGRAM(s) Oral every 6 hours PRN  cyclobenzaprine 5 milliGRAM(s) Oral three times a day PRN  ondansetron Injectable 4 milliGRAM(s) IV Push every 6 hours PRN  oxyCODONE    IR 10 milliGRAM(s) Oral every 4 hours PRN  oxyCODONE    IR 5 milliGRAM(s) Oral every 4 hours PRN    Anticoagulation:  enoxaparin Injectable 40 milliGRAM(s) SubCutaneous <User Schedule>    OTHER:  atorvastatin 80 milliGRAM(s) Oral at bedtime  bisacodyl Suppository 10 milliGRAM(s) Rectal daily PRN  dextrose 40% Gel 15 Gram(s) Oral once  dextrose 50% Injectable 25 Gram(s) IV Push once  dextrose 50% Injectable 12.5 Gram(s) IV Push once  dextrose 50% Injectable 25 Gram(s) IV Push once  Eltrombopag 12.5 milliGRAM(s) 12.5 milliGRAM(s) Oral <User Schedule>  fenofibrate Tablet 145 milliGRAM(s) Oral daily  glucagon  Injectable 1 milliGRAM(s) IntraMuscular once  insulin lispro (ADMELOG) corrective regimen sliding scale   SubCutaneous three times a day before meals  insulin lispro (ADMELOG) corrective regimen sliding scale   SubCutaneous at bedtime  levothyroxine 300 MICROGram(s) Oral daily  lisinopril 10 milliGRAM(s) Oral daily  oxybutynin 10 milliGRAM(s) Oral two times a day  pantoprazole    Tablet 40 milliGRAM(s) Oral before breakfast  polyethylene glycol 3350 17 Gram(s) Oral daily  senna 2 Tablet(s) Oral at bedtime    IVF:  dextrose 5%. 1000 milliLiter(s) IV Continuous <Continuous>  dextrose 5%. 1000 milliLiter(s) IV Continuous <Continuous>  sodium chloride 0.9%. 1000 milliLiter(s) IV Continuous <Continuous>    CULTURES:    RADIOLOGY & ADDITIONAL TESTS:      ASSESSMENT:  HPI:  66 y/o female with pmhx of ITP currently on Promacta s/p BMB w/ benign results, denies significant bleeding events in the past, T2DM, HTN, obesity, s/p thoracic spine fracture 2/2 chronic steroid use for ITP s/p T4-T6 kyphoplasty in 6/202 now presents with worsening severe thoracic spine pain, 8/10, limiting her ADLs and quality of life. She denies taking pain medication and the only time she is not in pain is while lying on her left side. Presents for T3-T7 posterior stabilization and fusion.       *covid swab scheduled for 4/23 at [facility]  2nd Pfizer dose - 4/10/21 (05 Apr 2021 10:04)    65y Female s/p    PLAN:  NEURO:  CARDIOVASCULAR:  PULMONARY:  RENAL:  GI:  HEME:  ID:  ENDO:    DVT PROPHYLAXIS:  [x] Venodynes                                [x] Heparin/Lovenox    FALL RISK:  [] Low Risk                                    [] Impulsive

## 2021-04-29 NOTE — PROGRESS NOTE ADULT - SUBJECTIVE AND OBJECTIVE BOX
SUBJECTIVE:     OVERNIGHT EVENTS: none    Vital Signs Last 24 Hrs  T(C): 36.3 (29 Apr 2021 08:36), Max: 36.9 (29 Apr 2021 00:05)  T(F): 97.4 (29 Apr 2021 08:36), Max: 98.5 (29 Apr 2021 00:05)  HR: 85 (29 Apr 2021 08:36) (73 - 85)  BP: 121/74 (29 Apr 2021 08:36) (100/56 - 121/74)  BP(mean): --  RR: 18 (29 Apr 2021 08:36) (17 - 18)  SpO2: 95% (29 Apr 2021 08:36) (94% - 96%)    PHYSICAL EXAM:    Neurological: Awake alert Ox3, Speech clear Following Commands, Moving all Extremities 5/5. Sensation intact. Upper back staples C/D/I.I R HMV 35cc/ 24 hrs  . 100cc/ 7am -11 am ,   L HMV 5cc / 24 hrs     Pulmonary: Clear to Auscultation,    Cardiovascular: S1, S2, Regular rate and rhythm     Gastrointestinal: Soft, Non-tender, Non-distended     Extremities: No calf tenderness       LABS:                        9.4    8.48  )-----------( 157      ( 29 Apr 2021 07:02 )             30.1    04-29    137  |  105  |  25<H>  ----------------------------<  125<H>  4.5   |  21<L>  |  0.96    Ca    8.9      29 Apr 2021 07:01    TPro  4.9<L>  /  Alb  2.9<L>  /  TBili  0.2  /  DBili  x   /  AST  10  /  ALT  6<L>  /  AlkPhos  75  04-28    IMAGING:         MEDICATIONS:    acetaminophen   Tablet .. 650 milliGRAM(s) Oral every 6 hours PRN Temp greater or equal to 38.5C (101.3F), Mild Pain (1 - 3)  cyclobenzaprine 5 milliGRAM(s) Oral three times a day PRN Muscle Spasm  ondansetron Injectable 4 milliGRAM(s) IV Push every 6 hours PRN Nausea and/or Vomiting  oxyCODONE    IR 10 milliGRAM(s) Oral every 4 hours PRN Severe Pain (7 - 10)  oxyCODONE    IR 5 milliGRAM(s) Oral every 4 hours PRN Moderate Pain (4 - 6)  lisinopril 10 milliGRAM(s) Oral daily  bisacodyl Suppository 10 milliGRAM(s) Rectal daily PRN Constipation  oxybutynin 10 milliGRAM(s) Oral two times a day  pantoprazole    Tablet 40 milliGRAM(s) Oral before breakfast  polyethylene glycol 3350 17 Gram(s) Oral daily  senna 2 Tablet(s) Oral at bedtime  atorvastatin 80 milliGRAM(s) Oral at bedtime  Eltrombopag 12.5 milliGRAM(s) 12.5 milliGRAM(s) Oral <User Schedule>  enoxaparin Injectable 40 milliGRAM(s) SubCutaneous <User Schedule>  fenofibrate Tablet 145 milliGRAM(s) Oral daily  glucagon  Injectable 1 milliGRAM(s) IntraMuscular once  insulin lispro (ADMELOG) corrective regimen sliding scale   SubCutaneous three times a day before meals  insulin lispro (ADMELOG) corrective regimen sliding scale   SubCutaneous at bedtime  levothyroxine 300 MICROGram(s) Oral daily  sodium chloride 0.9%. 1000 milliLiter(s) IV Continuous <Continuous>      DIET:

## 2021-04-30 ENCOUNTER — TRANSCRIPTION ENCOUNTER (OUTPATIENT)
Age: 66
End: 2021-04-30

## 2021-04-30 LAB
GLUCOSE BLDC GLUCOMTR-MCNC: 147 MG/DL — HIGH (ref 70–99)
GLUCOSE BLDC GLUCOMTR-MCNC: 223 MG/DL — HIGH (ref 70–99)

## 2021-04-30 PROCEDURE — U0003: CPT

## 2021-04-30 PROCEDURE — C9803: CPT

## 2021-04-30 PROCEDURE — 82962 GLUCOSE BLOOD TEST: CPT

## 2021-04-30 PROCEDURE — 97161 PT EVAL LOW COMPLEX 20 MIN: CPT

## 2021-04-30 PROCEDURE — C1713: CPT

## 2021-04-30 PROCEDURE — 80053 COMPREHEN METABOLIC PANEL: CPT

## 2021-04-30 PROCEDURE — 97116 GAIT TRAINING THERAPY: CPT

## 2021-04-30 PROCEDURE — 80048 BASIC METABOLIC PNL TOTAL CA: CPT

## 2021-04-30 PROCEDURE — C1889: CPT

## 2021-04-30 PROCEDURE — 97165 OT EVAL LOW COMPLEX 30 MIN: CPT

## 2021-04-30 PROCEDURE — 86769 SARS-COV-2 COVID-19 ANTIBODY: CPT

## 2021-04-30 PROCEDURE — 93970 EXTREMITY STUDY: CPT

## 2021-04-30 PROCEDURE — 76000 FLUOROSCOPY <1 HR PHYS/QHP: CPT

## 2021-04-30 PROCEDURE — 85025 COMPLETE CBC W/AUTO DIFF WBC: CPT

## 2021-04-30 PROCEDURE — 85027 COMPLETE CBC AUTOMATED: CPT

## 2021-04-30 PROCEDURE — C1769: CPT

## 2021-04-30 PROCEDURE — U0005: CPT

## 2021-04-30 RX ORDER — ACETAMINOPHEN 500 MG
2 TABLET ORAL
Qty: 0 | Refills: 0 | DISCHARGE
Start: 2021-04-30

## 2021-04-30 RX ORDER — DOCUSATE SODIUM 100 MG
1 CAPSULE ORAL
Qty: 30 | Refills: 0
Start: 2021-04-30

## 2021-04-30 RX ORDER — QUINAPRIL HYDROCHLORIDE 40 MG/1
1 TABLET, FILM COATED ORAL
Qty: 0 | Refills: 0 | DISCHARGE

## 2021-04-30 RX ORDER — CYCLOBENZAPRINE HYDROCHLORIDE 10 MG/1
1 TABLET, FILM COATED ORAL
Qty: 30 | Refills: 0
Start: 2021-04-30

## 2021-04-30 RX ORDER — MULTIVIT WITH MIN/MFOLATE/K2 340-15/3 G
1 POWDER (GRAM) ORAL ONCE
Refills: 0 | Status: DISCONTINUED | OUTPATIENT
Start: 2021-04-30 | End: 2021-04-30

## 2021-04-30 RX ORDER — OXYCODONE HYDROCHLORIDE 5 MG/1
1 TABLET ORAL
Qty: 28 | Refills: 0
Start: 2021-04-30

## 2021-04-30 RX ADMIN — Medication 4: at 12:33

## 2021-04-30 RX ADMIN — PANTOPRAZOLE SODIUM 40 MILLIGRAM(S): 20 TABLET, DELAYED RELEASE ORAL at 05:29

## 2021-04-30 RX ADMIN — Medication 10 MILLIGRAM(S): at 05:29

## 2021-04-30 RX ADMIN — Medication 145 MILLIGRAM(S): at 12:32

## 2021-04-30 RX ADMIN — Medication 300 MICROGRAM(S): at 05:29

## 2021-04-30 NOTE — PROGRESS NOTE ADULT - ASSESSMENT
64 y/o female with pmhx of ITP currently on Promacta s/p BMB w/ benign results, denies significant bleeding events in the past, T2DM, HTN, obesity, s/p thoracic spine fracture 2/2 chronic steroid use for ITP s/p T4-T6 kyphoplasty in 6/20 now presents with worsening severe thoracic spine pain, 8/10, limiting her ADLs and quality of life. Presents for T3-T7 posterior stabilization and fusion. s/p   T3-T7 stabilization/ fusion 4/26/21 . Post op course uneventful except for post op anemia    Plan    Neuro stable. Maintain right HMV drain. Left HMV removed. Site C/D , Aquacel AG dressing replaced   HTN-. BP soft. Hold Lisinopril  Pain well controlled -on oxy prn  Type 2 DM- -160 On moderate sliding scale am & bedtime scale. Restart home dose Glipizide for discharge   ITP- Received Eltrombopag 4/26 . Platelet count stable . Next dose 5/1/21  Post op anemia- asymptomatic . Stable CBC   DVT ppx  PT- Out-patient D/c home in am once drains removed   
64 y/o female with pmhx of ITP currently on Promacta s/p BMB w/ benign results, denies significant bleeding events in the past, T2DM, HTN, obesity, s/p thoracic spine fracture 2/2 chronic steroid use for ITP s/p T4-T6 kyphoplasty in 6/20 now presents with worsening severe thoracic spine pain, 8/10, limiting her ADLs and quality of life. Presents for T3-T7 posterior stabilization and fusion. s/p   T3-T7 stabilization/ fusion 4/26/21 . Post op course uneventful except for post op anemia        Plan    Neuro stable.Right HMV drain removed Site C/D  Aquacel AG dressing replaced   HTN-. BP soft. Hold Lisinopril for now   Pain well controlled -on oxy prn  Type 2 DM- -160 On moderate sliding scale am & bedtime scale. Restart home dose Glipizide for discharge   ITP- Received Eltrombopag 4/26 . Platelet count stable . Next dose 5/1/21  Post op anemia- asymptomatic . Stable CBC   DVT ppx  PT- Out-patient D/c home today . D/w Dr Porter  
64 y/o female with pmhx of ITP currently on Promacta s/p BMB w/ benign results, denies significant bleeding events in the past, T2DM, HTN, obesity, s/p thoracic spine fracture 2/2 chronic steroid use for ITP s/p T4-T6 kyphoplasty in 6/20 now presents with worsening severe thoracic spine pain, 8/10, limiting her ADLs and quality of life. Presents for T3-T7 posterior stabilization and fusion. s/p   T3-T7 stabilization/ fusion 4/26/21 . Post op course uneventful except for post op anemia    Plan    Neuro stable. Maintain HMV drains  HTN-. BP soft. Hold Lisinopril SBP <130  Pain well controlled -on oxy prn  Type 2 DM- - 219. On moderate sliding scale am & bedtime scale. Restart home dose Glipizide for discharge   ITP- Received Eltrombopag. Platelet count stable   Post op anemia- asymptomatic Trend CBC in am  
66YO F s/p T3-7 post fusion robot-assisted, doing well postop. Exam: AOx3, BUE 5/5, BLE 4+/5 effort corona, no radiculopathy, SILT, no clonus.  PACU floor  HMVx2, ramires  q4h neuro checks, PT  Has chronic TCP, would trend platelets, fu home med needs for chronic TCP  pain control
HPI:  66 y/o female with pmhx of ITP currently on Promacta s/p BMB w/ benign results, denies significant bleeding events in the past, T2DM, HTN, obesity, s/p thoracic spine fracture 2/2 chronic steroid use for ITP s/p T4-T6 kyphoplasty in 6/202 now presents with worsening severe thoracic spine pain, 8/10, limiting her ADLs and quality of life. She denies taking pain medication and the only time she is not in pain is while lying on her left side. Presents for T3-T7 posterior stabilization and fusion.       *covid swab scheduled for 4/23 at [facility]  2nd Pfizer dose - 4/10/21 (05 Apr 2021 10:04)    PAST MEDICAL & SURGICAL HISTORY:  H/O: Hypertension (ICD9 V12.59)    Cholesterol Serum Elevated (ICD9 272.9)    Diabetes Mellitus Type 2 in Obese (ICD9 250.00)    H/O: Hypothyroidism (ICD9 V12.2)    Gall Stones (ICD9 574.20)    Morbid Obesity (ICD9 278.01)    GERD (gastroesophageal reflux disease)    Breast lump  right breast    Uterine polyp    Chronic ITP (idiopathic thrombocytopenia)  diagnosed 02/2019, lowest platelets 14, on Prednisone and Promacta, platelets levels fluctuate, Pt. sees hematologist weekly, doses adjusted weekly    Hepatomegaly  US and CT 02/2019 spleen normal    Thoracic spine fracture  2/2 chronic steriod use for ITP    H/O: Hysterectomy (ICD9 V88.01)  - supracervical, with BSO, Exploratory Laparotomy, 10/17/08    C Section (ICD9 669.70)    Abscess of Appendix (ICD9 540.1)  S/P appendectomy, 1990    S/P Carpal Tunnel Release (ICD9 V45.89)  left arm    Heel Spur (ICD9 726.73)  bilateral heel spurs  repaired    History of Breast Biopsy (ICD9 V45.89)  x3 left breast benign    History of cholecystectomy    H/O cystoscopy  ureteroscopy with Right ureteral stent, 11/24/2009    S/P laparoscopic cholecystectomy  02/17/2012    History of D&amp;C  with hysteroscopy, polypectomy, 08/01/08    S/P kyphoplasty  s/p T4-T6   6/2020    S/P cataract extraction    PROCEDURE: 4/26/21 S/P T3-7 posterior fusion with robot Mazor assistance.  POD# 1    PLAN:  Neuro: pain meds PRN, flexeril PRN spasms, maintain drains and monitor output  Respiratory: patient instructed on incentive spirometer  CV: cont lisinopril for HTN  Endocrine: diabetic diet and insulin sliding scale  Heme/Onc: chronic ITP, will monitor daily CBC until d/c, Home promacta 12.5mg every 5 days, has supply, next due on sat 5/1      DVT ppx: [] SQH [x] SQL and venodynes bilaterally  Renal: cont IVF as BP soft, TOV today  ID: afebrile  GI: bowel regimen  PT/OT: outpt PT and rolling walker, no skilled OT   f/u daily CBC and trend platelets given h/o ITP    Will discuss with Dr Rojas  [facility] # [id number]

## 2021-04-30 NOTE — PROGRESS NOTE ADULT - THIS PATIENT HAS THE FOLLOWING CONDITION(S)/DIAGNOSES ON THIS ADMISSION:
None
expected post op blood loss anemia, will monitor/Acute Blood Loss Anemia

## 2021-04-30 NOTE — DISCHARGE NOTE PROVIDER - NSDCCPCAREPLAN_GEN_ALL_CORE_FT
PRINCIPAL DISCHARGE DIAGNOSIS  Diagnosis: Thoracic spine fracture  Assessment and Plan of Treatment: s/p T3-T7 stabilization/ fusion 4/26/21   Keep Incision Clean and Dry. May shower 5/1/21. pat dry after. no creams or lotions on incision. No immersion baths..  May leave back dressing until 5/5/21. Remove after. leave open to air   Incision evaluation and staple removal at Dr Porter office in 1 week   No strenous activity. No heavy lifting. Do not return to work until cleared by physician. No driving until cleared by physician.  No bending back. No heavy lifting. No twisting back..  Do not take Aspirin  Motrin, aleve , Naproxen for pain.        SECONDARY DISCHARGE DIAGNOSES  Diagnosis: T2DM (type 2 diabetes mellitus)  Assessment and Plan of Treatment: May restart Glipizide    Diagnosis: Chronic ITP (idiopathic thrombocytopenia)  Assessment and Plan of Treatment: diagnosed 02/2019, lowest platelets 14, on Prednisone and Promacta, platelets levels fluctuate, Pt. sees hematologist weekly, doses adjusted weekly    Diagnosis: Hypertension  Assessment and Plan of Treatment: Hold Quinapril for now. May restart once SBP >130-140.   Follow up with primary care in 1-2 weeks

## 2021-04-30 NOTE — DISCHARGE NOTE PROVIDER - CARE PROVIDER_API CALL
Angelo Porter)  Neurosurgery  [address] [place] [address], [address]  [place] [address]  Phone: [phone]  Fax: [phone]  Follow Up Time:

## 2021-04-30 NOTE — PROGRESS NOTE ADULT - SUBJECTIVE AND OBJECTIVE BOX
SUBJECTIVE:     OVERNIGHT EVENTS: none    Vital Signs Last 24 Hrs  T(C): 36.9 (30 Apr 2021 09:12), Max: 36.9 (29 Apr 2021 13:39)  T(F): 98.5 (30 Apr 2021 09:12), Max: 98.5 (30 Apr 2021 09:12)  HR: 75 (30 Apr 2021 09:12) (67 - 79)  BP: 135/68 (30 Apr 2021 09:12) (100/56 - 139/79)  BP(mean): --  RR: 18 (30 Apr 2021 09:12) (17 - 18)  SpO2: 93% (30 Apr 2021 09:12) (93% - 96%)    PHYSICAL EXAM:      Neurological: Awake alert Ox3, Speech clear Following Commands, Moving all Extremities 5/5. Sensation intact. Upper back staples C/D/I.I R HMV 230cc/ 24 hrs serous than sanginous drainage     Pulmonary: Clear to Auscultation,    Cardiovascular: S1, S2, Regular rate and rhythm     Gastrointestinal: Soft, Non-tender, Non-distended     Extremities: No calf tenderness     LABS:                        9.4    8.48  )-----------( 157      ( 29 Apr 2021 07:02 )             30.1    04-29    137  |  105  |  25<H>  ----------------------------<  125<H>  4.5   |  21<L>  |  0.96    Ca    8.9      29 Apr 2021 07:01          IMAGING:         MEDICATIONS:    acetaminophen   Tablet .. 650 milliGRAM(s) Oral every 6 hours PRN Temp greater or equal to 38.5C (101.3F), Mild Pain (1 - 3)  cyclobenzaprine 5 milliGRAM(s) Oral three times a day PRN Muscle Spasm  ondansetron Injectable 4 milliGRAM(s) IV Push every 6 hours PRN Nausea and/or Vomiting  oxyCODONE    IR 10 milliGRAM(s) Oral every 4 hours PRN Severe Pain (7 - 10)  oxyCODONE    IR 5 milliGRAM(s) Oral every 4 hours PRN Moderate Pain (4 - 6)  bisacodyl Suppository 10 milliGRAM(s) Rectal daily PRN Constipation  magnesium citrate Oral Solution 1 Bottle Oral once  oxybutynin 10 milliGRAM(s) Oral two times a day  pantoprazole    Tablet 40 milliGRAM(s) Oral before breakfast  polyethylene glycol 3350 17 Gram(s) Oral daily  senna 2 Tablet(s) Oral at bedtime  atorvastatin 80 milliGRAM(s) Oral at bedtime  Eltrombopag 12.5 milliGRAM(s) 12.5 milliGRAM(s) Oral <User Schedule>  enoxaparin Injectable 40 milliGRAM(s) SubCutaneous <User Schedule>  fenofibrate Tablet 145 milliGRAM(s) Oral daily  glucagon  Injectable 1 milliGRAM(s) IntraMuscular once  insulin lispro (ADMELOG) corrective regimen sliding scale   SubCutaneous three times a day before meals  insulin lispro (ADMELOG) corrective regimen sliding scale   SubCutaneous at bedtime  levothyroxine 300 MICROGram(s) Oral daily  sodium chloride 0.9%. 1000 milliLiter(s) IV Continuous <Continuous>      DIET:

## 2021-04-30 NOTE — DISCHARGE NOTE PROVIDER - NSDCFUADDINST_GEN_ALL_CORE_FT
Return to ER if develops fevers, bleeding , wound drainage, uncontrolled pain, weakness of extremities, lethargy or sluggishness..

## 2021-04-30 NOTE — DISCHARGE NOTE NURSING/CASE MANAGEMENT/SOCIAL WORK - NSDCPEEMAIL_GEN_ALL_CORE
[facility] for Tobacco Control email [email or web address]

## 2021-04-30 NOTE — DISCHARGE NOTE PROVIDER - NSDCMRMEDTOKEN_GEN_ALL_CORE_FT
Crestor 20 mg oral tablet: 1 tab(s) orally once a day (at bedtime)  ezetimibe 10 mg oral tablet: 1 tab(s) orally once a day  fenofibrate 134 mg oral capsule: 1 cap(s) orally once a day  glipiZIDE 5 mg oral tablet: 1 tab(s) orally once a day  omeprazole 40 mg oral delayed release capsule: 1 cap(s) orally once a day  Promacta 12.5 mg oral tablet: 1 tab(s) orally every 5 days  quinapril 10 mg oral tablet: 1 tab(s) orally once a day  Synthroid 300 mcg (0.3 mg) oral tablet: 1 tab(s) orally once a day  VESIcare 10 mg oral tablet: 1 tab(s) orally once a day   acetaminophen 500 mg oral tablet: 2 tab(s) orally every 6 hours, As Needed - for moderate pain  bisacodyl 5 mg oral delayed release tablet: 1 tab(s) orally once a day, As Needed - for constipation  Colace 100 mg oral capsule: 1 cap(s) orally 2 times a day   Crestor 20 mg oral tablet: 1 tab(s) orally once a day (at bedtime)  cyclobenzaprine 5 mg oral tablet: 1 tab(s) orally 3 times a day, As needed, Muscle Spasm  ezetimibe 10 mg oral tablet: 1 tab(s) orally once a day  fenofibrate 134 mg oral capsule: 1 cap(s) orally once a day  glipiZIDE 5 mg oral tablet: 1 tab(s) orally once a day  omeprazole 40 mg oral delayed release capsule: 1 cap(s) orally once a day  oxyCODONE 10 mg oral tablet: 1 tab(s) orally every 4 to 6 hours, As Needed -Severe Pain (7 - 10) MDD:4  Promacta 12.5 mg oral tablet: 1 tab(s) orally every 5 days  Synthroid 300 mcg (0.3 mg) oral tablet: 1 tab(s) orally once a day  VESIcare 10 mg oral tablet: 1 tab(s) orally once a day

## 2021-04-30 NOTE — DISCHARGE NOTE NURSING/CASE MANAGEMENT/SOCIAL WORK - NSDCPEWEB_GEN_ALL_CORE
[facility] for Tobacco Control website --- [email or web address] website --- [email or web address]

## 2021-04-30 NOTE — DISCHARGE NOTE PROVIDER - HOSPITAL COURSE
66 y/o female with pmhx of ITP currently on Promacta s/p BMB w/ benign results, denies significant bleeding events in the past, T2DM, HTN, obesity, s/p thoracic spine fracture 2/2 chronic steroid use for ITP s/p T4-T6 kyphoplasty in 6/20 now presents with worsening severe thoracic spine pain, 8/10, limiting her ADLs and quality of life. Presents for T3-T7 posterior stabilization and fusion.   s/p T3-T7 stabilization/ fusion 4/26/21 . Post op course uneventful except for post op anemia. H/o  ITP- Received Eltrombopag 4/26/21  . Platelet count stable . Next dose 5/1/21. Surgical drains removed 4/29/ & 4/30/21.  Evaluated by PT and recommended for outpatient services. Discharged in stable condition

## 2021-04-30 NOTE — DISCHARGE NOTE NURSING/CASE MANAGEMENT/SOCIAL WORK - PATIENT PORTAL LINK FT
You can access the FollowMyHealth Patient Portal offered by [facility] by registering at the following website: [email or web address]. By joining [facility]’s FollowMyHealth portal, you will also be able to view your health information using other applications (apps) compatible with our system.

## 2021-05-01 VITALS
TEMPERATURE: 99 F | DIASTOLIC BLOOD PRESSURE: 78 MMHG | HEART RATE: 83 BPM | OXYGEN SATURATION: 93 % | RESPIRATION RATE: 18 BRPM | SYSTOLIC BLOOD PRESSURE: 122 MMHG

## 2021-07-30 ENCOUNTER — OUTPATIENT (OUTPATIENT)
Dept: OUTPATIENT SERVICES | Facility: HOSPITAL | Age: 66
LOS: 1 days | End: 2021-07-30
Payer: MEDICARE

## 2021-07-30 ENCOUNTER — APPOINTMENT (OUTPATIENT)
Dept: RADIOLOGY | Facility: IMAGING CENTER | Age: 66
End: 2021-07-30
Payer: MEDICARE

## 2021-07-30 DIAGNOSIS — Z98.89 OTHER SPECIFIED POSTPROCEDURAL STATES: Chronic | ICD-10-CM

## 2021-07-30 DIAGNOSIS — Z98.49 CATARACT EXTRACTION STATUS, UNSPECIFIED EYE: Chronic | ICD-10-CM

## 2021-07-30 DIAGNOSIS — Z98.890 OTHER SPECIFIED POSTPROCEDURAL STATES: Chronic | ICD-10-CM

## 2021-07-30 DIAGNOSIS — M25.519 PAIN IN UNSPECIFIED SHOULDER: ICD-10-CM

## 2021-07-30 DIAGNOSIS — Z90.49 ACQUIRED ABSENCE OF OTHER SPECIFIED PARTS OF DIGESTIVE TRACT: Chronic | ICD-10-CM

## 2021-07-30 PROCEDURE — 73030 X-RAY EXAM OF SHOULDER: CPT

## 2021-07-30 PROCEDURE — 73030 X-RAY EXAM OF SHOULDER: CPT | Mod: 26,LT

## 2021-08-13 ENCOUNTER — OUTPATIENT (OUTPATIENT)
Dept: OUTPATIENT SERVICES | Facility: HOSPITAL | Age: 66
LOS: 1 days | End: 2021-08-13
Payer: MEDICARE

## 2021-08-13 ENCOUNTER — APPOINTMENT (OUTPATIENT)
Dept: MRI IMAGING | Facility: CLINIC | Age: 66
End: 2021-08-13
Payer: MEDICARE

## 2021-08-13 DIAGNOSIS — Z98.890 OTHER SPECIFIED POSTPROCEDURAL STATES: Chronic | ICD-10-CM

## 2021-08-13 DIAGNOSIS — Z00.8 ENCOUNTER FOR OTHER GENERAL EXAMINATION: ICD-10-CM

## 2021-08-13 DIAGNOSIS — Z90.49 ACQUIRED ABSENCE OF OTHER SPECIFIED PARTS OF DIGESTIVE TRACT: Chronic | ICD-10-CM

## 2021-08-13 DIAGNOSIS — Z98.89 OTHER SPECIFIED POSTPROCEDURAL STATES: Chronic | ICD-10-CM

## 2021-08-13 DIAGNOSIS — Z98.49 CATARACT EXTRACTION STATUS, UNSPECIFIED EYE: Chronic | ICD-10-CM

## 2021-08-13 PROCEDURE — 73221 MRI JOINT UPR EXTREM W/O DYE: CPT | Mod: MH

## 2021-08-13 PROCEDURE — 73221 MRI JOINT UPR EXTREM W/O DYE: CPT | Mod: 26,LT,MH

## 2021-10-18 NOTE — CONSULT NOTE ADULT - SUBJECTIVE AND OBJECTIVE BOX
Stroke Neurology:   The stroke service is recommending a cardiac event monitor upon discharge.  We have placed the order and your nurse can help coordinate pick-up.  Nursing- when discharge date/time is known, please call [phone] ([address], medical office building 3) and ask to schedule the patient's appointment for a cardiac event monitor.  It can often be scheduled for a time immediately following discharge so that the patient can have it applied prior to going home.  If no same-day appointments are available, please arrange one the works for the patient within one week.  Please page the stroke service with questions/concerns, [phone] (Mon-Fri, 6-4 pm).  Thank you!    
[facility] Division of Hospital Medicine  Tatiana Aquino MD  Pager (ANDREW-NANCY, 4F-4K): [phone]  Other Times:  [phone]    PMD:     Patient is a 64y old  Female who presents with a chief complaint of Back pain. (11 Jun 2020 08:54)      HPI:  Patient is a 63 y/o female with h/o HTN, obesity, GERD, HLD, DM2, ITP diagnosed in 02/2019 on Prednisone and Promacta with platelets greatly fluctuating all the time, lowest being 14 and highest in 400s  (sees hematologist weekly with adjustment of meds), who was diagnosed with T4, T5, T6 compression fractures, probably due to use of steroids. She is scheduled for T4, T5, T6 Kyphoplasty. (11 Jun 2020 08:54)    Interval History: s/p T4-6 kyphoplasty on 6/18 with course c/b hyperglycemia on stress dose steroids. Denies fevers, chills, chest pain, SOB, abd pain, n/v/d/c, nor dysuria. post-op pain well controlled.    Allergies    Cipro (Hives)  coconuts (Hives)  fluoroquinolone antibiotics (Hives)  raspberries &amp; blueberries (Hives)  splenda (Hives)  Vitamin D (Hives)    Intolerances        HOME MEDICATIONS: [x] Reviewed   Home Medications:  ezetimibe 10 mg oral tablet: 1 tab(s) orally once a day (18 Jun 2020 06:21)  fenofibrate 134 mg oral capsule: 1 cap(s) orally once a day (18 Jun 2020 06:21)  omeprazole 40 mg oral delayed release capsule: 1 cap(s) orally once a day (18 Jun 2020 06:21)  predniSONE 5 mg oral tablet: 1 tab(s) orally once a day (18 Jun 2020 06:21)  Promacta 75 mg oral tablet: 1 tab(s) orally once a day (18 Jun 2020 06:21)  quinapril 10 mg oral tablet: 1 tab(s) orally once a day (18 Jun 2020 06:21)  Tirosint 100 mcg (0.1 mg) oral capsule: 5 cap(s) orally once a day (patient states she takes 5 capsules of the 100mcg qd. Rx from 11/18 was for 90 days of 4 capsules qd) (18 Jun 2020 06:21)      MEDICATIONS  (STANDING):  ceFAZolin   IVPB 2000 milliGRAM(s) IV Intermittent once  dextrose 5%. 1000 milliLiter(s) (50 mL/Hr) IV Continuous <Continuous>  dextrose 50% Injectable 12.5 Gram(s) IV Push once  eltrombopag 75 milliGRAM(s) Oral daily  fenofibrate Tablet 145 milliGRAM(s) Oral daily  hydrocortisone sodium succinate Injectable 100 milliGRAM(s) IV Push every 8 hours  insulin glargine Injectable (LANTUS) 20 Unit(s) SubCutaneous at bedtime  insulin lispro (HumaLOG) corrective regimen sliding scale   SubCutaneous at bedtime  insulin lispro (HumaLOG) corrective regimen sliding scale   SubCutaneous three times a day before meals  insulin lispro Injectable (HumaLOG) 5 Unit(s) SubCutaneous three times a day with meals  levothyroxine 300 MICROGram(s) Oral daily  lisinopril 10 milliGRAM(s) Oral daily  pantoprazole    Tablet 40 milliGRAM(s) Oral before breakfast  polyethylene glycol 3350 17 Gram(s) Oral daily  senna 2 Tablet(s) Oral at bedtime  sodium chloride 0.9%. 1000 milliLiter(s) (75 mL/Hr) IV Continuous <Continuous>    MEDICATIONS  (PRN):  acetaminophen   Tablet .. 325 milliGRAM(s) Oral every 4 hours PRN Temp greater or equal to 38C (100.4F), Mild Pain (1 - 3)  dextrose 40% Gel 15 Gram(s) Oral once PRN Blood Glucose LESS THAN 70 milliGRAM(s)/deciliter  glucagon  Injectable 1 milliGRAM(s) IntraMuscular once PRN Glucose LESS THAN 70 milligrams/deciliter  oxyCODONE    IR 5 milliGRAM(s) Oral every 6 hours PRN Moderate Pain (4 - 6)  oxyCODONE    IR 10 milliGRAM(s) Oral every 6 hours PRN Severe Pain (7 - 10)      PAST MEDICAL & SURGICAL HISTORY:  Hepatomegaly: US and CT 02/2019 with possible cirrhosis  Chronic ITP (idiopathic thrombocytopenia): diagnosed 02/2019, lowest platelets 14, on Prednisone and Promacta, platelets levels fluctuate, Pt. sees hematologist weekly, doses adjusted weekly  Uterine polyp  Breast lump: right breast  GERD (gastroesophageal reflux disease)  Morbid Obesity (ICD9 278.01)  Gall Stones (ICD9 574.20)  H/O: Hypothyroidism (ICD9 V12.2)  Diabetes Mellitus Type 2 in Obese (ICD9 250.00)  Cholesterol Serum Elevated (ICD9 272.9)  H/O: Hypertension (ICD9 V12.59)  History of D&C: with hysteroscopy, polypectomy, 08/01/08  S/P laparoscopic cholecystectomy: 02/17/2012  H/O cystoscopy: ureteroscopy with Right ureteral stent, 11/24/2009  History of cholecystectomy  History of Breast Biopsy (ICD9 V45.89): x3 left breast benign  Heel Spur (ICD9 726.73): bilateral heel spurs  repaired  S/P Carpal Tunnel Release (ICD9 V45.89): left arm  Abscess of Appendix (ICD9 540.1): S/P appendectomy, 1990  C Section (ICD9 669.70)  H/O: Hysterectomy (ICD9 V88.01): - supracervical, with BSO, Exploratory Laparotomy, 10/17/08  [x] Reviewed     SOCIAL HISTORY:  Lives alone at home, ambulated with no assisted devices prior to admission  active smoker, smoke 1/2 PPD x 40 years  denies EtOH use nor drug use  retired     FAMILY HISTORY:  Family history of breast cancer (Mother)    REVIEW OF SYSTEMS:    CONSTITUTIONAL: No fever, weight loss, or fatigue  EYES: No eye pain, visual disturbances, or discharge  ENMT:  No difficulty hearing, tinnitus, vertigo; No sinus or throat pain  NECK: No pain or stiffness  RESPIRATORY: No cough, wheezing, chills or hemoptysis; No shortness of breath  CARDIOVASCULAR: No chest pain, palpitations, dizziness, or leg swelling  GASTROINTESTINAL: No abdominal or epigastric pain. No nausea, vomiting, or hematemesis; No diarrhea or constipation. No melena or hematochezia.  GENITOURINARY: No dysuria, frequency, hematuria, or incontinence  NEUROLOGICAL: No headaches, memory loss, loss of strength, numbness, or tremors  SKIN: No itching, burning, rashes, or lesions   LYMPH NODES: No enlarged glands  ENDOCRINE: No heat or cold intolerance; No hair loss  MUSCULOSKELETAL: +mild pain at surgical site  PSYCHIATRIC: No depression, anxiety, mood swings, or difficulty sleeping  HEME/LYMPH: +easy bruising, no gum bleeding  ALLERGY AND IMMUNOLOGIC: No hives or eczema      PHYSICAL EXAM:  Vital Signs Last 24 Hrs  T(C): 36.8 (19 Jun 2020 12:23), Max: 36.8 (19 Jun 2020 12:23)  T(F): 98.3 (19 Jun 2020 12:23), Max: 98.3 (19 Jun 2020 12:23)  HR: 68 (19 Jun 2020 12:23) (61 - 78)  BP: 105/65 (19 Jun 2020 12:23) (96/58 - 132/61)  BP(mean): 88 (18 Jun 2020 20:00) (70 - 88)  RR: 18 (19 Jun 2020 12:23) (16 - 18)  SpO2: 94% (19 Jun 2020 12:23) (91% - 100%)    CONSTITUTIONAL: NAD, well-developed, well-groomed  EYES: PERRLA; conjunctiva and sclera clear  ENMT: Moist oral mucosa, no pharyngeal injection or exudates; normal dentition  NECK: Supple, no palpable masses; no thyromegaly  RESPIRATORY: Normal respiratory effort; lungs are clear to auscultation bilaterally, no wheeze nor crackles  CARDIOVASCULAR: Regular rate and rhythm, normal S1 and S2, no murmur/rub/gallop; No lower extremity edema; Peripheral pulses are 2+ bilaterally  ABDOMEN: Soft, Nondistended,  Nontender to palpation, normoactive bowel sounds  MUSCULOSKELETAL:  No clubbing or cyanosis of digits; no joint swelling or tenderness to palpation  PSYCH: A+O to person, place, and time; affect appropriate  NEUROLOGY: CN 2-12 are intact and symmetric; no gross sensory deficits , 5/5 strength in all extremities  SKIN: +thoracic surgical site c/d/i with 2 hemovacs with serosanguinous drainage    LABS:                        8.1    12.02 )-----------( 77       ( 19 Jun 2020 07:02 )             27.5     06-19    138  |  106  |  13  ----------------------------<  202<H>  4.3   |  21<L>  |  1.08    Ca    7.7<L>      19 Jun 2020 07:02    TPro  x   /  Alb  3.4  /  TBili  x   /  DBili  x   /  AST  x   /  ALT  x   /  AlkPhos  x   06-19          RADIOLOGY & ADDITIONAL TESTS:  Results Reviewed: leukocytosis downtrending, Hb stable, Cr downtrending, calcium corrected for albumin wnl  Imaging Personally Reviewed:   Electrocardiogram Personally Reviewed:    COORDINATION OF CARE:  Care Discussed with Consultants/Other Providers [Y]: Neurosurgery SAVITA Cummins  Prior or Outpatient Records Reviewed [Y/N]:

## 2021-11-22 NOTE — H&P PST ADULT - NSICDXPASTSURGICALHX_GEN_ALL_CORE_FT
PAST SURGICAL HISTORY:  Abscess of Appendix (ICD9 540.1) S/P appendectomy, 1990    C Section (ICD9 669.70)     H/O cystoscopy ureteroscopy with Right ureteral stent, 11/24/2009    H/O: Hysterectomy (ICD9 V88.01) - supracervical, with BSO, Exploratory Laparotomy, 10/17/08    Heel Spur (ICD9 726.73) bilateral heel spurs  repaired    History of Breast Biopsy (ICD9 V45.89) x3 left breast benign    History of cholecystectomy     History of D&C with hysteroscopy, polypectomy, 08/01/08    S/P Carpal Tunnel Release (ICD9 V45.89) left arm    S/P laparoscopic cholecystectomy 02/17/2012     normal... PAST SURGICAL HISTORY:  Abscess of Appendix (ICD9 540.1) S/P appendectomy, 1990    C Section (ICD9 669.70)     H/O cystoscopy ureteroscopy with Right ureteral stent, 11/24/2009    H/O: Hysterectomy (ICD9 V88.01) - supracervical, with BSO, Exploratory Laparotomy, 10/17/08    Heel Spur (ICD9 726.73) bilateral heel spurs  repaired    History of Breast Biopsy (ICD9 V45.89) x3 left breast benign    History of cholecystectomy     History of D&C with hysteroscopy, polypectomy, 08/01/08    S/P Carpal Tunnel Release (ICD9 V45.89) left arm    S/P kyphoplasty s/p T4-T6   6/2020    S/P laparoscopic cholecystectomy 02/17/2012     PAST SURGICAL HISTORY:  Abscess of Appendix (ICD9 540.1) S/P appendectomy, 1990    C Section (ICD9 669.70)     H/O cystoscopy ureteroscopy with Right ureteral stent, 11/24/2009    H/O: Hysterectomy (ICD9 V88.01) - supracervical, with BSO, Exploratory Laparotomy, 10/17/08    Heel Spur (ICD9 726.73) bilateral heel spurs  repaired    History of Breast Biopsy (ICD9 V45.89) x3 left breast benign    History of cholecystectomy     History of D&C with hysteroscopy, polypectomy, 08/01/08    S/P Carpal Tunnel Release (ICD9 V45.89) left arm    S/P cataract extraction     S/P kyphoplasty s/p T4-T6   6/2020    S/P laparoscopic cholecystectomy 02/17/2012

## 2021-12-06 ENCOUNTER — OUTPATIENT (OUTPATIENT)
Dept: OUTPATIENT SERVICES | Facility: HOSPITAL | Age: 66
LOS: 1 days | End: 2021-12-06
Payer: MEDICARE

## 2021-12-06 ENCOUNTER — APPOINTMENT (OUTPATIENT)
Dept: CT IMAGING | Facility: IMAGING CENTER | Age: 66
End: 2021-12-06

## 2021-12-06 DIAGNOSIS — Z90.49 ACQUIRED ABSENCE OF OTHER SPECIFIED PARTS OF DIGESTIVE TRACT: Chronic | ICD-10-CM

## 2021-12-06 DIAGNOSIS — Z98.89 OTHER SPECIFIED POSTPROCEDURAL STATES: Chronic | ICD-10-CM

## 2021-12-06 DIAGNOSIS — Z98.49 CATARACT EXTRACTION STATUS, UNSPECIFIED EYE: Chronic | ICD-10-CM

## 2021-12-06 DIAGNOSIS — Z98.890 OTHER SPECIFIED POSTPROCEDURAL STATES: ICD-10-CM

## 2021-12-06 DIAGNOSIS — Z98.890 OTHER SPECIFIED POSTPROCEDURAL STATES: Chronic | ICD-10-CM

## 2021-12-06 PROCEDURE — 76377 3D RENDER W/INTRP POSTPROCES: CPT

## 2021-12-06 PROCEDURE — 72128 CT CHEST SPINE W/O DYE: CPT | Mod: MH

## 2022-09-26 NOTE — H&P PST ADULT - PAIN, FACTORS THAT RELIEVE, PROFILE
Quality 226: Preventive Care And Screening: Tobacco Use: Screening And Cessation Intervention: Patient screened for tobacco use and is an ex/non-smoker Quality 130: Documentation Of Current Medications In The Medical Record: Current Medications Documented Detail Level: Detailed Quality 431: Preventive Care And Screening: Unhealthy Alcohol Use - Screening: Patient not identified as an unhealthy alcohol user when screened for unhealthy alcohol use using a systematic screening method repositioning

## 2022-11-05 NOTE — PRE-OP CHECKLIST - LOOSE TEETH
Discussed and encouraged supportive treatment  Started on Prednisone and Albuterol  Encouraged follow up with PCP   no

## 2023-03-30 NOTE — H&P PST ADULT - NS PRO LACT YNNA
BARIATRIC POST-OPERATIVE VISIT:    HPI:  Christine Alfaro is a 49 y.o. year old female presents for 8 week post op visit following s/p sleeve.  she is doing well and tolerating the diet without difficulty.          Denies: nausea, vomiting, abdominal pain, changes in bowel movement pattern, fever, chills, dysphagia, chest pain, and shortness of breath.    Review of Systems   Constitutional:  Negative for fatigue and fever.   HENT:  Negative for tinnitus and trouble swallowing.    Eyes:  Negative for visual disturbance.   Respiratory:  Negative for cough and shortness of breath.    Cardiovascular:  Negative for chest pain, palpitations and leg swelling.   Gastrointestinal:  Negative for abdominal pain, constipation, diarrhea, nausea and vomiting.   Genitourinary:  Negative for decreased urine volume and dysuria.        Urine color change ( bright yellow)    Skin:  Negative for rash.   Neurological:  Negative for dizziness, light-headedness and headaches.   Psychiatric/Behavioral:  Negative for dysphoric mood, sleep disturbance and suicidal ideas. The patient is not nervous/anxious.      EXERCISE & VITAMINS:  See Bariatric Assessment  Adherent to vitamin regimen   MEDICATIONS/ALLERGIES:  Have been reviewed.    DIET: Bariatric Diet. ~90 grams protein.  36-42 oz fl oz SF clear beverage.  See Dietician note from today for a more detailed assessment.      Am: protein waffle   Protein shake and protein water   Eats another waffle as snacl   Lunch grilled chicken grilled shrimp   Dinner shake   Physical Exam  Vitals reviewed.   Constitutional:       Appearance: She is obese.   HENT:      Head: Normocephalic and atraumatic.   Eyes:      Extraocular Movements: Extraocular movements intact.      Conjunctiva/sclera: Conjunctivae normal.      Pupils: Pupils are equal, round, and reactive to light.   Cardiovascular:      Rate and Rhythm: Normal rate and regular rhythm.      Pulses: Normal pulses.      Heart sounds: Normal  heart sounds.   Pulmonary:      Effort: Pulmonary effort is normal. No respiratory distress.      Breath sounds: Normal breath sounds.   Abdominal:      General: Bowel sounds are normal.      Palpations: Abdomen is soft.      Tenderness: There is no abdominal tenderness.   Musculoskeletal:         General: No swelling or tenderness. Normal range of motion.      Cervical back: Normal range of motion and neck supple.   Skin:     General: Skin is dry.      Capillary Refill: Capillary refill takes less than 2 seconds.      Coloration: Skin is not jaundiced.   Neurological:      General: No focal deficit present.      Mental Status: She is alert and oriented to person, place, and time.   Psychiatric:         Mood and Affect: Mood normal.         Behavior: Behavior normal.         Thought Content: Thought content normal.         Judgment: Judgment normal.       ASSESSMENT:  - Morbid obesity s/p sleeve gastrectomy   - Co-morbidities: dyslipidemia, hypertension, and obstructive sleep apnea  - Good Weight loss, 40#'s   - No formal Exercise routine  - Good Diet  - Good Vitamin regimen    PLAN:  - Anti-Acid medication, PPI daily for 3 months             - slow taper discussed   - No lifting more than 10 lbs for 6 weeks  - Miralax daily for constipation  - Emphasized the importance of regular exercise and adherence to bariatric diet to achieve maximum weight loss.  - Encouraged patient to start/continue regular exercise.  - Follow-up with dietician to advance diet.  - Continue daily vitamins and medications.  - RTC per post op schedule  - Call the office for any issues.  - Check labs today.       no

## 2023-06-12 NOTE — DISCHARGE NOTE ADULT - NURSING SECTION COMPLETE
Hide Additional Notes?: No Detail Level: Simple Detail Level: Zone Patient/Caregiver provided printed discharge information.

## 2023-08-18 NOTE — H&P PST ADULT - NSANTHOSAYNRD_GEN_A_CORE
No. JASON screening performed.  STOP BANG Legend: 0-2 = LOW Risk; 3-4 = INTERMEDIATE Risk; 5-8 = HIGH Risk
no

## 2023-09-24 NOTE — H&P PST ADULT - VENOUS THROMBOEMBOLISM BMI
Med rec clarified   -Cont. IV Keppra 1000 BID  -Cont. IV Vimpat IVPB 200 BID  -Cont. Valproic acid 250 mg PO via PEG TID  -F/u neurology recommendations (1) obesity (BMI greater than 25)

## 2024-07-08 NOTE — H&P PST ADULT - PRO ARRIVE FROM
home I attest my time as attending is greater than 50% of the total combined time spent on qualifying patient care activities by the PA/NP and attending.

## 2024-09-04 NOTE — PROGRESS NOTE ADULT - PROVIDER SPECIALTY LIST ADULT
Neurosurgery
Please call pt.'s sister Hetal back [phone]  
Neurosurgery

## 2024-11-21 NOTE — PHYSICAL THERAPY INITIAL EVALUATION ADULT - PERTINENT HX OF CURRENT PROBLEM, REHAB EVAL
Pt AAO, ambulatory. Discharge instructions given to pt, verbalized understanding.  Pt seen by MD at bedside.  Discharged to family.     65 y.o. F PMH ITP currently on Promacta s/p BMB w/ benign results, denies significant bleeding events in the past, T2DM, HTN, obesity, s/p thoracic spine fracture 2/2 chronic steroid use for ITP s/p T4-T6 kyphoplasty in 6/2020 now presents with worsening severe thoracic spine pain, 8/10, limiting her ADLs & quality of life. Now s/p T3-T7 posterior fusion on 4/26/21.

## 2025-03-12 NOTE — PROGRESS NOTE ADULT - TIME-BASED
No protocol for requested medication.    Medication: Cosopt 2-0.5% Ophthalmic Solution  Last office visit date: 3/5/25  Pharmacy: [facility] DEL[facility] - [address]    Order pended, routed to clinician for review.    
15
15

## 2025-04-10 NOTE — PHYSICAL THERAPY INITIAL EVALUATION ADULT - THERAPY FREQUENCY, PT EVAL
Patient resting at this time. Respirations are unlabored and even. No signs of distress noted. Safety precautions in place. Sitter at bedside.     Myrtle Sun RN  04/10/25 0000     3-5x/week